# Patient Record
Sex: MALE | Race: WHITE | NOT HISPANIC OR LATINO | Employment: OTHER | URBAN - METROPOLITAN AREA
[De-identification: names, ages, dates, MRNs, and addresses within clinical notes are randomized per-mention and may not be internally consistent; named-entity substitution may affect disease eponyms.]

---

## 2017-01-11 ENCOUNTER — COMMUNICATION - HEALTHEAST (OUTPATIENT)
Dept: FAMILY MEDICINE | Facility: CLINIC | Age: 77
End: 2017-01-11

## 2017-01-11 ENCOUNTER — OFFICE VISIT - HEALTHEAST (OUTPATIENT)
Dept: FAMILY MEDICINE | Facility: CLINIC | Age: 77
End: 2017-01-11

## 2017-01-11 DIAGNOSIS — I10 BENIGN ESSENTIAL HYPERTENSION: ICD-10-CM

## 2017-01-11 DIAGNOSIS — N40.0 PROSTATISM: ICD-10-CM

## 2017-01-11 DIAGNOSIS — Z00.00 HEALTH CARE MAINTENANCE: ICD-10-CM

## 2017-01-11 DIAGNOSIS — N18.30 CKD (CHRONIC KIDNEY DISEASE) STAGE 3, GFR 30-59 ML/MIN (H): ICD-10-CM

## 2017-01-11 DIAGNOSIS — E03.9 HYPOTHYROIDISM, UNSPECIFIED TYPE: ICD-10-CM

## 2017-01-11 DIAGNOSIS — E78.5 HYPERLIPIDEMIA: ICD-10-CM

## 2017-01-11 DIAGNOSIS — Z00.00 MEDICARE ANNUAL WELLNESS VISIT, INITIAL: ICD-10-CM

## 2017-01-11 LAB
CHOLEST SERPL-MCNC: 190 MG/DL
FASTING STATUS PATIENT QL REPORTED: YES
HDLC SERPL-MCNC: 67 MG/DL
LDLC SERPL CALC-MCNC: 103 MG/DL
PSA SERPL-MCNC: 2.8 NG/ML (ref 0–6.5)
TRIGL SERPL-MCNC: 98 MG/DL

## 2017-01-11 ASSESSMENT — MIFFLIN-ST. JEOR: SCORE: 1485.57

## 2017-01-12 LAB
ATRIAL RATE - MUSE: 54 BPM
DIASTOLIC BLOOD PRESSURE - MUSE: NORMAL MMHG
INTERPRETATION ECG - MUSE: NORMAL
P AXIS - MUSE: 33 DEGREES
PR INTERVAL - MUSE: 160 MS
QRS DURATION - MUSE: 106 MS
QT - MUSE: 470 MS
QTC - MUSE: 445 MS
R AXIS - MUSE: -18 DEGREES
SYSTOLIC BLOOD PRESSURE - MUSE: NORMAL MMHG
T AXIS - MUSE: -2 DEGREES
VENTRICULAR RATE- MUSE: 54 BPM

## 2017-01-18 ENCOUNTER — COMMUNICATION - HEALTHEAST (OUTPATIENT)
Dept: FAMILY MEDICINE | Facility: CLINIC | Age: 77
End: 2017-01-18

## 2017-01-19 ENCOUNTER — COMMUNICATION - HEALTHEAST (OUTPATIENT)
Dept: FAMILY MEDICINE | Facility: CLINIC | Age: 77
End: 2017-01-19

## 2017-01-19 DIAGNOSIS — E87.6 HYPOKALEMIA: ICD-10-CM

## 2017-01-19 DIAGNOSIS — N40.0 BPH (BENIGN PROSTATIC HYPERPLASIA): ICD-10-CM

## 2017-01-19 DIAGNOSIS — I10 BENIGN ESSENTIAL HYPERTENSION: ICD-10-CM

## 2017-01-19 DIAGNOSIS — I10 ESSENTIAL HYPERTENSION: ICD-10-CM

## 2017-01-19 DIAGNOSIS — E03.9 HYPOTHYROIDISM: ICD-10-CM

## 2017-03-27 ENCOUNTER — AMBULATORY - HEALTHEAST (OUTPATIENT)
Dept: FAMILY MEDICINE | Facility: CLINIC | Age: 77
End: 2017-03-27

## 2017-03-27 ENCOUNTER — COMMUNICATION - HEALTHEAST (OUTPATIENT)
Dept: FAMILY MEDICINE | Facility: CLINIC | Age: 77
End: 2017-03-27

## 2017-03-27 DIAGNOSIS — I10 BENIGN ESSENTIAL HYPERTENSION: ICD-10-CM

## 2017-04-04 ENCOUNTER — COMMUNICATION - HEALTHEAST (OUTPATIENT)
Dept: FAMILY MEDICINE | Facility: CLINIC | Age: 77
End: 2017-04-04

## 2017-04-04 DIAGNOSIS — R25.2 LEG CRAMPS: ICD-10-CM

## 2017-05-03 ENCOUNTER — RECORDS - HEALTHEAST (OUTPATIENT)
Dept: ADMINISTRATIVE | Facility: OTHER | Age: 77
End: 2017-05-03

## 2017-05-30 ENCOUNTER — COMMUNICATION - HEALTHEAST (OUTPATIENT)
Dept: FAMILY MEDICINE | Facility: CLINIC | Age: 77
End: 2017-05-30

## 2017-05-30 DIAGNOSIS — I10 ESSENTIAL HYPERTENSION: ICD-10-CM

## 2017-07-31 ENCOUNTER — COMMUNICATION - HEALTHEAST (OUTPATIENT)
Dept: FAMILY MEDICINE | Facility: CLINIC | Age: 77
End: 2017-07-31

## 2017-09-07 ENCOUNTER — COMMUNICATION - HEALTHEAST (OUTPATIENT)
Dept: SCHEDULING | Facility: CLINIC | Age: 77
End: 2017-09-07

## 2017-09-07 DIAGNOSIS — R40.0 DAYTIME SLEEPINESS: ICD-10-CM

## 2017-09-07 DIAGNOSIS — R25.2 LEG CRAMPS: ICD-10-CM

## 2017-09-11 ENCOUNTER — COMMUNICATION - HEALTHEAST (OUTPATIENT)
Dept: FAMILY MEDICINE | Facility: CLINIC | Age: 77
End: 2017-09-11

## 2017-09-30 ENCOUNTER — COMMUNICATION - HEALTHEAST (OUTPATIENT)
Dept: FAMILY MEDICINE | Facility: CLINIC | Age: 77
End: 2017-09-30

## 2017-09-30 DIAGNOSIS — I10 BENIGN ESSENTIAL HYPERTENSION: ICD-10-CM

## 2017-11-11 ENCOUNTER — COMMUNICATION - HEALTHEAST (OUTPATIENT)
Dept: FAMILY MEDICINE | Facility: CLINIC | Age: 77
End: 2017-11-11

## 2017-11-11 DIAGNOSIS — I10 BENIGN ESSENTIAL HYPERTENSION: ICD-10-CM

## 2017-12-06 ENCOUNTER — AMBULATORY - HEALTHEAST (OUTPATIENT)
Dept: FAMILY MEDICINE | Facility: CLINIC | Age: 77
End: 2017-12-06

## 2018-01-10 ENCOUNTER — OFFICE VISIT - HEALTHEAST (OUTPATIENT)
Dept: FAMILY MEDICINE | Facility: CLINIC | Age: 78
End: 2018-01-10

## 2018-01-10 DIAGNOSIS — N40.0 BPH (BENIGN PROSTATIC HYPERPLASIA): ICD-10-CM

## 2018-01-10 DIAGNOSIS — N40.0 PROSTATISM: ICD-10-CM

## 2018-01-10 DIAGNOSIS — N52.9 ED (ERECTILE DYSFUNCTION): ICD-10-CM

## 2018-01-10 DIAGNOSIS — G47.19 EXCESSIVE DAYTIME SLEEPINESS: ICD-10-CM

## 2018-01-10 DIAGNOSIS — I63.50 CEREBRAL ARTERY OCCLUSION WITH CEREBRAL INFARCTION (H): ICD-10-CM

## 2018-01-10 DIAGNOSIS — I10 ESSENTIAL HYPERTENSION: ICD-10-CM

## 2018-01-10 DIAGNOSIS — I10 BENIGN ESSENTIAL HYPERTENSION: ICD-10-CM

## 2018-01-10 DIAGNOSIS — E03.9 HYPOTHYROIDISM, UNSPECIFIED TYPE: ICD-10-CM

## 2018-01-10 DIAGNOSIS — Z00.00 MEDICARE ANNUAL WELLNESS VISIT, INITIAL: ICD-10-CM

## 2018-01-10 LAB
ALBUMIN SERPL-MCNC: 3.2 G/DL (ref 3.5–5)
ALBUMIN UR-MCNC: ABNORMAL MG/DL
ALP SERPL-CCNC: 46 U/L (ref 45–120)
ALT SERPL W P-5'-P-CCNC: 22 U/L (ref 0–45)
ANION GAP SERPL CALCULATED.3IONS-SCNC: 8 MMOL/L (ref 5–18)
APPEARANCE UR: CLEAR
AST SERPL W P-5'-P-CCNC: 29 U/L (ref 0–40)
ATRIAL RATE - MUSE: 54 BPM
BACTERIA #/AREA URNS HPF: ABNORMAL HPF
BASOPHILS # BLD AUTO: 0 THOU/UL (ref 0–0.2)
BASOPHILS NFR BLD AUTO: 1 % (ref 0–2)
BILIRUB SERPL-MCNC: 0.8 MG/DL (ref 0–1)
BILIRUB UR QL STRIP: ABNORMAL
BUN SERPL-MCNC: 18 MG/DL (ref 8–28)
CALCIUM SERPL-MCNC: 9.9 MG/DL (ref 8.5–10.5)
CHLORIDE BLD-SCNC: 97 MMOL/L (ref 98–107)
CHOLEST SERPL-MCNC: 187 MG/DL
CO2 SERPL-SCNC: 31 MMOL/L (ref 22–31)
COLOR UR AUTO: YELLOW
CREAT SERPL-MCNC: 1.11 MG/DL (ref 0.7–1.3)
DIASTOLIC BLOOD PRESSURE - MUSE: NORMAL MMHG
EOSINOPHIL # BLD AUTO: 0.2 THOU/UL (ref 0–0.4)
EOSINOPHIL NFR BLD AUTO: 3 % (ref 0–6)
ERYTHROCYTE [DISTWIDTH] IN BLOOD BY AUTOMATED COUNT: 11.5 % (ref 11–14.5)
FASTING STATUS PATIENT QL REPORTED: YES
GFR SERPL CREATININE-BSD FRML MDRD: >60 ML/MIN/1.73M2
GLUCOSE BLD-MCNC: 83 MG/DL (ref 70–125)
GLUCOSE UR STRIP-MCNC: NEGATIVE MG/DL
HCT VFR BLD AUTO: 42.6 % (ref 40–54)
HDLC SERPL-MCNC: 56 MG/DL
HGB BLD-MCNC: 14.3 G/DL (ref 14–18)
HGB UR QL STRIP: NEGATIVE
HYALINE CASTS #/AREA URNS LPF: ABNORMAL LPF
INTERPRETATION ECG - MUSE: NORMAL
KETONES UR STRIP-MCNC: ABNORMAL MG/DL
LDLC SERPL CALC-MCNC: 115 MG/DL
LEUKOCYTE ESTERASE UR QL STRIP: NEGATIVE
LYMPHOCYTES # BLD AUTO: 0.6 THOU/UL (ref 0.8–4.4)
LYMPHOCYTES NFR BLD AUTO: 13 % (ref 20–40)
MCH RBC QN AUTO: 35.6 PG (ref 27–34)
MCHC RBC AUTO-ENTMCNC: 33.6 G/DL (ref 32–36)
MCV RBC AUTO: 106 FL (ref 80–100)
MONOCYTES # BLD AUTO: 0.6 THOU/UL (ref 0–0.9)
MONOCYTES NFR BLD AUTO: 11 % (ref 2–10)
MUCOUS THREADS #/AREA URNS LPF: ABNORMAL LPF
NEUTROPHILS # BLD AUTO: 3.6 THOU/UL (ref 2–7.7)
NEUTROPHILS NFR BLD AUTO: 73 % (ref 50–70)
NITRATE UR QL: NEGATIVE
P AXIS - MUSE: 42 DEGREES
PH UR STRIP: 6.5 [PH] (ref 5–8)
PLATELET # BLD AUTO: 210 THOU/UL (ref 140–440)
PMV BLD AUTO: 6.3 FL (ref 7–10)
POTASSIUM BLD-SCNC: 3.4 MMOL/L (ref 3.5–5)
PR INTERVAL - MUSE: 162 MS
PROT SERPL-MCNC: 6.2 G/DL (ref 6–8)
PSA SERPL-MCNC: 2.3 NG/ML (ref 0–6.5)
QRS DURATION - MUSE: 114 MS
QT - MUSE: 440 MS
QTC - MUSE: 417 MS
R AXIS - MUSE: -26 DEGREES
RBC # BLD AUTO: 4.02 MILL/UL (ref 4.4–6.2)
RBC #/AREA URNS AUTO: ABNORMAL HPF
SODIUM SERPL-SCNC: 136 MMOL/L (ref 136–145)
SP GR UR STRIP: >=1.03 (ref 1–1.03)
SQUAMOUS #/AREA URNS AUTO: ABNORMAL LPF
SYSTOLIC BLOOD PRESSURE - MUSE: NORMAL MMHG
T AXIS - MUSE: -12 DEGREES
TRIGL SERPL-MCNC: 81 MG/DL
TSH SERPL DL<=0.005 MIU/L-ACNC: 1.84 UIU/ML (ref 0.3–5)
UROBILINOGEN UR STRIP-ACNC: ABNORMAL
VENTRICULAR RATE- MUSE: 54 BPM
WBC #/AREA URNS AUTO: ABNORMAL HPF
WBC: 5 THOU/UL (ref 4–11)

## 2018-01-10 ASSESSMENT — MIFFLIN-ST. JEOR: SCORE: 1475.81

## 2018-01-11 ENCOUNTER — COMMUNICATION - HEALTHEAST (OUTPATIENT)
Dept: FAMILY MEDICINE | Facility: CLINIC | Age: 78
End: 2018-01-11

## 2018-01-11 LAB — BACTERIA SPEC CULT: NO GROWTH

## 2018-01-13 LAB
TESTOST SERPL-MCNC: 3290 NG/DL (ref 240–950)
TESTOSTERONE, FREE, S - HISTORICAL: 85.5 NG/DL (ref 3.08–11.3)

## 2018-01-19 ENCOUNTER — AMBULATORY - HEALTHEAST (OUTPATIENT)
Dept: FAMILY MEDICINE | Facility: CLINIC | Age: 78
End: 2018-01-19

## 2018-01-19 ENCOUNTER — COMMUNICATION - HEALTHEAST (OUTPATIENT)
Dept: FAMILY MEDICINE | Facility: CLINIC | Age: 78
End: 2018-01-19

## 2018-01-19 DIAGNOSIS — I10 BENIGN ESSENTIAL HYPERTENSION: ICD-10-CM

## 2018-02-28 ENCOUNTER — COMMUNICATION - HEALTHEAST (OUTPATIENT)
Dept: FAMILY MEDICINE | Facility: CLINIC | Age: 78
End: 2018-02-28

## 2018-02-28 DIAGNOSIS — R40.0 DAYTIME SLEEPINESS: ICD-10-CM

## 2018-03-01 ENCOUNTER — COMMUNICATION - HEALTHEAST (OUTPATIENT)
Dept: FAMILY MEDICINE | Facility: CLINIC | Age: 78
End: 2018-03-01

## 2018-03-06 ENCOUNTER — COMMUNICATION - HEALTHEAST (OUTPATIENT)
Dept: FAMILY MEDICINE | Facility: CLINIC | Age: 78
End: 2018-03-06

## 2018-04-24 ENCOUNTER — COMMUNICATION - HEALTHEAST (OUTPATIENT)
Dept: FAMILY MEDICINE | Facility: CLINIC | Age: 78
End: 2018-04-24

## 2018-04-27 ENCOUNTER — COMMUNICATION - HEALTHEAST (OUTPATIENT)
Dept: FAMILY MEDICINE | Facility: CLINIC | Age: 78
End: 2018-04-27

## 2018-06-25 ENCOUNTER — COMMUNICATION - HEALTHEAST (OUTPATIENT)
Dept: FAMILY MEDICINE | Facility: CLINIC | Age: 78
End: 2018-06-25

## 2018-07-18 ENCOUNTER — OFFICE VISIT - HEALTHEAST (OUTPATIENT)
Dept: FAMILY MEDICINE | Facility: CLINIC | Age: 78
End: 2018-07-18

## 2018-07-18 DIAGNOSIS — I10 BENIGN ESSENTIAL HYPERTENSION: ICD-10-CM

## 2018-07-18 DIAGNOSIS — I10 ESSENTIAL HYPERTENSION: ICD-10-CM

## 2018-07-18 DIAGNOSIS — G47.33 OSA (OBSTRUCTIVE SLEEP APNEA): ICD-10-CM

## 2018-07-18 ASSESSMENT — MIFFLIN-ST. JEOR: SCORE: 1505.75

## 2018-09-21 ENCOUNTER — AMBULATORY - HEALTHEAST (OUTPATIENT)
Dept: FAMILY MEDICINE | Facility: CLINIC | Age: 78
End: 2018-09-21

## 2018-12-04 ENCOUNTER — COMMUNICATION - HEALTHEAST (OUTPATIENT)
Dept: FAMILY MEDICINE | Facility: CLINIC | Age: 78
End: 2018-12-04

## 2018-12-04 DIAGNOSIS — N40.0 BPH (BENIGN PROSTATIC HYPERPLASIA): ICD-10-CM

## 2019-05-04 ENCOUNTER — COMMUNICATION - HEALTHEAST (OUTPATIENT)
Dept: FAMILY MEDICINE | Facility: CLINIC | Age: 79
End: 2019-05-04

## 2019-05-04 DIAGNOSIS — N40.0 BPH (BENIGN PROSTATIC HYPERPLASIA): ICD-10-CM

## 2019-05-08 ENCOUNTER — COMMUNICATION - HEALTHEAST (OUTPATIENT)
Dept: FAMILY MEDICINE | Facility: CLINIC | Age: 79
End: 2019-05-08

## 2019-05-08 DIAGNOSIS — R25.2 LEG CRAMPS: ICD-10-CM

## 2019-05-15 ENCOUNTER — COMMUNICATION - HEALTHEAST (OUTPATIENT)
Dept: FAMILY MEDICINE | Facility: CLINIC | Age: 79
End: 2019-05-15

## 2019-05-15 DIAGNOSIS — G47.33 OSA (OBSTRUCTIVE SLEEP APNEA): ICD-10-CM

## 2019-05-15 DIAGNOSIS — I10 ESSENTIAL HYPERTENSION: ICD-10-CM

## 2019-06-10 ENCOUNTER — COMMUNICATION - HEALTHEAST (OUTPATIENT)
Dept: FAMILY MEDICINE | Facility: CLINIC | Age: 79
End: 2019-06-10

## 2019-06-10 DIAGNOSIS — I10 ESSENTIAL HYPERTENSION: ICD-10-CM

## 2019-06-10 DIAGNOSIS — G47.33 OSA (OBSTRUCTIVE SLEEP APNEA): ICD-10-CM

## 2019-06-12 ENCOUNTER — OFFICE VISIT - HEALTHEAST (OUTPATIENT)
Dept: FAMILY MEDICINE | Facility: CLINIC | Age: 79
End: 2019-06-12

## 2019-06-12 DIAGNOSIS — E83.42 HYPOMAGNESEMIA: ICD-10-CM

## 2019-06-12 DIAGNOSIS — E87.1 HYPONATREMIA: ICD-10-CM

## 2019-06-12 DIAGNOSIS — I10 BENIGN ESSENTIAL HYPERTENSION: ICD-10-CM

## 2019-06-12 DIAGNOSIS — N18.4 CKD (CHRONIC KIDNEY DISEASE) STAGE 4, GFR 15-29 ML/MIN (H): ICD-10-CM

## 2019-06-12 DIAGNOSIS — E86.0 DEHYDRATION: ICD-10-CM

## 2019-06-12 DIAGNOSIS — R41.0 DISORIENTATION: ICD-10-CM

## 2019-06-12 LAB
ALBUMIN SERPL-MCNC: 3 G/DL (ref 3.5–5)
ALP SERPL-CCNC: 73 U/L (ref 45–120)
ALT SERPL W P-5'-P-CCNC: 42 U/L (ref 0–45)
ANION GAP SERPL CALCULATED.3IONS-SCNC: 9 MMOL/L (ref 5–18)
AST SERPL W P-5'-P-CCNC: 39 U/L (ref 0–40)
BILIRUB SERPL-MCNC: 0.6 MG/DL (ref 0–1)
BUN SERPL-MCNC: 11 MG/DL (ref 8–28)
CALCIUM SERPL-MCNC: 10.2 MG/DL (ref 8.5–10.5)
CHLORIDE BLD-SCNC: 108 MMOL/L (ref 98–107)
CO2 SERPL-SCNC: 21 MMOL/L (ref 22–31)
CREAT SERPL-MCNC: 1.15 MG/DL (ref 0.7–1.3)
ERYTHROCYTE [DISTWIDTH] IN BLOOD BY AUTOMATED COUNT: 13.2 % (ref 11–14.5)
GFR SERPL CREATININE-BSD FRML MDRD: >60 ML/MIN/1.73M2
GLUCOSE BLD-MCNC: 90 MG/DL (ref 70–125)
HCT VFR BLD AUTO: 34.8 % (ref 40–54)
HGB BLD-MCNC: 11.2 G/DL (ref 14–18)
MAGNESIUM SERPL-MCNC: 1.3 MG/DL (ref 1.8–2.6)
MCH RBC QN AUTO: 35.2 PG (ref 27–34)
MCHC RBC AUTO-ENTMCNC: 32.2 G/DL (ref 32–36)
MCV RBC AUTO: 109 FL (ref 80–100)
PLATELET # BLD AUTO: 351 THOU/UL (ref 140–440)
PMV BLD AUTO: 9.3 FL (ref 8.5–12.5)
POTASSIUM BLD-SCNC: 4.6 MMOL/L (ref 3.5–5)
PROT SERPL-MCNC: 6.3 G/DL (ref 6–8)
RBC # BLD AUTO: 3.18 MILL/UL (ref 4.4–6.2)
SODIUM SERPL-SCNC: 138 MMOL/L (ref 136–145)
WBC: 4.1 THOU/UL (ref 4–11)

## 2019-06-13 ENCOUNTER — OFFICE VISIT - HEALTHEAST (OUTPATIENT)
Dept: FAMILY MEDICINE | Facility: CLINIC | Age: 79
End: 2019-06-13

## 2019-06-13 ENCOUNTER — AMBULATORY - HEALTHEAST (OUTPATIENT)
Dept: FAMILY MEDICINE | Facility: CLINIC | Age: 79
End: 2019-06-13

## 2019-06-13 DIAGNOSIS — E83.42 HYPOMAGNESEMIA: ICD-10-CM

## 2019-06-13 DIAGNOSIS — F32.1 CURRENT MODERATE EPISODE OF MAJOR DEPRESSIVE DISORDER WITHOUT PRIOR EPISODE (H): ICD-10-CM

## 2019-06-13 DIAGNOSIS — E86.1 HYPOTENSION DUE TO HYPOVOLEMIA: ICD-10-CM

## 2019-06-13 DIAGNOSIS — R40.4 TRANSIENT ALTERATION OF AWARENESS: ICD-10-CM

## 2019-06-13 ASSESSMENT — MIFFLIN-ST. JEOR: SCORE: 1401.65

## 2019-06-27 ENCOUNTER — OFFICE VISIT - HEALTHEAST (OUTPATIENT)
Dept: FAMILY MEDICINE | Facility: CLINIC | Age: 79
End: 2019-06-27

## 2019-06-27 DIAGNOSIS — E83.42 HYPOMAGNESEMIA: ICD-10-CM

## 2019-06-27 DIAGNOSIS — F32.1 CURRENT MODERATE EPISODE OF MAJOR DEPRESSIVE DISORDER WITHOUT PRIOR EPISODE (H): ICD-10-CM

## 2019-07-11 ENCOUNTER — COMMUNICATION - HEALTHEAST (OUTPATIENT)
Dept: FAMILY MEDICINE | Facility: CLINIC | Age: 79
End: 2019-07-11

## 2019-07-11 DIAGNOSIS — N40.0 BPH (BENIGN PROSTATIC HYPERPLASIA): ICD-10-CM

## 2019-08-05 ENCOUNTER — OFFICE VISIT - HEALTHEAST (OUTPATIENT)
Dept: FAMILY MEDICINE | Facility: CLINIC | Age: 79
End: 2019-08-05

## 2019-08-05 DIAGNOSIS — E87.1 ACUTE HYPONATREMIA: ICD-10-CM

## 2019-08-05 DIAGNOSIS — E87.6 ACUTE HYPOKALEMIA: ICD-10-CM

## 2019-08-05 DIAGNOSIS — F32.1 CURRENT MODERATE EPISODE OF MAJOR DEPRESSIVE DISORDER WITHOUT PRIOR EPISODE (H): ICD-10-CM

## 2019-08-05 DIAGNOSIS — Z12.5 ENCOUNTER FOR SCREENING FOR MALIGNANT NEOPLASM OF PROSTATE: ICD-10-CM

## 2019-08-05 DIAGNOSIS — N17.9 AKI (ACUTE KIDNEY INJURY) (H): ICD-10-CM

## 2019-08-05 DIAGNOSIS — N40.0 PROSTATISM: ICD-10-CM

## 2019-08-05 LAB
ALBUMIN SERPL-MCNC: 4 G/DL (ref 3.5–5)
ALP SERPL-CCNC: 43 U/L (ref 45–120)
ALT SERPL W P-5'-P-CCNC: 14 U/L (ref 0–45)
ANION GAP SERPL CALCULATED.3IONS-SCNC: 6 MMOL/L (ref 5–18)
AST SERPL W P-5'-P-CCNC: 19 U/L (ref 0–40)
BILIRUB SERPL-MCNC: 0.6 MG/DL (ref 0–1)
BUN SERPL-MCNC: 20 MG/DL (ref 8–28)
CALCIUM SERPL-MCNC: 10.4 MG/DL (ref 8.5–10.5)
CHLORIDE BLD-SCNC: 105 MMOL/L (ref 98–107)
CO2 SERPL-SCNC: 27 MMOL/L (ref 22–31)
CREAT SERPL-MCNC: 1.12 MG/DL (ref 0.7–1.3)
ERYTHROCYTE [DISTWIDTH] IN BLOOD BY AUTOMATED COUNT: 11.9 % (ref 11–14.5)
GFR SERPL CREATININE-BSD FRML MDRD: >60 ML/MIN/1.73M2
GLUCOSE BLD-MCNC: 89 MG/DL (ref 70–125)
HCT VFR BLD AUTO: 39.5 % (ref 40–54)
HGB BLD-MCNC: 13.3 G/DL (ref 14–18)
MAGNESIUM SERPL-MCNC: 2 MG/DL (ref 1.8–2.6)
MCH RBC QN AUTO: 34.7 PG (ref 27–34)
MCHC RBC AUTO-ENTMCNC: 33.6 G/DL (ref 32–36)
MCV RBC AUTO: 103 FL (ref 80–100)
PLATELET # BLD AUTO: 248 THOU/UL (ref 140–440)
PMV BLD AUTO: 6.4 FL (ref 7–10)
POTASSIUM BLD-SCNC: 4.7 MMOL/L (ref 3.5–5)
PROT SERPL-MCNC: 6.8 G/DL (ref 6–8)
RBC # BLD AUTO: 3.82 MILL/UL (ref 4.4–6.2)
SODIUM SERPL-SCNC: 138 MMOL/L (ref 136–145)
WBC: 4.4 THOU/UL (ref 4–11)

## 2019-08-06 LAB — PSA SERPL-MCNC: 2.3 NG/ML (ref 0–6.5)

## 2019-08-07 ENCOUNTER — COMMUNICATION - HEALTHEAST (OUTPATIENT)
Dept: FAMILY MEDICINE | Facility: CLINIC | Age: 79
End: 2019-08-07

## 2019-09-30 ENCOUNTER — COMMUNICATION - HEALTHEAST (OUTPATIENT)
Dept: FAMILY MEDICINE | Facility: CLINIC | Age: 79
End: 2019-09-30

## 2019-09-30 DIAGNOSIS — N40.0 BPH (BENIGN PROSTATIC HYPERPLASIA): ICD-10-CM

## 2019-09-30 DIAGNOSIS — F32.1 CURRENT MODERATE EPISODE OF MAJOR DEPRESSIVE DISORDER WITHOUT PRIOR EPISODE (H): ICD-10-CM

## 2019-11-11 ENCOUNTER — COMMUNICATION - HEALTHEAST (OUTPATIENT)
Dept: FAMILY MEDICINE | Facility: CLINIC | Age: 79
End: 2019-11-11

## 2019-11-15 ENCOUNTER — COMMUNICATION - HEALTHEAST (OUTPATIENT)
Dept: FAMILY MEDICINE | Facility: CLINIC | Age: 79
End: 2019-11-15

## 2019-11-15 ENCOUNTER — RECORDS - HEALTHEAST (OUTPATIENT)
Dept: FAMILY MEDICINE | Facility: CLINIC | Age: 79
End: 2019-11-15

## 2019-11-18 ENCOUNTER — OFFICE VISIT - HEALTHEAST (OUTPATIENT)
Dept: FAMILY MEDICINE | Facility: CLINIC | Age: 79
End: 2019-11-18

## 2019-11-18 DIAGNOSIS — Z01.818 PREOP GENERAL PHYSICAL EXAM: ICD-10-CM

## 2019-11-18 DIAGNOSIS — Z23 ENCOUNTER FOR IMMUNIZATION: ICD-10-CM

## 2019-11-18 LAB
BASOPHILS # BLD AUTO: 0 THOU/UL (ref 0–0.2)
BASOPHILS NFR BLD AUTO: 0 % (ref 0–2)
EOSINOPHIL # BLD AUTO: 0.1 THOU/UL (ref 0–0.4)
EOSINOPHIL NFR BLD AUTO: 2 % (ref 0–6)
ERYTHROCYTE [DISTWIDTH] IN BLOOD BY AUTOMATED COUNT: 12.3 % (ref 11–14.5)
HCT VFR BLD AUTO: 42.4 % (ref 40–54)
HGB BLD-MCNC: 14.9 G/DL (ref 14–18)
LYMPHOCYTES # BLD AUTO: 1.3 THOU/UL (ref 0.8–4.4)
LYMPHOCYTES NFR BLD AUTO: 25 % (ref 20–40)
MCH RBC QN AUTO: 36.1 PG (ref 27–34)
MCHC RBC AUTO-ENTMCNC: 35.2 G/DL (ref 32–36)
MCV RBC AUTO: 102 FL (ref 80–100)
MONOCYTES # BLD AUTO: 0.6 THOU/UL (ref 0–0.9)
MONOCYTES NFR BLD AUTO: 11 % (ref 2–10)
NEUTROPHILS # BLD AUTO: 3.2 THOU/UL (ref 2–7.7)
NEUTROPHILS NFR BLD AUTO: 62 % (ref 50–70)
PLATELET # BLD AUTO: 224 THOU/UL (ref 140–440)
PMV BLD AUTO: 6.8 FL (ref 7–10)
POTASSIUM BLD-SCNC: 4 MMOL/L (ref 3.5–5)
RBC # BLD AUTO: 4.14 MILL/UL (ref 4.4–6.2)
WBC: 5.1 THOU/UL (ref 4–11)

## 2019-11-18 ASSESSMENT — MIFFLIN-ST. JEOR: SCORE: 1440.66

## 2019-12-09 ENCOUNTER — COMMUNICATION - HEALTHEAST (OUTPATIENT)
Dept: FAMILY MEDICINE | Facility: CLINIC | Age: 79
End: 2019-12-09

## 2019-12-09 DIAGNOSIS — N40.0 BPH (BENIGN PROSTATIC HYPERPLASIA): ICD-10-CM

## 2019-12-09 DIAGNOSIS — F32.1 CURRENT MODERATE EPISODE OF MAJOR DEPRESSIVE DISORDER WITHOUT PRIOR EPISODE (H): ICD-10-CM

## 2020-03-19 ENCOUNTER — RECORDS - HEALTHEAST (OUTPATIENT)
Dept: ADMINISTRATIVE | Facility: OTHER | Age: 80
End: 2020-03-19

## 2020-05-15 ENCOUNTER — AMBULATORY - HEALTHEAST (OUTPATIENT)
Dept: FAMILY MEDICINE | Facility: CLINIC | Age: 80
End: 2020-05-15

## 2020-05-15 ENCOUNTER — COMMUNICATION - HEALTHEAST (OUTPATIENT)
Dept: FAMILY MEDICINE | Facility: CLINIC | Age: 80
End: 2020-05-15

## 2020-05-15 DIAGNOSIS — R25.2 LEG CRAMPS: ICD-10-CM

## 2020-05-15 DIAGNOSIS — I10 ESSENTIAL HYPERTENSION: ICD-10-CM

## 2020-06-18 ENCOUNTER — AMBULATORY - HEALTHEAST (OUTPATIENT)
Dept: FAMILY MEDICINE | Facility: CLINIC | Age: 80
End: 2020-06-18

## 2020-06-18 ENCOUNTER — COMMUNICATION - HEALTHEAST (OUTPATIENT)
Dept: FAMILY MEDICINE | Facility: CLINIC | Age: 80
End: 2020-06-18

## 2020-06-18 DIAGNOSIS — R55 SYNCOPE, UNSPECIFIED SYNCOPE TYPE: ICD-10-CM

## 2020-06-18 DIAGNOSIS — E03.9 HYPOTHYROIDISM, UNSPECIFIED TYPE: ICD-10-CM

## 2020-06-22 ENCOUNTER — AMBULATORY - HEALTHEAST (OUTPATIENT)
Dept: LAB | Facility: CLINIC | Age: 80
End: 2020-06-22

## 2020-06-22 DIAGNOSIS — R55 SYNCOPE, UNSPECIFIED SYNCOPE TYPE: ICD-10-CM

## 2020-06-22 DIAGNOSIS — E03.9 HYPOTHYROIDISM, UNSPECIFIED TYPE: ICD-10-CM

## 2020-06-22 LAB — TSH SERPL DL<=0.005 MIU/L-ACNC: 1.29 UIU/ML (ref 0.3–5)

## 2020-06-29 ENCOUNTER — HOSPITAL ENCOUNTER (OUTPATIENT)
Dept: CARDIOLOGY | Facility: HOSPITAL | Age: 80
Discharge: HOME | End: 2020-06-29
Attending: FAMILY MEDICINE

## 2020-06-29 DIAGNOSIS — R55 SYNCOPE, UNSPECIFIED SYNCOPE TYPE: ICD-10-CM

## 2020-07-07 ENCOUNTER — RECORDS - HEALTHEAST (OUTPATIENT)
Dept: ADMINISTRATIVE | Facility: OTHER | Age: 80
End: 2020-07-07

## 2020-07-10 ENCOUNTER — OFFICE VISIT - HEALTHEAST (OUTPATIENT)
Dept: FAMILY MEDICINE | Facility: CLINIC | Age: 80
End: 2020-07-10

## 2020-07-10 DIAGNOSIS — K22.89 PRESBYESOPHAGUS: ICD-10-CM

## 2020-07-10 DIAGNOSIS — N18.30 CKD (CHRONIC KIDNEY DISEASE) STAGE 3, GFR 30-59 ML/MIN (H): ICD-10-CM

## 2020-07-10 ASSESSMENT — PATIENT HEALTH QUESTIONNAIRE - PHQ9: SUM OF ALL RESPONSES TO PHQ QUESTIONS 1-9: 7

## 2020-08-07 ENCOUNTER — RECORDS - HEALTHEAST (OUTPATIENT)
Dept: ADMINISTRATIVE | Facility: OTHER | Age: 80
End: 2020-08-07

## 2020-08-14 ENCOUNTER — RECORDS - HEALTHEAST (OUTPATIENT)
Dept: HEALTH INFORMATION MANAGEMENT | Facility: CLINIC | Age: 80
End: 2020-08-14

## 2020-09-17 ENCOUNTER — COMMUNICATION - HEALTHEAST (OUTPATIENT)
Dept: FAMILY MEDICINE | Facility: CLINIC | Age: 80
End: 2020-09-17

## 2020-09-17 DIAGNOSIS — N40.0 BPH (BENIGN PROSTATIC HYPERPLASIA): ICD-10-CM

## 2020-10-28 ENCOUNTER — RECORDS - HEALTHEAST (OUTPATIENT)
Dept: ADMINISTRATIVE | Facility: OTHER | Age: 80
End: 2020-10-28

## 2020-10-30 ENCOUNTER — AMBULATORY - HEALTHEAST (OUTPATIENT)
Dept: FAMILY MEDICINE | Facility: CLINIC | Age: 80
End: 2020-10-30

## 2020-10-30 DIAGNOSIS — R93.0 ABNORMAL CT OF PARANASAL SINUSES: ICD-10-CM

## 2020-11-02 ENCOUNTER — COMMUNICATION - HEALTHEAST (OUTPATIENT)
Dept: FAMILY MEDICINE | Facility: CLINIC | Age: 80
End: 2020-11-02

## 2020-11-10 ENCOUNTER — OFFICE VISIT - HEALTHEAST (OUTPATIENT)
Dept: OTOLARYNGOLOGY | Facility: CLINIC | Age: 80
End: 2020-11-10

## 2020-11-10 DIAGNOSIS — J32.4 CHRONIC PANSINUSITIS: ICD-10-CM

## 2020-11-10 DIAGNOSIS — J34.89 NASAL DRYNESS: ICD-10-CM

## 2020-12-24 ENCOUNTER — RECORDS - HEALTHEAST (OUTPATIENT)
Dept: ADMINISTRATIVE | Facility: OTHER | Age: 80
End: 2020-12-24

## 2020-12-30 ENCOUNTER — RECORDS - HEALTHEAST (OUTPATIENT)
Dept: ADMINISTRATIVE | Facility: OTHER | Age: 80
End: 2020-12-30

## 2021-01-26 ENCOUNTER — AMBULATORY - HEALTHEAST (OUTPATIENT)
Dept: FAMILY MEDICINE | Facility: CLINIC | Age: 81
End: 2021-01-26

## 2021-01-26 DIAGNOSIS — N40.0 BPH (BENIGN PROSTATIC HYPERPLASIA): ICD-10-CM

## 2021-02-03 ENCOUNTER — RECORDS - HEALTHEAST (OUTPATIENT)
Dept: ADMINISTRATIVE | Facility: OTHER | Age: 81
End: 2021-02-03

## 2021-02-10 ENCOUNTER — COMMUNICATION - HEALTHEAST (OUTPATIENT)
Dept: FAMILY MEDICINE | Facility: CLINIC | Age: 81
End: 2021-02-10

## 2021-02-18 ENCOUNTER — AMBULATORY - HEALTHEAST (OUTPATIENT)
Dept: SURGERY | Facility: AMBULATORY SURGERY CENTER | Age: 81
End: 2021-02-18

## 2021-02-18 DIAGNOSIS — Z11.59 ENCOUNTER FOR SCREENING FOR OTHER VIRAL DISEASES: ICD-10-CM

## 2021-03-08 ENCOUNTER — OFFICE VISIT - HEALTHEAST (OUTPATIENT)
Dept: FAMILY MEDICINE | Facility: CLINIC | Age: 81
End: 2021-03-08

## 2021-03-08 DIAGNOSIS — N39.43 BENIGN PROSTATIC HYPERPLASIA WITH POST-VOID DRIBBLING: ICD-10-CM

## 2021-03-08 DIAGNOSIS — Z01.818 PREOP GENERAL PHYSICAL EXAM: ICD-10-CM

## 2021-03-08 DIAGNOSIS — Z12.5 ENCOUNTER FOR SCREENING FOR MALIGNANT NEOPLASM OF PROSTATE: ICD-10-CM

## 2021-03-08 DIAGNOSIS — F11.29 OPIOID DEPENDENCE WITH OPIOID-INDUCED DISORDER (H): ICD-10-CM

## 2021-03-08 DIAGNOSIS — N40.1 BENIGN PROSTATIC HYPERPLASIA WITH POST-VOID DRIBBLING: ICD-10-CM

## 2021-03-08 LAB
ALBUMIN SERPL-MCNC: 4.3 G/DL (ref 3.5–5)
ALBUMIN UR-MCNC: ABNORMAL MG/DL
ALP SERPL-CCNC: 59 U/L (ref 45–120)
ALT SERPL W P-5'-P-CCNC: 13 U/L (ref 0–45)
ANION GAP SERPL CALCULATED.3IONS-SCNC: 12 MMOL/L (ref 5–18)
APPEARANCE UR: CLEAR
AST SERPL W P-5'-P-CCNC: 16 U/L (ref 0–40)
ATRIAL RATE - MUSE: 70 BPM
BACTERIA #/AREA URNS HPF: ABNORMAL HPF
BILIRUB SERPL-MCNC: 0.8 MG/DL (ref 0–1)
BILIRUB UR QL STRIP: NEGATIVE
BUN SERPL-MCNC: 24 MG/DL (ref 8–28)
CALCIUM SERPL-MCNC: 9.4 MG/DL (ref 8.5–10.5)
CHLORIDE BLD-SCNC: 101 MMOL/L (ref 98–107)
CO2 SERPL-SCNC: 24 MMOL/L (ref 22–31)
COLOR UR AUTO: YELLOW
CREAT SERPL-MCNC: 1.5 MG/DL (ref 0.7–1.3)
DIASTOLIC BLOOD PRESSURE - MUSE: NORMAL
ERYTHROCYTE [DISTWIDTH] IN BLOOD BY AUTOMATED COUNT: 13.3 % (ref 11–14.5)
GFR SERPL CREATININE-BSD FRML MDRD: 45 ML/MIN/1.73M2
GLUCOSE BLD-MCNC: 104 MG/DL (ref 70–125)
GLUCOSE UR STRIP-MCNC: NEGATIVE MG/DL
HCT VFR BLD AUTO: 44.6 % (ref 40–54)
HGB BLD-MCNC: 14.8 G/DL (ref 14–18)
HGB UR QL STRIP: ABNORMAL
HYALINE CASTS #/AREA URNS LPF: ABNORMAL LPF
INTERPRETATION ECG - MUSE: NORMAL
KETONES UR STRIP-MCNC: NEGATIVE MG/DL
LEUKOCYTE ESTERASE UR QL STRIP: ABNORMAL
MCH RBC QN AUTO: 33.7 PG (ref 27–34)
MCHC RBC AUTO-ENTMCNC: 33.2 G/DL (ref 32–36)
MCV RBC AUTO: 102 FL (ref 80–100)
MUCOUS THREADS #/AREA URNS LPF: ABNORMAL LPF
NITRATE UR QL: NEGATIVE
P AXIS - MUSE: 34 DEGREES
PH UR STRIP: 6 [PH] (ref 5–8)
PLATELET # BLD AUTO: 214 THOU/UL (ref 140–440)
PMV BLD AUTO: 8.5 FL (ref 7–10)
POTASSIUM BLD-SCNC: 4.5 MMOL/L (ref 3.5–5)
PR INTERVAL - MUSE: 156 MS
PROT SERPL-MCNC: 7.2 G/DL (ref 6–8)
PSA SERPL-MCNC: 8.5 NG/ML (ref 0–6.5)
QRS DURATION - MUSE: 104 MS
QT - MUSE: 406 MS
QTC - MUSE: 438 MS
R AXIS - MUSE: -24 DEGREES
RBC # BLD AUTO: 4.39 MILL/UL (ref 4.4–6.2)
RBC #/AREA URNS AUTO: ABNORMAL HPF
SODIUM SERPL-SCNC: 137 MMOL/L (ref 136–145)
SP GR UR STRIP: 1.02 (ref 1–1.03)
SQUAMOUS #/AREA URNS AUTO: ABNORMAL LPF
SYSTOLIC BLOOD PRESSURE - MUSE: NORMAL
T AXIS - MUSE: 18 DEGREES
TRANS CELLS #/AREA URNS HPF: ABNORMAL LPF
UROBILINOGEN UR STRIP-ACNC: ABNORMAL
VENTRICULAR RATE- MUSE: 70 BPM
WBC #/AREA URNS AUTO: ABNORMAL HPF
WBC: 6.2 THOU/UL (ref 4–11)

## 2021-03-09 LAB — BACTERIA SPEC CULT: NO GROWTH

## 2021-03-11 ASSESSMENT — MIFFLIN-ST. JEOR
SCORE: 1453.82
SCORE: 1453.82

## 2021-03-12 ENCOUNTER — AMBULATORY - HEALTHEAST (OUTPATIENT)
Dept: LAB | Facility: CLINIC | Age: 81
End: 2021-03-12

## 2021-03-12 DIAGNOSIS — Z11.59 ENCOUNTER FOR SCREENING FOR OTHER VIRAL DISEASES: ICD-10-CM

## 2021-03-12 LAB
SARS-COV-2 PCR COMMENT: NORMAL
SARS-COV-2 RNA SPEC QL NAA+PROBE: NEGATIVE
SARS-COV-2 VIRUS SPECIMEN SOURCE: NORMAL

## 2021-03-13 ENCOUNTER — COMMUNICATION - HEALTHEAST (OUTPATIENT)
Dept: SCHEDULING | Facility: CLINIC | Age: 81
End: 2021-03-13

## 2021-03-15 ENCOUNTER — ANESTHESIA - HEALTHEAST (OUTPATIENT)
Dept: SURGERY | Facility: AMBULATORY SURGERY CENTER | Age: 81
End: 2021-03-15

## 2021-03-15 ENCOUNTER — AMBULATORY - HEALTHEAST (OUTPATIENT)
Dept: FAMILY MEDICINE | Facility: CLINIC | Age: 81
End: 2021-03-15

## 2021-03-15 DIAGNOSIS — R25.2 LEG CRAMPS: ICD-10-CM

## 2021-03-16 ENCOUNTER — SURGERY - HEALTHEAST (OUTPATIENT)
Dept: SURGERY | Facility: AMBULATORY SURGERY CENTER | Age: 81
End: 2021-03-16

## 2021-03-16 ENCOUNTER — HOSPITAL ENCOUNTER (OUTPATIENT)
Dept: SURGERY | Facility: AMBULATORY SURGERY CENTER | Age: 81
Discharge: HOME | End: 2021-03-16
Attending: UROLOGY | Admitting: UROLOGY
Payer: COMMERCIAL

## 2021-03-16 DIAGNOSIS — R97.20 ELEVATED PROSTATE SPECIFIC ANTIGEN (PSA): ICD-10-CM

## 2021-03-16 ASSESSMENT — MIFFLIN-ST. JEOR
SCORE: 1453.82
SCORE: 1453.82

## 2021-03-18 ENCOUNTER — COMMUNICATION - HEALTHEAST (OUTPATIENT)
Dept: FAMILY MEDICINE | Facility: CLINIC | Age: 81
End: 2021-03-18

## 2021-03-24 ENCOUNTER — AMBULATORY - HEALTHEAST (OUTPATIENT)
Dept: SURGERY | Facility: AMBULATORY SURGERY CENTER | Age: 81
End: 2021-03-24

## 2021-03-24 DIAGNOSIS — Z11.59 ENCOUNTER FOR SCREENING FOR OTHER VIRAL DISEASES: ICD-10-CM

## 2021-04-19 ENCOUNTER — OFFICE VISIT - HEALTHEAST (OUTPATIENT)
Dept: FAMILY MEDICINE | Facility: CLINIC | Age: 81
End: 2021-04-19

## 2021-04-19 DIAGNOSIS — Z01.818 PREOP GENERAL PHYSICAL EXAM: ICD-10-CM

## 2021-04-19 LAB
BASOPHILS # BLD AUTO: 0 THOU/UL (ref 0–0.2)
BASOPHILS NFR BLD AUTO: 0 % (ref 0–2)
EOSINOPHIL # BLD AUTO: 0 THOU/UL (ref 0–0.4)
EOSINOPHIL NFR BLD AUTO: 1 % (ref 0–6)
ERYTHROCYTE [DISTWIDTH] IN BLOOD BY AUTOMATED COUNT: 12.3 % (ref 11–14.5)
HCT VFR BLD AUTO: 41.4 % (ref 40–54)
HGB BLD-MCNC: 14.6 G/DL (ref 14–18)
IMM GRANULOCYTES # BLD: 0 THOU/UL
IMM GRANULOCYTES NFR BLD: 0 %
LYMPHOCYTES # BLD AUTO: 0.8 THOU/UL (ref 0.8–4.4)
LYMPHOCYTES NFR BLD AUTO: 15 % (ref 20–40)
MCH RBC QN AUTO: 35.1 PG (ref 27–34)
MCHC RBC AUTO-ENTMCNC: 35.3 G/DL (ref 32–36)
MCV RBC AUTO: 100 FL (ref 80–100)
MONOCYTES # BLD AUTO: 0.5 THOU/UL (ref 0–0.9)
MONOCYTES NFR BLD AUTO: 9 % (ref 2–10)
NEUTROPHILS # BLD AUTO: 3.9 THOU/UL (ref 2–7.7)
NEUTROPHILS NFR BLD AUTO: 75 % (ref 50–70)
PLATELET # BLD AUTO: 179 THOU/UL (ref 140–440)
PMV BLD AUTO: 8.5 FL (ref 7–10)
POTASSIUM BLD-SCNC: 4.3 MMOL/L (ref 3.5–5)
RBC # BLD AUTO: 4.16 MILL/UL (ref 4.4–6.2)
WBC: 5.2 THOU/UL (ref 4–11)

## 2021-04-23 ENCOUNTER — AMBULATORY - HEALTHEAST (OUTPATIENT)
Dept: LAB | Facility: CLINIC | Age: 81
End: 2021-04-23

## 2021-04-23 ENCOUNTER — ANESTHESIA - HEALTHEAST (OUTPATIENT)
Dept: SURGERY | Facility: AMBULATORY SURGERY CENTER | Age: 81
End: 2021-04-23

## 2021-04-23 DIAGNOSIS — Z11.59 ENCOUNTER FOR SCREENING FOR OTHER VIRAL DISEASES: ICD-10-CM

## 2021-04-23 ASSESSMENT — MIFFLIN-ST. JEOR: SCORE: 1440.21

## 2021-04-25 ENCOUNTER — COMMUNICATION - HEALTHEAST (OUTPATIENT)
Dept: SCHEDULING | Facility: CLINIC | Age: 81
End: 2021-04-25

## 2021-04-26 ENCOUNTER — SURGERY - HEALTHEAST (OUTPATIENT)
Dept: SURGERY | Facility: AMBULATORY SURGERY CENTER | Age: 81
End: 2021-04-26

## 2021-04-26 ASSESSMENT — MIFFLIN-ST. JEOR: SCORE: 1440.21

## 2021-05-26 ENCOUNTER — RECORDS - HEALTHEAST (OUTPATIENT)
Dept: ADMINISTRATIVE | Facility: OTHER | Age: 81
End: 2021-05-26

## 2021-05-27 ASSESSMENT — PATIENT HEALTH QUESTIONNAIRE - PHQ9: SUM OF ALL RESPONSES TO PHQ QUESTIONS 1-9: 7

## 2021-05-28 NOTE — TELEPHONE ENCOUNTER
Controlled Substance Refill Request  Medication:   Requested Prescriptions     Pending Prescriptions Disp Refills     modafinil (PROVIGIL) 200 MG tablet [Pharmacy Med Name: Modafinil Oral Tablet 200 MG] 90 tablet 2     Sig: TAKE ONE TABLET BY MOUTH ONE TIME DAILY     Date Last Fill: 7/18/18  #90 R-3  Pharmacy: [place] [id number]  Submit electronically to pharmacy  Controlled Substance Agreement on File:   Encounter-Level CSA Scan Date:    There are no encounter-level csa scan date.       Last office visit: 7/18/2018 Zac Rushing MD Katie Beck RN Triage Nurse Advisor [facility]

## 2021-05-28 NOTE — TELEPHONE ENCOUNTER
Refill Approved    Rx renewed per Medication Renewal Policy. Medication was last renewed on 12/5/18.    Ov: 7/18/18    Yari Jiang, [facility] Triage/Med Refill 5/5/2019     Requested Prescriptions   Pending Prescriptions Disp Refills     tamsulosin (FLOMAX) 0.4 mg cap [Pharmacy Med Name: Tamsulosin HCl Oral Capsule 0.4 MG] 90 capsule 0     Sig: TAKE ONE CAPSULE BY MOUTH ONE TIME DAILY       Alfuzosin/Tamsulosin/Silodosin Refill Protocol  Passed - 5/4/2019 12:25 PM        Passed - PCP or prescribing provider visit in past 12 months       Last office visit with prescriber/PCP: 7/18/2018 Zac Rushing MD OR same dept: 7/18/2018 Zac Rushing MD OR same specialty: 7/18/2018 Zac Rushing MD  Last physical: 1/10/2018 Last MTM visit: Visit date not found   Next visit within 3 mo: Visit date not found  Next physical within 3 mo: Visit date not found  Prescriber OR PCP: Zac Rushing MD  Last diagnosis associated with med order: 1. BPH (benign prostatic hyperplasia)  - tamsulosin (FLOMAX) 0.4 mg cap [Pharmacy Med Name: Tamsulosin HCl Oral Capsule 0.4 MG]; TAKE ONE CAPSULE BY MOUTH ONE TIME DAILY   Dispense: 90 capsule; Refill: 0    If protocol passes may refill for 12 months if within 3 months of last provider visit (or a total of 15 months).

## 2021-05-28 NOTE — TELEPHONE ENCOUNTER
RN cannot approve Refill Request    RN can NOT refill this medication med is not covered by policy/route to provider     . Last office visit: 7/18/2018 Zac Rushing MD Last Physical: 1/10/2018 Last MTM visit: Visit date not found Last visit same specialty: 7/18/2018 Zac Rushing MD.  Next visit within 3 mo: Visit date not found  Next physical within 3 mo: Visit date not found      Serena Hinojosa, [facility] Triage/Med Refill 5/8/2019    Requested Prescriptions   Pending Prescriptions Disp Refills     quiNINE sulfate (QUALAQUIN) 324 mg cap 90 capsule 0     Sig: Take 1 capsule (324 mg total) by mouth every 8 (eight) hours as needed.       There is no refill protocol information for this order

## 2021-05-29 NOTE — TELEPHONE ENCOUNTER
Controlled Substance Refill Request  Medication:   Requested Prescriptions     Pending Prescriptions Disp Refills     modafinil (PROVIGIL) 200 MG tablet [Pharmacy Med Name: Modafinil Oral Tablet 200 MG] 90 tablet 2     Sig: TAKE ONE TABLET BY MOUTH ONE TIME DAILY     Date Last Fill: 7/18/18  Pharmacy: [facility]   Submit electronically to pharmacy  Controlled Substance Agreement on File:   Encounter-Level CSA Scan Date:    There are no encounter-level csa scan date.       Last office visit: Last office visit pertaining to requested medication was 7/18/18.

## 2021-05-29 NOTE — PROGRESS NOTES
Assessment:     1. Current moderate episode of major depressive disorder without prior episode (H)  citalopram (CELEXA) 20 MG tablet    DISCONTINUED: citalopram (CELEXA) 20 MG tablet   2. Hypomagnesemia  magnesium oxide (MAGOX) 400 mg (241.3 mg magnesium) tablet    DISCONTINUED: citalopram (CELEXA) 20 MG tablet   3. Transient alteration of awareness     4. Hypotension due to hypovolemia         Plan:     1. Hypomagnesemia  We need to readjust the patient's magnesium he will begin the following tablets and take them once daily  - magnesium oxide (MAGOX) 400 mg (241.3 mg magnesium) tablet; Take 1 tablet (400 mg total) by mouth daily.  Dispense: 200 tablet; Refill: 1    2. Transient alteration of awareness  His mental clouding appears to be improving as his electrolytes are readjusted    3. Current moderate episode of major depressive disorder without prior episode (H)  He is going to cut back on his use of Vicodin through the chronic pain management doctor and I have suggested that based on his depression and pain that we will start the following medication  - citalopram (CELEXA) 20 MG tablet; Take 1 tablet (20 mg total) by mouth daily.  Dispense: 30 tablet; Refill: 2    4. Hypotension due to hypovolemia  We have discontinued his blood pressure medications and he is normotensive; goal is to cut down on renal and prerenal load      Subjective:   I am seeing the patient here for follow-up after hospitalization 10 days ago for weakness altered mental state hyponatremia hypokalemia hypomagnesemia and malnutrition.  Patient was hospitalized for 2 overnights was given intravenous fluids with correction of potassium.  He was found to be mildly anemic.  We did draw blood yesterday and his magnesium remains low hemoglobin is low at 11.2 his BUN/creatinine came back to normal GFR came back to normal.  Patient's strength is low.  He is driving again.  We have outlined an exercise program for him at home but he is to stay at home  and to begin eating again.  He is going to have his roommate move in with him and help him with his meals he needs to buy some boost and take it 4 times daily whatever flavor he wants.  Hydration is important he may go out in the yard walk around the neighborhood.  We did advise him to do a living will.  We have discontinued most of his blood pressure medications as he is hypotensive.  This will cut down on the prerenal load also.  He will cut down on his vitamin intake and overall pill load.  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  Overall this has taken a major toll on this patient's health and well-being.  I am worried about falls risk.  He dismissed home health care.  He dismissed OT and PT at home.  I was able to convince him to go on antidepressant medication in hopes of cutting down on the patient's use of Vicodin for low back discomfort.  If we do get him back nutritionally and back to baseline we may consider evaluating his low back once again via surgical approach.  He may continue to take his Flomax as prescribed because of the urinary tract dysfunction.  Overall his muscle tone is quite poor and I think it is affected his kidney function also.  This was a 50-minute visit going over all of these parameters and I will see him for follow-up closely on a 2-week basis he promises to have his partner come in with him next time.    Review of Systems: A complete 14 point review of systems was obtained and is negative or as stated in the history of present illness.    No past medical history on file.  No family history on file.  No past surgical history on file.  Social History     Tobacco Use     Smoking status: Never Smoker     Smokeless tobacco: Never Used   Substance Use Topics     Alcohol use: Yes      "Drug use: Not on file         Objective:   /66   Pulse 72   Ht 5' 8\" (1.727 m)   Wt 157 lb (71.2 kg)   SpO2 98%   BMI 23.87 kg/m      General Appearance:  Alert, cooperative, no distress  Head:  Normocephalic, no obvious abnormality  Ears: TM anatomy normal  Eyes:  PERRL, EOM's intact, conjunctiva and corneas clear  Nose:  Nares symmetrical, septum midline, mucosa pink, no sinus tenderness  Throat:  Lips, tongue, and mucosa are moist, pink, and intact  Neck:  Supple, symmetrical, trachea midline, no adenopathy; thyroid: no enlargement, symmetric,no tenderness/mass/nodules; no carotid bruit, no JVD  Back:  Symmetrical, no curvature, ROM normal, no CVA tenderness  Chest/Breast:  No mass or tenderness  Lungs:  Clear to auscultation bilaterally, respirations unlabored   Heart:  Normal PMI, regular rate & rhythm, S1 and S2 normal, no murmurs, rubs, or gallops  Abdomen:  Soft, non-tender, bowel sounds active all four quadrants, no mass, or organomegaly  Musculoskeletal:  Tone and strength strong and symmetrical, all extremities  Lymphatic:  No adenopathy  Skin/Hair/Nails:  Skin warm, dry, patient is moderately dehydrated  Neurologic:  Alert and oriented x3, no cranial nerve deficits, normal strength and tone, gait steady  Extremities:  No edema.  Rosa's sign negative.    Genitourinary: deferred  Pulses:  Equal bilaterally     I have had an Advance Directives discussion with the patient.      This note has been dictated using voice recognition software. Any grammatical or context distortions are unintentional and inherent to the the software.   "

## 2021-05-30 VITALS — HEIGHT: 69 IN | WEIGHT: 172 LBS | BODY MASS INDEX: 25.48 KG/M2

## 2021-05-30 NOTE — PROGRESS NOTES
Assessment:     1. Hypomagnesemia     2. Current moderate episode of major depressive disorder without prior episode (H)  citalopram (CELEXA) 20 MG tablet       Plan:     1. Current moderate episode of major depressive disorder without prior episode (H)  Patient is noticing a marked improvement in his mood and affect and anger management and also stimulation of his acts of daily living since beginning the following medication will continue on this medication indefinitely reexamine patient 1 month  - citalopram (CELEXA) 20 MG tablet; Take 1 tablet (20 mg total) by mouth daily.  Dispense: 90 tablet; Refill: 3    2. Hypomagnesemia  Patient is taking his magnesium supplements and is also doing boost 4 times daily weight has stabilized.  He needs to add more fiber to his diet we suggest raisin Bran/banana breakfast in the morning; difficult for him to cook as he is living alone but he is planning his meals      Subjective:   Seeing the patient for follow-up after 2 weeks following a hospitalization for weakness and failure to thrive.  Patient is doing very well we have started him on citalopram his mood affect and anger management are all improved.  He is eating and once again.  He is cooking for himself.  He continues to live alone and not with a consort.  He is managing his affairs much better.  He is walking around the block once or twice per day gets a little winded after that strength is slowly coming back from baseline.  He is deconditioned.  We will increase his aerobic fitness to to walks around the block twice daily he may go back to the golf range and hit a few balls but no active golf.  He is driving and shopping for himself.  He is having difficulty eating breakfast we suggested the raisin Bran banana Yon type of breakfast which he can manage.  He can shop at [employer] and obtain his needs there.  He is doing post type supplements 4 times daily.  I am very happy with his progress at this point  he remains alcohol free.  We did discuss possibility of him attending AA meetings if he does however will get the urge again to drink.  I will see him for follow-up 1 month patient is on minimal medications and we plan on keeping it that way.    Review of Systems: A complete 14 point review of systems was obtained and is negative or as stated in the history of present illness.    No past medical history on file.  No family history on file.  No past surgical history on file.  Social History     Tobacco Use     Smoking status: Never Smoker     Smokeless tobacco: Never Used   Substance Use Topics     Alcohol use: Yes     Drug use: Not on file         Objective:   /66   Pulse 76   Wt 154 lb (69.9 kg)   SpO2 96%   BMI 23.42 kg/m      General Appearance:  Alert, cooperative, no distress  Head:  Normocephalic, no obvious abnormality  Ears: TM anatomy normal  Eyes:  PERRL, EOM's intact, conjunctiva and corneas clear  Nose:  Nares symmetrical, septum midline, mucosa pink, no sinus tenderness  Throat:  Lips, tongue, and mucosa are moist, pink, and intact  Neck:  Supple, symmetrical, trachea midline, no adenopathy; thyroid: no enlargement, symmetric,no tenderness/mass/nodules; no carotid bruit, no JVD  Back:  Symmetrical, no curvature, ROM normal, no CVA tenderness  Chest/Breast:  No mass or tenderness  Lungs:  Clear to auscultation bilaterally, respirations unlabored   Heart:  Normal PMI, regular rate & rhythm, S1 and S2 normal, no murmurs, rubs, or gallops  Abdomen:  Soft, non-tender, bowel sounds active all four quadrants, no mass, or organomegaly  Musculoskeletal:  Tone and strength strong and symmetrical, all extremities  Lymphatic:  No adenopathy  Skin/Hair/Nails:  Skin warm, dry, and intact, no rashes skin turgor markedly improved from 2 weeks ago  Neurologic:  Alert and oriented x3, no cranial nerve deficits, normal strength and tone, gait steady  Extremities:  No edema.  Rosa's sign negative.     Genitourinary: deferred  Pulses:  Equal bilaterally           This note has been dictated using voice recognition software. Any grammatical or context distortions are unintentional and inherent to the the software.

## 2021-05-30 NOTE — TELEPHONE ENCOUNTER
Refill Approved    Rx renewed per Medication Renewal Policy. Medication was last renewed on 5/5/19.    Yaima Reis, [facility] Triage/Med Refill 7/11/2019     Requested Prescriptions   Pending Prescriptions Disp Refills     tamsulosin (FLOMAX) 0.4 mg cap [Pharmacy Med Name: Tamsulosin HCl Oral Capsule 0.4 MG] 90 capsule 0     Sig: TAKE ONE CAPSULE BY MOUTH ONE TIME DAILY       Alfuzosin/Tamsulosin/Silodosin Refill Protocol  Passed - 7/11/2019 12:38 AM        Passed - PCP or prescribing provider visit in past 12 months       Last office visit with prescriber/PCP: 6/27/2019 Zac Rushing MD OR same dept: 6/27/2019 Zac Rushing MD OR same specialty: 6/27/2019 Zac Rushing MD  Last physical: 1/10/2018 Last MTM visit: Visit date not found   Next visit within 3 mo: Visit date not found  Next physical within 3 mo: Visit date not found  Prescriber OR PCP: Zac Rushing MD  Last diagnosis associated with med order: 1. BPH (benign prostatic hyperplasia)  - tamsulosin (FLOMAX) 0.4 mg cap [Pharmacy Med Name: Tamsulosin HCl Oral Capsule 0.4 MG]; TAKE ONE CAPSULE BY MOUTH ONE TIME DAILY   Dispense: 90 capsule; Refill: 0    If protocol passes may refill for 12 months if within 3 months of last provider visit (or a total of 15 months).

## 2021-05-31 VITALS — BODY MASS INDEX: 25.42 KG/M2 | HEIGHT: 69 IN | WEIGHT: 171.6 LBS

## 2021-06-01 ENCOUNTER — RECORDS - HEALTHEAST (OUTPATIENT)
Dept: ADMINISTRATIVE | Facility: CLINIC | Age: 81
End: 2021-06-01

## 2021-06-01 VITALS — WEIGHT: 178.2 LBS | BODY MASS INDEX: 26.39 KG/M2 | HEIGHT: 69 IN

## 2021-06-03 VITALS — WEIGHT: 157 LBS | HEIGHT: 68 IN | BODY MASS INDEX: 23.79 KG/M2

## 2021-06-03 VITALS — WEIGHT: 154 LBS | BODY MASS INDEX: 23.42 KG/M2

## 2021-06-03 VITALS
WEIGHT: 165.6 LBS | SYSTOLIC BLOOD PRESSURE: 144 MMHG | BODY MASS INDEX: 25.1 KG/M2 | HEIGHT: 68 IN | DIASTOLIC BLOOD PRESSURE: 82 MMHG | HEART RATE: 100 BPM

## 2021-06-03 VITALS — WEIGHT: 165.44 LBS | BODY MASS INDEX: 25.15 KG/M2

## 2021-06-03 NOTE — TELEPHONE ENCOUNTER
Called patient who states he was requesting to speak with Dr. Rushing directly.  Please call patient if appropriate

## 2021-06-03 NOTE — TELEPHONE ENCOUNTER
[facility]  Reason for Call:  Patient has a pre-op on 11/18/19. Please fax completed pre-op report to [phone].  Date of last appointment with primary care: Coming up on 11/18/19  Okay to leave a detailed message: Yes

## 2021-06-03 NOTE — TELEPHONE ENCOUNTER
Who is calling:  Patient     Reason for Call: Patient has general questions regarding up-coming Pre-Op apt on 11/14/19. Patient did not want to elaborate on his questions or   Concerns. Please call patient back asap   Thank You     Date of last appointment with primary care: 08/05/19    Okay to leave a detailed message: Yes

## 2021-06-03 NOTE — PROGRESS NOTES
Preoperative Exam    Scheduled Procedure: Lumbar Neuroectomy  Surgery Date:  11/20/2019  Surgery Location at pain surgeons location  Surgeon:  Dr. Vizcarra    Assessment/Plan:     1. Preop general physical exam  Work-up to include the following  - HM1(CBC and Differential)  - Potassium  - HM1 (CBC with Diff)    2. Encounter for immunization  We will defer on his flu shot based on the emergence of surgery in the next 36 hours    Surgical Procedure Risk: Low (reported cardiac risk generally < 1%)  Have you had prior anesthesia?: Yes  Have you or any family members had a previous anesthesia reaction:  No  Do you or any family members have a history of a clotting or bleeding disorder?: No  Cardiac Risk Assessment: no increased risk for major cardiac complications    APPROVAL GIVEN to proceed with proposed procedure, without further diagnostic evaluation        Functional Status: Independent  Patient plans to recover at home with family.     Subjective:      Mayur LE TAYE Patel is a 78 y.o. male who presents for a preoperative consultation.  Patient has had intractable lower lumbar nerve pain for many years and is under the care of pain management; he has had epidural injections and according to my records he is on pain control which is managed by pain specialist.  Most recent examination suggested patient because of ongoing nerve pain and balance problems more localized on the left side than on the right the patient undergo a localized T12-L5 blunt dissection neurectomy first doing the right side then doing the left side under general anesthesia.  Patient had a stormy year and that he did develop acute renal failure secondary to dehydration was hospitalized for 5 days was hypomagnesemic hyponatremic and dehydrated.  He eventually recovered and was seen and evaluated and returned to baseline status where he has been for the last 3 or 4 months.  He recently has had an exacerbation of his chronic pain in both lower lumbar  rootlet areas but it has been exacerbated lately here at summer and early fall.  Under direction of pain control this blunt nerve dissection procedure and possible pain ablation is being entertained.  Patient has been therapeutically optimized for the anticipated procedure and anesthesia.  This physical exam should serve for the second attempt at surgery on the left side in addition.  Chart was reviewed all medic medical questions asked were answered once again patient is approved for the anticipated surgery.    All other systems reviewed and are negative, other than those listed in the HPI.    Pertinent History  Do you have difficulty breathing or chest pain after walking up a flight of stairs: No  History of obstructive sleep apnea: No  Steroid use in the last 6 months: No  Frequent Aspirin/NSAID use: No  Prior Blood Transfusion: No  Prior Blood Transfusion Reaction: No  If for some reason prior to, during or after the procedure, if it is medically indicated, would you be willing to have a blood transfusion?:  There is no transfusion refusal.    Current Outpatient Medications   Medication Sig Dispense Refill     citalopram (CELEXA) 20 MG tablet Take 1 tablet (20 mg total) by mouth daily. 90 tablet 0     co-enzyme Q-10 30 mg capsule Take 30 mg by mouth 3 (three) times a day.       HYDROcodone-acetaminophen (NORCO)  mg per tablet Norco 10 mg-325 mg tablet   Take 1 tablet every 8 hours by oral route as needed for 30 days.       Lactobacillus rhamnosus GG (CULTURELLE) 10-15 Billion cell capsule Take 1 capsule by mouth daily.       magnesium oxide (MAGOX) 400 mg (241.3 mg magnesium) tablet Take 1 tablet (400 mg total) by mouth daily. 200 tablet 1     PREGNENOLONE MISC Use As Directed.       sildenafil, antihypertensive, (REVATIO) 20 mg tablet Take 1 tablet (20 mg total) by mouth 5 x daily PRN. 100 tablet 1     tamsulosin (FLOMAX) 0.4 mg cap Take 1 capsule (0.4 mg total) by mouth daily. 90 capsule 0      TESTOSTERONE, BULK, MISC Use As Directed.       thyroid, pork, (ARMOUR THYROID) 60 mg Tab Take 1 tablet (60 mg total) by mouth daily. 90 tablet 3     verapamil (VERELAN PM) 240 MG 24 hr capsule Take 1 capsule (240 mg total) by mouth daily. 90 capsule 3     No current facility-administered medications for this visit.         Allergies   Allergen Reactions     Penicillins        Patient Active Problem List   Diagnosis     Lower Back Pain     Anemia     Benign essential hypertension     Benign prostatic hyperplasia     Hypothyroidism     Ischemic stroke     Acute cystitis     Acute hypokalemia     Acute hyponatremia     RASHAD (acute kidney injury) (H)     Altered mental state     Chronic pain disorder     Degeneration of lumbar intervertebral disc     Luetscher's syndrome     Hypomagnesemia     Lumbar spondylosis     Nasal mass     Opioid dependence (H)       No past medical history on file.    No past surgical history on file.    Social History     Socioeconomic History     Marital status:      Spouse name: Not on file     Number of children: Not on file     Years of education: Not on file     Highest education level: Not on file   Occupational History     Not on file   Social Needs     Financial resource strain: Not on file     Food insecurity:     Worry: Not on file     Inability: Not on file     Transportation needs:     Medical: Not on file     Non-medical: Not on file   Tobacco Use     Smoking status: Never Smoker     Smokeless tobacco: Never Used   Substance and Sexual Activity     Alcohol use: Yes     Drug use: Not on file     Sexual activity: Not on file   Lifestyle     Physical activity:     Days per week: Not on file     Minutes per session: Not on file     Stress: Not on file   Relationships     Social connections:     Talks on phone: Not on file     Gets together: Not on file     Attends Baptism service: Not on file     Active member of club or organization: Not on file     Attends meetings of  "clubs or organizations: Not on file     Relationship status: Not on file     Intimate partner violence:     Fear of current or ex partner: Not on file     Emotionally abused: Not on file     Physically abused: Not on file     Forced sexual activity: Not on file   Other Topics Concern     Not on file   Social History Narrative     Not on file             Objective:     Vitals:    11/18/19 1028 11/18/19 1030   BP: 155/86 144/82   Pulse: 100    Weight: 165 lb 9.6 oz (75.1 kg)    Height: 5' 8\" (1.727 m)          Physical Exam:  General Appearance:  Alert, cooperative, no distress  Head:  Normocephalic, no obvious abnormality  Ears: TM anatomy normal  Eyes:  PERRL, EOM's intact, conjunctiva and corneas clear  Nose:  Nares symmetrical, septum midline, mucosa pink, no sinus tenderness  Throat:  Lips, tongue, and mucosa are moist, pink, and intact  Neck:  Supple, symmetrical, trachea midline, no adenopathy; thyroid: no enlargement, symmetric,no tenderness/mass/nodules; no carotid bruit, no JVD  Back:  Symmetrical, no curvature, ROM normal, no CVA tenderness  Chest/Breast:  No mass or tenderness  Lungs:  Clear to auscultation bilaterally, respirations unlabored   Heart:  Normal PMI, regular rate & rhythm, S1 and S2 normal, no murmurs, rubs, or gallops  Abdomen:  Soft, non-tender, bowel sounds active all four quadrants, no mass, or organomegaly  Musculoskeletal:  Tone and strength strong and symmetrical, all extremities  Lymphatic:  No adenopathy  Skin/Hair/Nails:  Skin warm, dry, and intact, no rashes  Neurologic:  Alert and oriented x3, no cranial nerve deficits, normal strength and tone, gait is wide-based and patient is complaining of pain in both right lower lumbar region and some pain in the left lower lumbar region patient is unable to heel-to-toe; patient is splinting because of pain  Extremities:  No edema.  Rosa's sign negative.    Genitourinary: deferred  Pulses:  Equal bilaterally    There are no Patient " Instructions on file for this visit.        Labs:  Labs pending at this time.  Results will be reviewed when available.    Immunization History   Administered Date(s) Administered     Influenza high dose,seasonal,PF, 65+ yrs 12/28/2015, 01/11/2017, 01/10/2018     Pneumo Conj 13-V (2010&after) 12/28/2015     Pneumo Polysac 23-V 01/11/2017     Tdap 01/10/2018           Electronically signed by Zac Rushing MD 11/18/19 10:30 AM

## 2021-06-04 NOTE — TELEPHONE ENCOUNTER
Refill Approved    Rx renewed per Medication Renewal Policy. Medication was last renewed on 9/30/19.    Serena Hinojosa, [facility] Triage/Med Refill 12/11/2019     Requested Prescriptions   Pending Prescriptions Disp Refills     citalopram (CELEXA) 20 MG tablet 90 tablet 0     Sig: Take 1 tablet (20 mg total) by mouth daily.       SSRI Refill Protocol  Passed - 12/9/2019 12:19 PM        Passed - PCP or prescribing provider visit in last year     Last office visit with prescriber/PCP: 8/5/2019 Zac Rushing MD OR same dept: 8/5/2019 Zac Rushing MD OR same specialty: 8/5/2019 Zac Rushing MD  Last physical: 11/18/2019 Last MTM visit: Visit date not found   Next visit within 3 mo: Visit date not found  Next physical within 3 mo: Visit date not found  Prescriber OR PCP: Zac Rushing MD  Last diagnosis associated with med order: 1. 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  Dispense: 90 tablet; Refill: 0    2. BPH (benign prostatic hyperplasia)  - tamsulosin (FLOMAX) 0.4 mg cap; Take 1 capsule (0.4 mg total) by mouth daily.  Dispense: 90 capsule; Refill: 0    If protocol passes may refill for 12 months if within 3 months of last provider visit (or a total of 15 months).             tamsulosin (FLOMAX) 0.4 mg cap 90 capsule 0     Sig: Take 1 capsule (0.4 mg total) by mouth daily.       Alfuzosin/Tamsulosin/Silodosin Refill Protocol  Passed - 12/9/2019 12:19 PM        Passed - PCP or prescribing provider visit in past 12 months       Last office visit with prescriber/PCP: 8/5/2019 Zac Rushing MD OR same dept: 8/5/2019 Zac Rushing MD OR same specialty: 8/5/2019 Zac Rushing MD  Last physical: 11/18/2019 Last MTM visit: Visit date not found   Next visit within 3 mo: Visit date not found  Next physical within 3 mo: Visit date not found  Prescriber OR PCP: Zac Rushing MD  Last  diagnosis associated with med order: 1. 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  Dispense: 90 tablet; Refill: 0    2. BPH (benign prostatic hyperplasia)  - tamsulosin (FLOMAX) 0.4 mg cap; Take 1 capsule (0.4 mg total) by mouth daily.  Dispense: 90 capsule; Refill: 0    If protocol passes may refill for 12 months if within 3 months of last provider visit (or a total of 15 months).

## 2021-06-05 VITALS — HEIGHT: 69 IN | BODY MASS INDEX: 23.99 KG/M2 | WEIGHT: 162 LBS

## 2021-06-05 VITALS
HEIGHT: 69 IN | BODY MASS INDEX: 24.44 KG/M2 | HEIGHT: 69 IN | WEIGHT: 165 LBS | BODY MASS INDEX: 24.44 KG/M2 | WEIGHT: 165 LBS

## 2021-06-05 VITALS
WEIGHT: 162 LBS | BODY MASS INDEX: 23.92 KG/M2 | DIASTOLIC BLOOD PRESSURE: 82 MMHG | HEART RATE: 71 BPM | SYSTOLIC BLOOD PRESSURE: 116 MMHG | OXYGEN SATURATION: 96 %

## 2021-06-05 VITALS
SYSTOLIC BLOOD PRESSURE: 90 MMHG | OXYGEN SATURATION: 94 % | WEIGHT: 175 LBS | BODY MASS INDEX: 26.61 KG/M2 | HEART RATE: 72 BPM | DIASTOLIC BLOOD PRESSURE: 70 MMHG

## 2021-06-08 NOTE — PROGRESS NOTES
Assessment and Plan:   She will return in one year's time for repeat examination    1. Medicare annual wellness visit, initial  Workup to include  - Comprehensive Metabolic Panel  - Urinalysis-UC if Indicated  - HM1(CBC and Differential)  - HM1 (CBC with Diff)    2. Hypothyroidism, unspecified type  She will continue on present dosage of thyroid  - Thyroid Cascade    3. Benign essential hypertension  Continue antihypertensive medications without change  - Electrocardiogram Perform - Clinic    4. Prostatism  Prostate was normal.  The following will be ordered because of medication  - PSA (Prostatic-Specific Antigen), Annual Screen    5. CKD (chronic kidney disease) stage 3, GFR 30-59 ml/min  We will watchfully observe his BUNs creatinine and GFR  - Comprehensive Metabolic Panel  - Urinalysis-UC if Indicated  - Vitamin D, Total (25-Hydroxy)    6. Hyperlipidemia  Continue on statin  - Lipid [place]    7. Health care maintenance  Immunizations will be updated  - Pneumococcal polysaccharide vaccine 23-valent 3 yo or older, subq/IM  - Influenza High Dose, Seasonal 65+ yrs      The patient's current medical problems were reviewed.    The following high BMI interventions were performed this visit: encouragement to exercise  The following health maintenance schedule was reviewed with the patient and provided in printed form in the after visit summary:   Health Maintenance   Topic Date Due     TD 18+ HE  11/19/1958     ZOSTER VACCINE  11/19/2001     PNEUMOCOCCAL POLYSACCHARIDE VACCINE AGE 65 AND OVER  11/19/2006     INFLUENZA VACCINE RULE BASED (1) 08/01/2016     COLONOSCOPY  01/01/2018     FALL RISK ASSESSMENT  01/11/2018     ADVANCE DIRECTIVES DISCUSSED WITH PATIENT  12/28/2020     PNEUMOCOCCAL CONJUGATE VACCINE FOR ADULTS (PCV13 OR PREVNAR)  Completed        Subjective:   Chief Complaint: 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  Subsequent workup and evaluation evolved into causative agents resulting in CK D stage III thought to be due to use of Motrin for back pain.  Motrin was discontinued BUN/creatinine, GFR reexamined in June.  Tests were back to normal.  Patient has not taken any anti-inflammatories of any nature takes pain medication prescribed and monitored by pain specialist was also giving him injections for his back.  We anticipate that his test will be normal.  Medications were reviewed today.  Patient is borderline hypothyroid takes [place] Thyroid 60 mg daily.  Hypertensive medications include verapamil, and Avalide.  Patient denies any symptoms.  No headaches, visual problems, says his hearing is good.  No dysphagia, shortness of breath, dyspnea, chest pain, angina, abdominal pain, diarrhea, constipation, urgency, frequency, dysuria, takes an occasional Cialis for ED.  Denies any disturbances with gait or station.  Says he exercises 3-5 times per week.  Recent colonoscopy negative.  Medical decision making stay the course evolves.  Medications and do the appropriate workup as mentioned in the plan    Review of Systems:  Outlined as above  Please see above.  The rest of the review of systems are negative for all systems.    Patient Care Team:  Zac Rushing MD as PCP - General  Florecita S Khazaeli, PharmD as Pharmacist (Pharmacist)     Patient Active Problem List   Diagnosis     Lower Back Pain     Anemia     Benign essential hypertension     Benign prostatic hypertrophy     Hypothyroidism     Ischemic stroke     No past medical history on file.   No past surgical history on file.   History reviewed. No pertinent family history.   Social History     Social History     Marital status:      Spouse name: N/A     Number of children: N/A     Years of education: N/A     Occupational History     Not on file.     Social History Main Topics     Smoking status: Never Smoker  "    Smokeless tobacco: Never Used     Alcohol use Yes     Drug use: Not on file     Sexual activity: Not on file     Other Topics Concern     Not on file     Social History Narrative      Current Outpatient Prescriptions   Medication Sig Dispense Refill     co-enzyme Q-10 30 mg capsule Take 30 mg by mouth 3 (three) times a day.       esomeprazole (NEXIUM) 40 MG capsule Take 40 mg by mouth daily as needed.       finasteride (PROSCAR) 5 mg tablet Take 0.5 tablets (2.5 mg total) by mouth daily. 45 tablet 1     HYDROcodone-acetaminophen (NORCO )  mg per tablet Take 1 tablet by mouth every 6 (six) hours as needed for pain. 60 tablet 0     irbesartan-hydrochlorothiazide (AVALIDE) 300-12.5 mg per tablet TAKE 1 TABLET BY MOUTH DAILY. 90 tablet 1     Lactobacillus rhamnosus GG (CULTURELLE) 10-15 Billion cell capsule Take 1 capsule by mouth daily.       modafinil (PROVIGIL) 200 MG tablet Take 1 tablet (200 mg total) by mouth daily. 90 tablet 0     potassium chloride (KLOR-CON) 10 MEQ CR tablet Take 1 tablet (10 mEq total) by mouth daily. 90 tablet 3     PREGNENOLONE MISC Use As Directed.       propranolol (INDERAL) 40 MG tablet Take 1 tablet (40 mg total) by mouth daily. 90 tablet 3     quiNINE sulfate (QUALAQUIN) 324 mg cap Take 1 capsule (324 mg total) by mouth every 8 (eight) hours as needed. 90 capsule 0     tamsulosin (FLOMAX) 0.4 mg Cp24 Take 1 capsule (0.4 mg total) by mouth daily. 90 capsule 3     thyroid, pork, (ARMOUR THYROID) 60 mg Tab Take 1 tablet (60 mg total) by mouth daily. 90 tablet 3     verapamil (VERELAN PM) 240 MG 24 hr capsule Take 1 capsule (240 mg total) by mouth daily. As directed. 90 capsule 0     No current facility-administered medications for this visit.       Objective:   Vital Signs:   Visit Vitals     /68     Pulse 61     Ht 5' 9\" (1.753 m)     Wt 172 lb (78 kg)     SpO2 93%     BMI 25.4 kg/m2        VisionScreening:   Visual Acuity Screening    Right eye Left eye Both eyes "   Without correction: 20/40 20/40 20/40   With correction:           PHYSICAL EXAM  General Appearance:  Alert, cooperative, no distress  Head:  Normocephalic, no obvious abnormality  Ears: TM anatomy normal  Eyes:  PERRL, EOM's intact, conjunctiva and corneas clear  Nose:  Nares symmetrical, septum midline, mucosa pink, no sinus tenderness  Throat:  Lips, tongue, and mucosa are moist, pink, and intact  Neck:  Supple, symmetrical, trachea midline, no adenopathy; thyroid: no enlargement, symmetric,no tenderness/mass/nodules; no carotid bruit, no JVD  Back:  Symmetrical, no curvature, ROM normal, no CVA tenderness  Chest/Breast:  No mass or tenderness  Lungs:  Clear to auscultation bilaterally, respirations unlabored   Heart:  Normal PMI, regular rate & rhythm, S1 and S2 normal, no murmurs, rubs, or gallops  Abdomen:  Soft, non-tender, bowel sounds active all four quadrants, no mass, or organomegaly  Musculoskeletal:  Tone and strength strong and symmetrical, all extremities  Lymphatic:  No adenopathy  Skin/Hair/Nails:  Skin warm, dry, and intact, no rashes  Neurologic:  Alert and oriented x3, no cranial nerve deficits, normal strength and tone, gait steady  Extremities:  No edema.  Rosa's sign negative.    Genitourinary: deferred  Pulses:  Equal bilaterally    Assessment Results 1/11/2017   Activities of Daily Living No help needed   Instrumental Activities of Daily Living No help needed   Mini Cog Total Score 5     A Mini-Cog score of 0-2 suggests the possibility of dementia, score of 3-5 suggests no dementia    Identified Health Risks:     The patient reports that he drinks more than one alcoholic drink per day but denies binge or excessive drinking. He was counseled and given information about possible harmful effects of excessive alcohol intake.  Information regarding advance directives (living shaw), including where he can download the appropriate form, was provided to the patient via the AVS.

## 2021-06-08 NOTE — TELEPHONE ENCOUNTER
Call him and tell him it is denied;no other alternative in the [place] he'll have to pay out of pocket unfortunatelly

## 2021-06-08 NOTE — TELEPHONE ENCOUNTER
Prior Authorization Request  Who s requesting:  Pharmacy  Pharmacy Name and Location: [id number] [place] [address] [address]   Medication Name: quiNINE sulfate (QUALAQUIN) 324 mg cap     Insurance Plan: nGAP  Insurance Member ID Number:  [id number]  CoverMyMeds Key: [id number]  Informed patient that prior authorizations can take up to 10 business days for response:   NA  Okay to leave a detailed message: Yes

## 2021-06-08 NOTE — TELEPHONE ENCOUNTER
Central PA team  [phone]  Pool: HE PA MED ([id number])          PA has been initiated.       PA form completed and faxed insurance via Cover My Meds     Key: [id number]     Medication:  quiNINE sulfate (QUALAQUIN) 324 mg cap     Insurance:  [employer]        Response will be received via fax and may take up to 5-10 business days depending on plan

## 2021-06-09 NOTE — PROGRESS NOTES
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      The patient has been notified of following:     \"This telephone visit will be conducted via a call between you and your physician/provider. We have found that certain health care needs can be provided without the need for a physical exam.  This service lets us provide the care you need with a short phone conversation.  If a prescription is necessary we can send it directly to your pharmacy.  If lab work is needed we can place an order for that and you can then stop by our lab to have the test done at a later time.    Telephone visits are billed at different rates depending on your insurance coverage. During this emergency period, for some insurers they may be billed the same as an in-person visit.  Please reach out to your insurance provider with any questions.    If during the course of the call the physician/provider feels a telephone visit is not appropriate, you will not be charged for this service.\"    Patient has given verbal consent to a Telephone visit? Yes    What phone number would you like to be contacted at? [phone]    Patient would like to receive their AVS by AVS Preference: [place].    Additional provider notes: This is a telephone visit conducted between the patient and myself.  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  Some of his help found him on the floor and he did not remember falling down.  Patient apparently has had unwitnessed drop attacks prior to this.  At my insistence the patient was taken to [place] emergency room where a work-up was done he was found to be mildly hyponatremic hypoalbuminemic and with CKD with an elevated creatinine and BUN.  Patient was advised to stay in the hospital overnight but he did not want to because of fear of COVID.  Take at home eventually recovered has had no " syncopal episodes since that time and is playing golf 2-3 times per week.  He states however that he is having difficulty sometimes postprandially and preprandially getting his food down and accompanied by some nausea and some bloating .  This is a new finding.  Our concern is for presbyesophagus.  The patient does not really define reflux clinically.  His weight has dropped a bit in the last month.  His emergency room weight was 165 pounds today states he is 153 pounds although there may be some variance in the scales.  I will place a referral for gastroenterology.  I advised him with his CKD to cut his medications back to minimal to include Celexa but not to be on any antihypertensives or thyroid medication.  Time will tell her success will follow along with our consultants telephone time was 19 minutes including review of systems and review of his Holter monitor which incidentally was normal with some rare PVCs and PACs interpreted as normal for age.    Assessment/Plan:  1.  Drop attack-plan is to recheck his kidney functions in 2 months #2 weight loss-referral to gastroenterology #3 chronic pain syndrome he is following with the pain clinic      Phone call duration:  19 minutes    Danielle Nair CMA

## 2021-06-09 NOTE — TELEPHONE ENCOUNTER
GAVI ESTRADA spoke with patient and helped arrange a lab only appointment on Monday for the thyroid labs. He was provided the telephone number for heart care to arrange the holter monitor and advised to arrange a follow-up visit with you once these tests are completed.

## 2021-06-09 NOTE — TELEPHONE ENCOUNTER
Left message to call back for: status update  Information to relay to patient:  Please notify patient Dr. Rushing would like him to complete a holter monitor and thyroid cascade prior to prescribing medication and to follow-up on his hospital stay at [facility] on 6/7/2020. After he completed these tests he should arrange a virtual visit with Dr. Rushing to review.

## 2021-06-11 NOTE — TELEPHONE ENCOUNTER
Refill Approved    Rx renewed per Medication Renewal Policy. Medication was last renewed on 12/11/19, last OV 7/10/20.    Gabrielle Valdes, [facility] Triage/Med Refill 9/19/2020     Requested Prescriptions   Pending Prescriptions Disp Refills     tamsulosin (FLOMAX) 0.4 mg cap [Pharmacy Med Name: Tamsulosin HCl Oral Capsule 0.4 MG] 90 capsule 0     Sig: Take 1 capsule (0.4 mg total) by mouth daily.       Alfuzosin/Tamsulosin/Silodosin Refill Protocol  Passed - 9/17/2020 12:15 PM        Passed - PCP or prescribing provider visit in past 12 months       Last office visit with prescriber/PCP: 8/5/2019 Zac Rushing MD OR same dept: Visit date not found OR same specialty: 8/5/2019 Zac Rushing MD  Last physical: 11/18/2019 Last MTM visit: Visit date not found   Next visit within 3 mo: Visit date not found  Next physical within 3 mo: Visit date not found  Prescriber OR PCP: Zac Rushing MD  Last diagnosis associated with med order: 1. BPH (benign prostatic hyperplasia)  - tamsulosin (FLOMAX) 0.4 mg cap [Pharmacy Med Name: Tamsulosin HCl Oral Capsule 0.4 MG]; Take 1 capsule (0.4 mg total) by mouth daily.  Dispense: 90 capsule; Refill: 0    If protocol passes may refill for 12 months if within 3 months of last provider visit (or a total of 15 months).

## 2021-06-13 NOTE — PROGRESS NOTES
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 He spent a lot of time in his younger career with an ENT and feels quite comfortable with many ENT issues. He states that he has not had any sinus symptoms at all. He had a head CT done as part of a nausea workup through his GI physician and there were some findings on that CT in the sinuses that he thought should be looked at. 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 He informs me that he had a PRITs performed by ENT at the , during which time it was noted that he had a superior vault mass that was resected as well. 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 His only nasal symptom is some mild congestion when he lays down at night. He also has a diminished sense of smell since his hamartoma resection. Occasionally uses nasonex; not using any nasal saline.     Past medical history, surgical history, social history, family history, medications, and allergies have been reviewed with the patient and are documented above.    Review of Systems: a 10-system review was performed. Pertinent positives are noted in the HPI and on a separate scanned document in the chart.    PHYSICAL EXAMINATION:  GEN: no acute distress, normocephalic  EYES: extraocular movements are intact, pupils are equal and round. Sclera clear.   EARS: auricles are normally formed. The external auditory canals are clear with minimal to no cerumen. Tympanic membranes are intact bilaterally with no signs of infection, effusion, retractions, or perforations.  NOSE: anterior nares are patent. There are no masses or lesions. The septum is non-obstructing, turbinates not enlarged. No percussive tenderness over the maxillary or frontal sinuses.   NASAL ENDOSCOPY: OMCs patent bilaterally without purulence or polyps. There appears to be recurrence of the hamartoma in the superior nasal vault  without any concerning features such as obstruction, ulceration, etc.   OC/OP: clear, dentition is in good repair. The tongue and palate are fully mobile and symmetric. No masses or lesions.  NECK: soft and supple. No lymphadenopathy or masses. Airway is midline.  NEURO: CN VII and XII symmetric. alert and oriented. No spontaneous nystagmus. Gait is normal.  PULM: breathing comfortably on room air, normal chest expansion with respiration  CARDS: no cyanosis or clubbing. Normal carotid pulses.    CT HEAD ( outside images and report reviewed): circumferential thickening of the mucosa lining the sphenoid sinuses with some bubbles within the mucus. There is also some opacification of the remaining ethmoid cells in the R>L. Large left maxillary retention cyst. Soft tissue density noted medial to the middle turbinates bilaterally, non-destructive.    MEDICAL DECISION-MAKING: This patient is a 80yo M with CT findings consistent with some chronic mucosal thickening in the sinuses without any symptoms or visible issues on exam. 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 It is non-obstructing and non-concerning. Going after it would be more likely to be detrimental than helpful. Discussed some nasal saline for mild intranasal dryness.

## 2021-06-15 NOTE — ANESTHESIA POSTPROCEDURE EVALUATION
Patient: Mayur LE TAYE Patel  Procedure(s):  TRANSRECTAL ULTRASOUND GUIDED PROSTATE BIOPSY  Anesthesia type: general    Patient location: Phase II Recovery  Last vitals:   Vitals Value Taken Time   /63 03/16/21 0847   Temp 36.8  C (98.3  F) 03/16/21 0811   Pulse 81 03/16/21 0852   Resp 16 03/16/21 0811   SpO2 95 % 03/16/21 0852   Vitals shown include unvalidated device data.  Post vital signs: stable  Level of consciousness: awake and responds to simple questions  Post-anesthesia pain: pain controlled  Post-anesthesia nausea and vomiting: no  Pulmonary: unassisted, return to baseline  Cardiovascular: stable and blood pressure at baseline  Hydration: adequate  Anesthetic events: no    QCDR Measures:  ASA# 11 - [place]ruz-op Cardiac Arrest: ASA11B - Patient did NOT experience unanticipated cardiac arrest  ASA# 12 - Marycruz-op Mortality Rate: ASA12B - Patient did NOT die  ASA# 13 - PACU Re-Intubation Rate: NA - No ETT / LMA used for case  ASA# 10 - Composite Anes Safety: ASA10A - No serious adverse event    Additional Notes:

## 2021-06-15 NOTE — H&P (VIEW-ONLY)
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    Surgical Information:  Surgery/Procedure: Uro Lift   Surgery Location: [place]   Surgeon: Dr. Hathaway  Surgery Date: 3/16/21  Time of Surgery: 7  Where patient plans to recover: Home  Fax number for surgical facility: Note does not need to be faxed, will be available electronically in Epic.    Type of Anesthesia Anticipated: General    Assessment & Plan      The proposed surgical procedure is considered LOW risk.               Recommendation:  APPROVAL GIVEN to proceed with proposed procedure pending review of diagnostic evaluation.      {Independent interpretation of tests (Optional):919292::\"Independent interpretation of a test performed by another physician/other qualified health care professional (not separately reported) -      No LOS data to display          Subjective     HPI related to upcoming procedure: Patient has been seen for prostate problems by urology including urinary frequency urgency and nocturia 3-4 times which is interfering with his overall health.  His past medical history is well-known to me he does have chronic low back issues and currently is under the care of orthopedic surgery as well as chronic pain medicine.  He is ambulatory and is able to function with normal acts of daily living despite the fact that he is on chronic opioids.  He was seen in consultation and the decision was made to place a UroLift in the patient's urethra and he has been prescreened and is approved.  Based on today's physical examination he is not a good candidate for general anesthesia and hopefully discharge without any events related to the procedure.  I will do appropriate screening including an INR hemogram renal " profile.  I will also include a PSA as patient has not had that in the last year or 2.  All medical questions asked were answered I personally reviewed family social history surgeries allergies problems list      Health Care Directive:  Patient does not have a Health Care Directive or Living Will:     Preoperative Review of :   A  consultation and review was conducted by me personally    See problem list for active medical problems.  Problems all longstanding and stable, except as noted/documented.  See ROS for pertinent symptoms related to these conditions.      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  No past surgical history on file.  Current Outpatient Medications   Medication Sig Dispense Refill     acetaminophen (MAPAP, ACETAMINOPHEN,) 500 mg coapsule Take 2 capsules by mouth.       HYDROcodone-acetaminophen (NORCO)  mg per tablet Norco 10 mg-325 mg tablet   Take 1 tablet every 8 hours by oral route as needed for 30 days.       Lactobacillus rhamnosus GG (CULTURELLE) 10-15 Billion cell capsule Take 1 capsule by mouth daily.       magnesium oxide (MAGOX) 400 mg (241.3 mg magnesium) tablet Take 1 tablet (400 mg total) by mouth daily. 200 tablet 1     PREGNENOLONE MISC Use As Directed.       sildenafil,  antihypertensive, (REVATIO) 20 mg tablet Take 1 tablet (20 mg total) by mouth 5 x daily PRN. 100 tablet 1     tamsulosin (FLOMAX) 0.4 mg cap Take one capsule in the morning and one in the evening twelve hours apart 180 capsule 1     TESTOSTERONE, BULK, MISC Use As Directed.       citalopram (CELEXA) 20 MG tablet Take 1 tablet (20 mg total) by mouth daily. 90 tablet 3     quiNINE sulfate (QUALAQUIN) 324 mg cap Take 1 capsule (324 mg total) by mouth every 8 (eight) hours. 100 capsule 1     thyroid, pork, (ARMOUR THYROID) 60 mg Tab Take 1 tablet (60 mg total) by mouth daily. 90 tablet 3     No current facility-administered medications for this visit.        Allergies   Allergen Reactions     Penicillins        Social History     Tobacco Use     Smoking status: Never Smoker     Smokeless tobacco: Never Used   Substance Use Topics     Alcohol use: Yes        Social History     Substance and Sexual Activity   Drug Use Not on file        Objective     Wt 175 lb (79.4 kg)   BMI 26.61 kg/m    Physical Exam  General Appearance:  Alert, cooperative, no distress  Head:  Normocephalic, no obvious abnormality  Ears: TM anatomy normal  Eyes:  PERRL, EOM's intact, conjunctiva and corneas clear  Nose:  Nares symmetrical, septum midline, mucosa pink, no sinus tenderness  Throat:  Lips, tongue, and mucosa are moist, pink, and intact  Neck:  Supple, symmetrical, trachea midline, no adenopathy; thyroid: no enlargement, symmetric,no tenderness/mass/nodules; no carotid bruit, no JVD  Back:  Symmetrical, no curvature, ROM normal, no CVA tenderness  Chest/Breast:  No mass or tenderness  Lungs:  Clear to auscultation bilaterally, respirations unlabored   Heart:  Normal PMI, regular rate & rhythm, S1 and S2 normal, no murmurs, rubs, or gallops  Abdomen:  Soft, non-tender, bowel sounds active all four quadrants, no mass, or organomegaly  Musculoskeletal:  Tone and strength strong and symmetrical, all extremities  Lymphatic:  No  adenopathy  Skin/Hair/Nails:  Skin warm, dry, and intact, no rashes  Neurologic:  Alert and oriented x3, no cranial nerve deficits, normal strength and tone, gait steady  Extremities:  No edema.  Rosa's sign negative.    Genitourinary: deferred  Pulses:  Equal bilaterally    Recent Labs   Lab Test 11/18/19  1053 08/05/19  1051 06/12/19  1146   HGB 14.9 13.3* 11.2*    248 351   NA  --  138 138   K 4.0 4.7 4.6   CREATININE  --  1.12 1.15        PRE-OP Diagnostics:   Labs pending at this time. Results will be reviewed when available.  Normal EKG normal variant seen on LVH criteria    REVISED CARDIAC RISK INDEX (RCRI)   The patient has the following serious cardiovascular risks for perioperative complications:   - No serious cardiac risks = 0 points    RCRI INTERPRETATION: 0 points: Class I (very low risk - 0.4% complication rate)           Signed Electronically by: Zac Rushing MD    Copy of this evaluation report is provided to requesting physician.    [facility]    Revised Cardiac Risk Index

## 2021-06-15 NOTE — PROGRESS NOTES
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    Surgical Information:  Surgery/Procedure: Uro Lift   Surgery Location: [place]   Surgeon: Dr. Hathaway  Surgery Date: 3/16/21  Time of Surgery: 7  Where patient plans to recover: Home  Fax number for surgical facility: Note does not need to be faxed, will be available electronically in Epic.    Type of Anesthesia Anticipated: General    Assessment & Plan      The proposed surgical procedure is considered LOW risk.               Recommendation:  APPROVAL GIVEN to proceed with proposed procedure pending review of diagnostic evaluation.      {Independent interpretation of tests (Optional):196680::\"Independent interpretation of a test performed by another physician/other qualified health care professional (not separately reported) -      No LOS data to display          Subjective     HPI related to upcoming procedure: Patient has been seen for prostate problems by urology including urinary frequency urgency and nocturia 3-4 times which is interfering with his overall health.  His past medical history is well-known to me he does have chronic low back issues and currently is under the care of orthopedic surgery as well as chronic pain medicine.  He is ambulatory and is able to function with normal acts of daily living despite the fact that he is on chronic opioids.  He was seen in consultation and the decision was made to place a UroLift in the patient's urethra and he has been prescreened and is approved.  Based on today's physical examination he is not a good candidate for general anesthesia and hopefully discharge without any events related to the procedure.  I will do appropriate screening including an INR hemogram renal " profile.  I will also include a PSA as patient has not had that in the last year or 2.  All medical questions asked were answered I personally reviewed family social history surgeries allergies problems list      Health Care Directive:  Patient does not have a Health Care Directive or Living Will:     Preoperative Review of :   A  consultation and review was conducted by me personally    See problem list for active medical problems.  Problems all longstanding and stable, except as noted/documented.  See ROS for pertinent symptoms related to these conditions.      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  No past surgical history on file.  Current Outpatient Medications   Medication Sig Dispense Refill     acetaminophen (MAPAP, ACETAMINOPHEN,) 500 mg coapsule Take 2 capsules by mouth.       HYDROcodone-acetaminophen (NORCO)  mg per tablet Norco 10 mg-325 mg tablet   Take 1 tablet every 8 hours by oral route as needed for 30 days.       Lactobacillus rhamnosus GG (CULTURELLE) 10-15 Billion cell capsule Take 1 capsule by mouth daily.       magnesium oxide (MAGOX) 400 mg (241.3 mg magnesium) tablet Take 1 tablet (400 mg total) by mouth daily. 200 tablet 1     PREGNENOLONE MISC Use As Directed.       sildenafil,  antihypertensive, (REVATIO) 20 mg tablet Take 1 tablet (20 mg total) by mouth 5 x daily PRN. 100 tablet 1     tamsulosin (FLOMAX) 0.4 mg cap Take one capsule in the morning and one in the evening twelve hours apart 180 capsule 1     TESTOSTERONE, BULK, MISC Use As Directed.       citalopram (CELEXA) 20 MG tablet Take 1 tablet (20 mg total) by mouth daily. 90 tablet 3     quiNINE sulfate (QUALAQUIN) 324 mg cap Take 1 capsule (324 mg total) by mouth every 8 (eight) hours. 100 capsule 1     thyroid, pork, (ARMOUR THYROID) 60 mg Tab Take 1 tablet (60 mg total) by mouth daily. 90 tablet 3     No current facility-administered medications for this visit.        Allergies   Allergen Reactions     Penicillins        Social History     Tobacco Use     Smoking status: Never Smoker     Smokeless tobacco: Never Used   Substance Use Topics     Alcohol use: Yes        Social History     Substance and Sexual Activity   Drug Use Not on file        Objective     Wt 175 lb (79.4 kg)   BMI 26.61 kg/m    Physical Exam  General Appearance:  Alert, cooperative, no distress  Head:  Normocephalic, no obvious abnormality  Ears: TM anatomy normal  Eyes:  PERRL, EOM's intact, conjunctiva and corneas clear  Nose:  Nares symmetrical, septum midline, mucosa pink, no sinus tenderness  Throat:  Lips, tongue, and mucosa are moist, pink, and intact  Neck:  Supple, symmetrical, trachea midline, no adenopathy; thyroid: no enlargement, symmetric,no tenderness/mass/nodules; no carotid bruit, no JVD  Back:  Symmetrical, no curvature, ROM normal, no CVA tenderness  Chest/Breast:  No mass or tenderness  Lungs:  Clear to auscultation bilaterally, respirations unlabored   Heart:  Normal PMI, regular rate & rhythm, S1 and S2 normal, no murmurs, rubs, or gallops  Abdomen:  Soft, non-tender, bowel sounds active all four quadrants, no mass, or organomegaly  Musculoskeletal:  Tone and strength strong and symmetrical, all extremities  Lymphatic:  No  adenopathy  Skin/Hair/Nails:  Skin warm, dry, and intact, no rashes  Neurologic:  Alert and oriented x3, no cranial nerve deficits, normal strength and tone, gait steady  Extremities:  No edema.  Rosa's sign negative.    Genitourinary: deferred  Pulses:  Equal bilaterally    Recent Labs   Lab Test 11/18/19  1053 08/05/19  1051 06/12/19  1146   HGB 14.9 13.3* 11.2*    248 351   NA  --  138 138   K 4.0 4.7 4.6   CREATININE  --  1.12 1.15        PRE-OP Diagnostics:   Labs pending at this time. Results will be reviewed when available.  Normal EKG normal variant seen on LVH criteria    REVISED CARDIAC RISK INDEX (RCRI)   The patient has the following serious cardiovascular risks for perioperative complications:   - No serious cardiac risks = 0 points    RCRI INTERPRETATION: 0 points: Class I (very low risk - 0.4% complication rate)           Signed Electronically by: Zac Rushing MD    Copy of this evaluation report is provided to requesting physician.    [facility] Guidelines    Revised Cardiac Risk Index

## 2021-06-15 NOTE — ANESTHESIA PREPROCEDURE EVALUATION
Anesthesia Evaluation      Patient summary reviewed     Airway   Mallampati: II  Neck ROM: full   Pulmonary - negative ROS and normal exam    breath sounds clear to auscultation                         Cardiovascular - negative ROS and normal exam  Rhythm: regular  Rate: normal,         Neuro/Psych    (+) CVA , chronic pain    Endo/Other - negative ROS      GI/Hepatic/Renal    (+)   chronic renal disease CRI,     Comments: BPH          Dental - normal exam                        Anesthesia Plan  Planned anesthetic: MAC    ASA 2   Induction: intravenous   Anesthetic plan and risks discussed with: patient  Anesthesia plan special considerations: antiemetics,   Post-op plan: routine recovery

## 2021-06-15 NOTE — TELEPHONE ENCOUNTER
New Appointment Needed  What is the reason for the visit:    Pre-Op Appt Request  When is the surgery? :  3/16, ALSO NEEDS COVID TEST  Where is the surgery?:   [facility]  Who is the surgeon? :  Dr. Hathaway  What type of surgery is being done?: Uro (sp?) lift   Provider Preference: PCP only  How soon do you need to be seen?: Please try to fit him in last week of February or first week in March  Waitlist offered?: Yes, added to wait list  Okay to leave a detailed message:  Yes

## 2021-06-15 NOTE — OP NOTE
Location: [facility]    Patient Name: Mayur Patel DDS  Patient : 1940  Patient MRN: [id number]  Patient CSN: [id number]  Patient PCP: Zac Rushing MD    Date of Service: 21     Pre-operative diagnosis: Elevated PSA    Post-operative diagnosis: Elevated PSA    Procedure:  Transrectal ultrasound prostate biopsy    Surgeon: Dr. Frandy Hathaway    EBL: 0 mL    Anesthesia: MAC    Indication: 80 y.o. male with an elevated PSA of 8.5 ng/mL.  I recommended a prostate biopsy and patient requested that it be done under anesthesia.  Risks, benefits, and alternatives to treatment were discussed with the patient and the patient elected to proceed.    Specimens: Right lateral base, right lateral mid, right lateral apex, right medial base, right medial mid, right medial apex, left lateral base, left lateral mid, left lateral apex, left medial base, left medial mid, left medial apex    Procedure:  After written informed consent was obtained the patient was brought into the operating room.  The patient was properly identified prior to the procedure, received preprocedure antibiotics, and had sequential compression devices on the legs.  The patient was then induced under anesthesia.    He was positioned in a comfortable left lateral decubitus position with hips and knees acutely flexed.    Rectal Exam: 40 grams without nodules    The lubricated rectal probe was inserted into the rectum without difficulty. 5 cc of 1% Lidocaine without epinephrine was instilled with a needle using ultrasound guidance near the junction of each seminal vesicle and the prostate bilaterally.    Sequential transverse (axial) scans were made in small increments beginning at the seminal vesicles and ending at the prostatic apex. Sequential longitudinal (sagittal) scans were made in small increments beginning at one side of the prostate and ending at the other. Excellent anatomical imaging was obtained. The peripheral,  transitional, and central zones were well-defined.     The seminal vesicles were normal.    Prostate Volume (elliptical): Approximately 56.2 cc.    There were no hypoechoic areas. 12 biopsies were performed at the above locations    At this point, the procedure was completed.  Patient tolerated the procedure well.    Plan: Home. Dr. Hathaway to call with results.    Frandy Hathaway  8:22 AM

## 2021-06-15 NOTE — PROGRESS NOTES
Assessment and Plan:   77-year-old who presents for annual wellness exam was been under my care for many years for medical problems including benign essential hypertension which he takes I be certain hydrochlorothiazide 1 daily does take Inderal 80 mg daily; prostatism managed with propranolol 40 mg 1 twice daily patient also takes multiple vitamin supplements and antiaging supplements that we do not prescribe here.  He has been on thyroid replacement for many years.  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  He denies any visual problems hearing problems dysphagia shortness of breath dyspnea chest pain abdominal pain diarrhea constipation he is due for a colon exam will probably do cold guard on him he denies any urgency he has nocturia 1 to 2X no hematuria no disturbance of gait or station.  Patient does report a very fine tremor which seems to come and go depending on time of day usually worse at the end of the day.  After he has been on his feet.  Patient has chronic low back problems and is being managed by pain control and spine care.  I have personally reviewed family and social history surgeries allergies problems list plan is to continue him on his medications pending test results an EKG was reviewed    1. Medicare annual wellness visit, initial  Workup to include  - Comprehensive Metabolic Panel  - Urinalysis-UC if Indicated  - HM1(CBC and Differential)  - HM1 (CBC with Diff)    2. Benign essential hypertension  Continue following medications  - Comprehensive Metabolic Panel  - Electrocardiogram Perform - Clinic  - Lipid [place]  - propranolol (INDERAL) 40 MG tablet; Take 1 tablet (40 mg total) by mouth daily.  Dispense: 90 tablet; Refill: 3    3. Hypothyroidism, unspecified type  Continue [place] Thyroid we will check his thyroid [place]  - Thyroid [place]    4. Ischemic stroke  No recent signs of any TIA  - Comprehensive Metabolic Panel  - Lipid Cascade    5. Excessive daytime  sleepiness  Patient may continue to take Provigil  - Comprehensive Metabolic Panel  - HM1(CBC and Differential)  - HM1 (CBC with Diff)    6. Prostatism  Prostatism will be managed with alpha blockade I will check his following PSA  - PSA (Prostatic-Specific Antigen), Annual Screen    7. Essential hypertension  Continue use of verapamil in addition to  - verapamil (VERELAN PM) 240 MG 24 hr capsule; Take 1 capsule (240 mg total) by mouth daily.  Dispense: 90 capsule; Refill: 3    8. BPH (benign prostatic hyperplasia)  Continue the following  - tamsulosin (FLOMAX) 0.4 mg Cp24; Take 1 capsule (0.4 mg total) by mouth daily.  Dispense: 90 capsule; Refill: 3       The patient's current medical problems were reviewed.    I have had an Advance Directives discussion with the patient.  The following health maintenance schedule was reviewed with the patient and provided in printed form in the after visit summary: Health Maintenance   Topic Date Due     TD 18+ HE  11/19/1958     ZOSTER VACCINE  11/19/2001     INFLUENZA VACCINE RULE BASED (1) 08/01/2017     FALL RISK ASSESSMENT  01/11/2018     ADVANCE DIRECTIVES DISCUSSED WITH PATIENT  12/28/2020     PNEUMOCOCCAL POLYSACCHARIDE VACCINE AGE 65 AND OVER  Completed     PNEUMOCOCCAL CONJUGATE VACCINE FOR ADULTS (PCV13 OR PREVNAR)  Completed        Subjective:   Chief Complaint: Mayur PatelNISHAS is an 77 y.o. male here for an Annual Wellness visit.   HPI: Please see note under assessment and plan    Review of Systems: 14 point negative please see above.  The rest of the review of systems are negative for all systems.    Patient Care Team:  Zac Rushing MD as PCP - General  Florecita S Khazaeli, PharmD as Pharmacist (Pharmacist)     Patient Active Problem List   Diagnosis     Lower Back Pain     Anemia     Benign essential hypertension     Benign prostatic hypertrophy     Hypothyroidism     Ischemic stroke     No past medical history on file.   No past surgical history on file.    No family history on file.   Social History     Social History     Marital status:      Spouse name: N/A     Number of children: N/A     Years of education: N/A     Occupational History     Not on file.     Social History Main Topics     Smoking status: Never Smoker     Smokeless tobacco: Never Used     Alcohol use Yes     Drug use: Not on file     Sexual activity: Not on file     Other Topics Concern     Not on file     Social History Narrative      Current Outpatient Prescriptions   Medication Sig Dispense Refill     esomeprazole (NEXIUM) 40 MG capsule Take 40 mg by mouth daily as needed.       HYDROcodone-acetaminophen (NORCO )  mg per tablet Take 1 tablet by mouth every 6 (six) hours as needed for pain. 60 tablet 0     irbesartan-hydrochlorothiazide (AVALIDE) 300-12.5 mg per tablet TAKE ONE TABLET EVERY DAY 90 tablet 3     Lactobacillus rhamnosus GG (CULTURELLE) 10-15 Billion cell capsule Take 1 capsule by mouth daily.       modafinil (PROVIGIL) 200 MG tablet Take 1 tablet (200 mg total) by mouth daily. 30 tablet 0     modafinil (PROVIGIL) 200 MG tablet Take 1 tablet (200 mg total) by mouth daily. 30 tablet 1     propranolol (INDERAL) 40 MG tablet Take 1 tablet (40 mg total) by mouth daily. 90 tablet 3     quiNINE sulfate (QUALAQUIN) 324 mg cap Take 1 capsule (324 mg total) by mouth every 8 (eight) hours as needed. 90 capsule 3     sildenafil, antihypertensive, (REVATIO) 20 mg tablet Take 1 tablet (20 mg total) by mouth 5 x daily PRN. 100 tablet 1     tamsulosin (FLOMAX) 0.4 mg Cp24 Take 1 capsule (0.4 mg total) by mouth daily. 90 capsule 3     TESTOSTERONE, BULK, MISC Use As Directed.       verapamil (VERELAN PM) 240 MG 24 hr capsule Take 1 capsule (240 mg total) by mouth daily. 90 capsule 3     co-enzyme Q-10 30 mg capsule Take 30 mg by mouth 3 (three) times a day.       finasteride (PROSCAR) 5 mg tablet Take 0.5 tablets (2.5 mg total) by mouth daily. 45 tablet 1     potassium chloride  "(KLOR-CON) 10 MEQ CR tablet Take 1 tablet (10 mEq total) by mouth daily. 90 tablet 3     PREGNENOLONE MISC Use As Directed.       thyroid, pork, (ARMOUR THYROID) 60 mg Tab Take 1 tablet (60 mg total) by mouth daily. 90 tablet 3     No current facility-administered medications for this visit.       Objective:   Vital Signs:   Visit Vitals     /70     Pulse 68     Ht 5' 8.5\" (1.74 m)     Wt 171 lb 9.6 oz (77.8 kg)     BMI 25.71 kg/m2        VisionScreening:  No exam data present     PHYSICAL EXAM  General Appearance:  Alert, cooperative, no distress  Head:  Normocephalic, no obvious abnormality  Ears: TM anatomy normal  Eyes:  PERRL, EOM's intact, conjunctiva and corneas clear  Nose:  Nares symmetrical, septum midline, mucosa pink, no sinus tenderness  Throat:  Lips, tongue, and mucosa are moist, pink, and intact  Neck:  Supple, symmetrical, trachea midline, no adenopathy; thyroid: no enlargement, symmetric,no tenderness/mass/nodules; no carotid bruit, no JVD  Back:  Symmetrical, no curvature, ROM normal, no CVA tenderness  Chest/Breast:  No mass or tenderness  Lungs:  Clear to auscultation bilaterally, respirations unlabored   Heart:  Normal PMI, regular rate & rhythm, S1 and S2 normal, no murmurs, rubs, or gallops  Abdomen:  Soft, non-tender, bowel sounds active all four quadrants, no mass, or organomegaly  Musculoskeletal:  Tone and strength strong and symmetrical, all extremities  Lymphatic:  No adenopathy  Skin/Hair/Nails:  Skin warm, dry, and intact, no rashes  Neurologic:  Alert and oriented x3, no cranial nerve deficits, normal strength and tone, gait steady  Extremities:  No edema.  Rosa's sign negative.    Genitourinary: Prostate 1+ enlarged no nodules felt genitalia normal testes normal pulses:  Equal bilaterally    Assessment Results 1/10/2018   Activities of Daily Living No help needed   Instrumental Activities of Daily Living No help needed   Mini Cog Total Score 4   Some recent data might be " hidden     A Mini-Cog score of 0-2 suggests the possibility of dementia, score of 3-5 suggests no dementia    Identified Health Risks:     He is at risk for lack of exercise and has been provided with information to increase physical activity for the benefit of his well-being.  The patient reports that he drinks more than one alcoholic drink per day but denies binge or excessive drinking. He was counseled and given information about possible harmful effects of excessive alcohol intake.  The patient was counseled and encouraged to consider modifying their diet and eating habits. He was provided with information on recommended healthy diet options.  Information regarding advance directives (living shaw), including where he can download the appropriate form, was provided to the patient via the AVS.

## 2021-06-15 NOTE — INTERVAL H&P NOTE
I have performed an assessment and examined the patient, as necessary, to update the patient's current status that may have changed since the prior History and Physical.  The History & Physical has been reviewed and updates that have been made are as follows He has an elevated PSA that was checked at his pre-op physical.  The Urolift procedure was changed to prostate biopsy ultrasound.    Frandy Hathaway

## 2021-06-16 NOTE — PROGRESS NOTES
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    Surgical Information:  Surgery/Procedure: Urolifil  Surgery Location: [facility]  Surgeon: Frandy Hathaway  Surgery Date: 4/26/21  Time of Surgery: 7:00 am  Where patient plans to recover: At home alone  Fax number for surgical facility: Note does not need to be faxed, will be available electronically in Epic.    Type of Anesthesia Anticipated: Choice    Assessment & Plan      The proposed surgical procedure is considered LOW risk.    Preop general physical exam  Patient has been therapeutically optimized for the anticipated procedure the following laboratory will be performed his recent EKG was normal  - HM1(CBC and Differential)  - Potassium                 RECOMMENDATION:  APPROVAL GIVEN to proceed with proposed procedure, without further diagnostic evaluation.                      Subjective     HPI related to upcoming procedure: The patient is being scheduled for a UroLift procedure due to complications from benign prostatic hypertrophy; about 6 weeks ago the patient presented for preoperative exam for this procedure but here in primary care we did perform a PSA which was 4-5 times the patient's baseline.  Instead of the UroLift procedure the patient underwent a prostate biopsy which fortunately proved to be negative.  Apparently his elevated PSA was related to a previous cystoscopy for benign prostatic hypertrophy and possible inspection of the entry of the sacral area.  Patient now presents after prostate cancer has been ruled out for insertion of a UroLift prosthesis.  The procedure was explained to the patient by the urological surgeon and patient is therapeutically optimized to undergo the  procedure under the choice of anesthesia.  He may need a catheter postoperatively for 1    Preop Questions 4/19/2021   Have you ever had a heart attack or stroke? No   Have you ever had surgery on your heart or blood vessels, such as a stent placement, a coronary artery bypass, or surgery on an artery in your head, neck, heart, or legs? No   Do you have chest pain with activity? No   Do you have a history of  heart failure? No   Do you currently have a cold, bronchitis or symptoms of other infection? No   Do you have a cough, shortness of breath, or wheezing? No   Do you or anyone in your family have previous history of blood clots? No   Do you or does anyone in your family have a serious bleeding problem such as prolonged bleeding following surgeries or cuts? No   Have you ever had problems with anemia or been told to take iron pills? No   Have you had any abnormal blood loss such as black, tarry or bloody stools? No   Have you ever had a blood transfusion? No   Are you willing to have a blood transfusion if it is medically needed before, during, or after your surgery? Yes   Have you or any of your relatives ever had problems with anesthesia? No   Do you have sleep apnea, excessive snoring or daytime drowsiness? No   Do you have any artifical heart valves or other implanted medical devices like a pacemaker, defibrillator, or continuous glucose monitor? No   Do you have artificial joints? No   Are you allergic to latex? No     Health Care Directive:  Patient does not have a Health Care Directive or Living Will:     Preoperative Review of :    was inspected; patient is under chronic pain control from a pain control center for low back discomfort and is on opioids chronically; he will not need to be tapered from these medications prior to the procedure        Review of Systems  14 point was reviewed and is negative    Patient Active Problem List    Diagnosis Date Noted     Acute cystitis 06/04/2019     Acute  hypokalemia 06/04/2019     Acute hyponatremia 06/04/2019     RASHAD (acute kidney injury) (H) 06/04/2019     Altered mental state 06/04/2019     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     Hypomagnesemia 06/04/2019     Chronic pain disorder 10/11/2017     Opioid dependence (H) 10/11/2017     Degeneration of lumbar intervertebral disc 09/05/2017     Lumbar spondylosis 05/01/2017     Anemia 03/02/2015     Benign essential hypertension 03/02/2015     Benign prostatic hyperplasia 03/02/2015     Hypothyroidism 03/02/2015     Ischemic stroke 03/02/2015     Lower Back Pain      Nasal mass 01/22/2014     Past Medical History:   Diagnosis Date     Hypertension      Stroke (H)      Past Surgical History:   Procedure Laterality Date     BIOPSY PROSTATE NEEDLE / PUNCH / INCISIONAL N/A 3/16/2021    Procedure: TRANSRECTAL ULTRASOUND GUIDED PROSTATE BIOPSY;  Surgeon: Frandy Hathaway MD;  Location: [facility];  Service: Urology     Current Outpatient Medications   Medication Sig Dispense Refill     acetaminophen (MAPAP, ACETAMINOPHEN,) 500 mg coapsule Take 2 capsules by mouth.       HYDROcodone-acetaminophen (NORCO)  mg per tablet Norco 10 mg-325 mg tablet   Take 1 tablet every 8 hours by oral route as needed for 30 days.       magnesium oxide (MAGOX) 400 mg (241.3 mg magnesium) tablet Take 1 tablet (400 mg total) by mouth daily. 200 tablet 1     ondansetron (ZOFRAN-ODT) 2 MG Take 4 mg by mouth every 8 (eight) hours as needed for nausea.       quiNINE sulfate (QUALAQUIN) 324 mg cap Take 1 capsule (324 mg total) by mouth every 8 (eight) hours. 100 capsule 1     sildenafil, antihypertensive, (REVATIO) 20 mg tablet Take 1 tablet (20 mg total) by mouth 5 x daily PRN. 100 tablet 1     tamsulosin (FLOMAX) 0.4 mg cap Take one capsule in the morning and one in the evening twelve hours apart 180 capsule 1     TESTOSTERONE, BULK, MISC Use As Directed.       No current facility-administered medications for this visit.        Allergies    Allergen Reactions     Penicillins Rash       Social History     Tobacco Use     Smoking status: Former Smoker     Smokeless tobacco: Never Used   Substance Use Topics     Alcohol use: Yes     Comment: Social        Social History     Substance and Sexual Activity   Drug Use Never        Objective     /82   Pulse 71   Wt 162 lb (73.5 kg)   SpO2 96%   BMI 23.92 kg/m    Physical Exam  General Appearance:  Alert, cooperative, no distress  Head:  Normocephalic, no obvious abnormality  Ears: TM anatomy normal  Eyes:  PERRL, EOM's intact, conjunctiva and corneas clear  Nose:  Nares symmetrical, septum midline, mucosa pink, no sinus tenderness  Throat:  Lips, tongue, and mucosa are moist, pink, and intact  Neck:  Supple, symmetrical, trachea midline, no adenopathy; thyroid: no enlargement, symmetric,no tenderness/mass/nodules; no carotid bruit, no JVD  Back:  Symmetrical, no curvature, ROM normal, no CVA tenderness  Chest/Breast:  No mass or tenderness  Lungs:  Clear to auscultation bilaterally, respirations unlabored   Heart:  Normal PMI, regular rate & rhythm, S1 and S2 normal, no murmurs, rubs, or gallops  Abdomen:  Soft, non-tender, bowel sounds active all four quadrants, no mass, or organomegaly  Musculoskeletal:  Tone and strength strong and symmetrical, all extremities  Lymphatic:  No adenopathy  Skin/Hair/Nails:  Skin warm, dry, and intact, no rashes  Neurologic:  Alert and oriented x3, no cranial nerve deficits, normal strength and tone, gait steady  Extremities:  No edema.  Rosa's sign negative.    Genitourinary: Recently had a prostate biopsy  Pulses:  Equal bilaterally    Recent Labs   Lab Test 03/08/21  1104 11/18/19  1053 08/05/19  1051   HGB 14.8 14.9 13.3*    224 248     --  138   K 4.5 4.0 4.7   CREATININE 1.50*  --  1.12        PRE-OP Diagnostics:   Labs pending at this time. Results will be reviewed when available.  No EKG required for low risk surgery (cataract, skin procedure,  breast biopsy, etc).    REVISED CARDIAC RISK INDEX (RCRI)   The patient has the following serious cardiovascular risks for perioperative complications:   - No serious cardiac risks = 0 points    RCRI INTERPRETATION: 0 points: Class I (very low risk - 0.4% complication rate)         Signed Electronically by: Zac Rushing MD    Copy of this evaluation report is provided to requesting physician.

## 2021-06-17 NOTE — ANESTHESIA POSTPROCEDURE EVALUATION
Patient: Mayur LE TAYE Patel  Procedure(s):  CYSTOURETHROSCOPY, WITH INSERTION OF PERMANENT ADJUSTABLE TRANSPROSTATIC IMPLANT, SINGLE IMPLANT (Bilateral)  Anesthesia type: general    Patient location: Phase II Recovery  Last vitals:   Vitals Value Taken Time   /92 04/26/21 0901   Temp 36.3  C (97.3  F) 04/26/21 0824   Pulse 61 04/26/21 0910   Resp 16 04/26/21 0824   SpO2 95 % 04/26/21 0910   Vitals shown include unvalidated device data.  Post vital signs: stable  Level of consciousness: awake, alert and oriented  Post-anesthesia pain: pain controlled  Post-anesthesia nausea and vomiting: no  Pulmonary: unassisted, return to baseline  Cardiovascular: stable, blood pressure at baseline and hypertensive  Hydration: adequate  Anesthetic events: no    QCDR Measures:  ASA# 11 - [place]ruz-op Cardiac Arrest: ASA11B - Patient did NOT experience unanticipated cardiac arrest  ASA# 12 - Marycruz-op Mortality Rate: ASA12B - Patient did NOT die  ASA# 13 - PACU Re-Intubation Rate: ASA13B - Patient did NOT require a new airway mgmt  ASA# 10 - Composite Anes Safety: ASA10A - No serious adverse event    Additional Notes:

## 2021-06-17 NOTE — TELEPHONE ENCOUNTER
Telephone Encounter by Osito Dennison at 5/18/2020 12:56 PM     Author: Osito Dennison Service: -- Author Type: --    Filed: 5/18/2020  1:02 PM Encounter Date: 5/15/2020 Status: Signed    : Osito Dennison       PRIOR AUTHORIZATION DENIED    Denial Rational: This medication is not covered under Part D coverage. We do not offer supplemental benefit cover this drug.        Appeal Information: If the provider would like to appeal this denial, please provide a letter of medical necessity and once it has been completed and placed in the patient's chart, notify the [place] ( [place] [phone]) and the appeal can be initiated on behalf of the patient and provider.  Please also include any therapies that the patient has tried and their outcomes.

## 2021-06-19 NOTE — PROGRESS NOTES
Assessment:     1. TRENT (obstructive sleep apnea)  modafinil (PROVIGIL) 200 MG tablet   2. Benign essential hypertension  propranolol (INDERAL) 40 MG tablet    irbesartan-hydrochlorothiazide (AVALIDE) 300-12.5 mg per tablet   3. Essential hypertension  modafinil (PROVIGIL) 200 MG tablet    verapamil (VERELAN PM) 240 MG 24 hr capsule       Plan:     1. Benign essential hypertension  Patient will continue on the following medications no change in plans.  Patient was instructed to watch his dietary intake of salt  - propranolol (INDERAL) 40 MG tablet; Take 1 tablet (40 mg total) by mouth daily.  Dispense: 90 tablet; Refill: 3  - irbesartan-hydrochlorothiazide (AVALIDE) 300-12.5 mg per tablet; Take 1 tablet by mouth daily.  Dispense: 90 tablet; Refill: 3    2. Essential hypertension  Normotensive at this examination  - modafinil (PROVIGIL) 200 MG tablet; Take 1 tablet (200 mg total) by mouth daily.  Dispense: 90 tablet; Refill: 3  - verapamil (VERELAN PM) 240 MG 24 hr capsule; Take 1 capsule (240 mg total) by mouth daily.  Dispense: 90 capsule; Refill: 3    3. TRENT (obstructive sleep apnea)  Patient's TRENT causes daytime drowsiness and is taking more than a fill according to instructions.  Risk-benefit ratio discussed again with patient continue same therapy  - modafinil (PROVIGIL) 200 MG tablet; Take 1 tablet (200 mg total) by mouth daily.  Dispense: 90 tablet; Refill: 3    4.  Gait disturbance (ataxia)  Subjective:   I am seeing this patient today because of a gait disturbance.  Patient is dragging his right foot intermittently but not falling no dizziness no symptoms of inner ear problems no headaches.  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  Since that time blood pressure is been under good control.  Patient is concerned he may have amyotrophic lateral sclerosis.  We reassured the patient there are no findings he has no fasciculations he has no central nerve deficits  "his gait he is not falling he is able to play golf strength is not a complaint.  I have reviewed the patient's medications including his blood pressure medications and will not change those.  I did spend at least 50% of visit reassuring the patient with results of his neurological examination today and all medical questions that were asked were answered.  I will see the patient for follow-up for his complete physical in the fall.  No other problem to review was his obstructive sleep apnea which causes snoring and daytime sedation.  Patient is on will have a fill and taken according to instructions pros and cons of therapy discussed risk-benefit ratio discussed.  I will see the patient for follow-up 6 months at this time.  Patient will call us if he has any further problems with his ataxia.    Review of Systems: A complete 14 point review of systems was obtained and is negative or as stated in the history of present illness.    No past medical history on file.  No family history on file.  No past surgical history on file.  Social History   Substance Use Topics     Smoking status: Never Smoker     Smokeless tobacco: Never Used     Alcohol use Yes         Objective:   /86 (Patient Site: Right Arm, Patient Position: Sitting, Cuff Size: Adult Regular)  Pulse (!) 52  Resp 18  Ht 5' 8.5\" (1.74 m)  Wt 178 lb 3.2 oz (80.8 kg)  BMI 26.7 kg/m2    General Appearance:  Alert, cooperative, no distress  Head:  Normocephalic, no obvious abnormality  Ears: TM anatomy normal  Eyes:  PERRL, EOM's intact, conjunctiva and corneas clear  Nose:  Nares symmetrical, septum midline, mucosa pink, no sinus tenderness  Throat:  Lips, tongue, and mucosa are moist, pink, and intact  Neck:  Supple, symmetrical, trachea midline, no adenopathy; thyroid: no enlargement, symmetric,no tenderness/mass/nodules; no carotid bruit, no JVD  Back:  Symmetrical, no curvature, ROM normal, no CVA tenderness  Chest/Breast:  No mass or " tenderness  Lungs:  Clear to auscultation bilaterally, respirations unlabored   Heart:  Normal PMI, regular rate & rhythm, S1 and S2 normal, no murmurs, rubs, or gallops  Abdomen:  Soft, non-tender, bowel sounds active all four quadrants, no mass, or organomegaly  Musculoskeletal:  Tone and strength strong and symmetrical, all extremities  Lymphatic:  No adenopathy  Skin/Hair/Nails:  Skin warm, dry, and intact, no rashes  Neurologic:  Alert and oriented x3, no cranial nerve deficits, normal strength and tone, gait steady  Extremities:  No edema.  Rosa's sign negative.    Genitourinary: deferred  Pulses:  Equal bilaterally     I have had an Advance Directives discussion with the patient.      This note has been dictated using voice recognition software. Any grammatical or context distortions are unintentional and inherent to the the software.

## 2021-06-19 NOTE — LETTER
Letter by Zac Rushing MD at      Author: Zac Rushing MD Service: -- Author Type: --    Filed:  Encounter Date: 8/7/2019 Status: (Other)         Mayur Patel, DDS  [address] [facility] [address]             August 7, 2019         Dear Mr. Patel,    Below are the results from your recent visit:    Resulted Orders   Comprehensive Metabolic Panel   Result Value Ref Range    Sodium 138 136 - 145 mmol/L    Potassium 4.7 3.5 - 5.0 mmol/L    Chloride 105 98 - 107 mmol/L    CO2 27 22 - 31 mmol/L    Anion Gap, Calculation 6 5 - 18 mmol/L    Glucose 89 70 - 125 mg/dL    BUN 20 8 - 28 mg/dL    Creatinine 1.12 0.70 - 1.30 mg/dL    GFR MDRD Af Amer >60 >60 mL/min/1.73m2    GFR MDRD Non Af Amer >60 >60 mL/min/1.73m2    Bilirubin, Total 0.6 0.0 - 1.0 mg/dL    Calcium 10.4 8.5 - 10.5 mg/dL    Protein, Total 6.8 6.0 - 8.0 g/dL    Albumin 4.0 3.5 - 5.0 g/dL    Alkaline Phosphatase 43 (L) 45 - 120 U/L    AST 19 0 - 40 U/L    ALT 14 0 - 45 U/L    Narrative    Fasting Glucose reference range is 70-99 mg/dL per  American Diabetes Association (ADA) guidelines.   Magnesium   Result Value Ref Range    Magnesium 2.0 1.8 - 2.6 mg/dL   HM2(CBC w/o Differential)   Result Value Ref Range    WBC 4.4 4.0 - 11.0 thou/uL    RBC 3.82 (L) 4.40 - 6.20 mill/uL    Hemoglobin 13.3 (L) 14.0 - 18.0 g/dL    Hematocrit 39.5 (L) 40.0 - 54.0 %     (H) 80 - 100 fL    MCH 34.7 (H) 27.0 - 34.0 pg    MCHC 33.6 32.0 - 36.0 g/dL    RDW 11.9 11.0 - 14.5 %    Platelets 248 140 - 440 thou/uL    MPV 6.4 (L) 7.0 - 10.0 fL   PSA, Annual Screen (Prostatic-Specific Antigen)   Result Value Ref Range    PSA 2.3 0.0 - 6.5 ng/mL    Narrative    Method is Abbott Prostate-Specific Antigen (PSA)  Standard-WHO 1st International (90:10)       All of your tests are excellent.  Continue with your healthy lifestyle.  I will see you in 6 months.    Please call with questions or contact us using threadsyt.    Sincerely,        Electronically signed by Zac FLORES  MD Сергей

## 2021-07-03 NOTE — ANESTHESIA PREPROCEDURE EVALUATION
Anesthesia Preprocedure Evaluation by Sis Quiroz MD at 4/26/2021  6:50 AM     Author: Sis Quiroz MD Service: -- Author Type: Physician    Filed: 4/26/2021  6:58 AM Date of Service: 4/26/2021  6:50 AM Status: Signed    : Sis Quiroz MD (Physician)       Anesthesia Evaluation      Patient summary reviewed   No history of anesthetic complications     Airway   Mallampati: II  Neck ROM: full   Pulmonary - normal exam   (+) a smoker (Former)                         Cardiovascular - normal exam  Exercise tolerance: > or = 4 METS  (+) hypertension, ,     ECG reviewed        Neuro/Psych    (+) CVA (No residual symptoms) , chronic pain (h/o opioid dependence)    Endo/Other    (+) hypothyroidism,      GI/Hepatic/Renal    (-) GERD    Comments: BPH          Dental                             Anesthesia Plan  Planned anesthetic: general LMA and total IV anesthesia  Pt would like to be well sedated while in the OR.    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  RN will use lidocaine.  ASA 3   Induction: intravenous   Anesthetic plan and risks discussed with: patient  Anesthesia plan special considerations: antiemetics,   Post-op plan: routine recovery

## 2021-07-03 NOTE — ADDENDUM NOTE
Addendum Note by Brandon Rushing MD at 1/10/2018  8:14 AM     Author: Brandon Rushing MD Service: -- Author Type: Physician    Filed: 1/10/2018  8:14 AM Encounter Date: 1/10/2018 Status: Signed    : Brandon Rushing MD (Physician)    Addended by: BRANDON RUSHING on: 1/10/2018 08:14 AM        Modules accepted: Orders

## 2021-07-03 NOTE — ADDENDUM NOTE
Addendum Note by Tejinder Reyes LPN at 1/10/2018  8:22 AM     Author: Tejinder Reyes LPN Service: -- Author Type: Licensed Nurse    Filed: 1/10/2018  8:22 AM Encounter Date: 1/10/2018 Status: Signed    : Tejinder Reyes LPN (Licensed Nurse)    Addended by: TEJINDER REYES on: 1/10/2018 08:22 AM        Modules accepted: Orders

## 2021-07-03 NOTE — ADDENDUM NOTE
Addendum Note by Brandon Rushing MD at 8/5/2019 10:20 AM     Author: Brandon Rushing MD Service: -- Author Type: Physician    Filed: 8/5/2019 11:42 AM Encounter Date: 8/5/2019 Status: Signed    : Brandon Rushing MD (Physician)    Addended by: BRANDON RUSHING on: 8/5/2019 11:42 AM        Modules accepted: Orders

## 2021-07-07 ENCOUNTER — RECORDS - HEALTHEAST (OUTPATIENT)
Dept: ADMINISTRATIVE | Facility: OTHER | Age: 81
End: 2021-07-07

## 2021-07-10 ENCOUNTER — HEALTH MAINTENANCE LETTER (OUTPATIENT)
Age: 81
End: 2021-07-10

## 2021-09-01 ENCOUNTER — TELEPHONE (OUTPATIENT)
Dept: FAMILY MEDICINE | Facility: CLINIC | Age: 81
End: 2021-09-01

## 2021-09-01 NOTE — TELEPHONE ENCOUNTER
Pt states he received a bill via email but there is no documentation of what the charges were for.    Pt would like the bill explained.    Please advise.

## 2021-09-04 ENCOUNTER — HEALTH MAINTENANCE LETTER (OUTPATIENT)
Age: 81
End: 2021-09-04

## 2021-09-08 ENCOUNTER — TRANSFERRED RECORDS (OUTPATIENT)
Dept: HEALTH INFORMATION MANAGEMENT | Facility: CLINIC | Age: 81
End: 2021-09-08

## 2021-09-15 ENCOUNTER — OFFICE VISIT (OUTPATIENT)
Dept: FAMILY MEDICINE | Facility: CLINIC | Age: 81
End: 2021-09-15
Payer: COMMERCIAL

## 2021-09-15 VITALS
WEIGHT: 155.4 LBS | SYSTOLIC BLOOD PRESSURE: 142 MMHG | BODY MASS INDEX: 24.39 KG/M2 | DIASTOLIC BLOOD PRESSURE: 90 MMHG | HEART RATE: 60 BPM | OXYGEN SATURATION: 97 % | HEIGHT: 67 IN

## 2021-09-15 DIAGNOSIS — N39.43 BENIGN PROSTATIC HYPERPLASIA WITH POST-VOID DRIBBLING: ICD-10-CM

## 2021-09-15 DIAGNOSIS — Z00.00 ENCOUNTER FOR ANNUAL WELLNESS EXAM IN MEDICARE PATIENT: ICD-10-CM

## 2021-09-15 DIAGNOSIS — N40.1 BENIGN PROSTATIC HYPERPLASIA WITH POST-VOID DRIBBLING: ICD-10-CM

## 2021-09-15 LAB
ALBUMIN SERPL-MCNC: 4.4 G/DL (ref 3.5–5)
ALBUMIN UR-MCNC: 30 MG/DL
ALP SERPL-CCNC: 44 U/L (ref 45–120)
ALT SERPL W P-5'-P-CCNC: 15 U/L (ref 0–45)
ANION GAP SERPL CALCULATED.3IONS-SCNC: 14 MMOL/L (ref 5–18)
APPEARANCE UR: CLEAR
AST SERPL W P-5'-P-CCNC: 25 U/L (ref 0–40)
BACTERIA #/AREA URNS HPF: ABNORMAL /HPF
BILIRUB SERPL-MCNC: 0.8 MG/DL (ref 0–1)
BILIRUB UR QL STRIP: ABNORMAL
BUN SERPL-MCNC: 19 MG/DL (ref 8–28)
CALCIUM SERPL-MCNC: 9.9 MG/DL (ref 8.5–10.5)
CHLORIDE BLD-SCNC: 101 MMOL/L (ref 98–107)
CO2 SERPL-SCNC: 21 MMOL/L (ref 22–31)
COLOR UR AUTO: YELLOW
CREAT SERPL-MCNC: 1.02 MG/DL (ref 0.7–1.3)
ERYTHROCYTE [DISTWIDTH] IN BLOOD BY AUTOMATED COUNT: 11.6 % (ref 10–15)
GFR SERPL CREATININE-BSD FRML MDRD: 70 ML/MIN/1.73M2
GLUCOSE BLD-MCNC: 99 MG/DL (ref 70–125)
GLUCOSE UR STRIP-MCNC: NEGATIVE MG/DL
HCT VFR BLD AUTO: 37.8 % (ref 40–53)
HGB BLD-MCNC: 13.4 G/DL (ref 13.3–17.7)
HGB UR QL STRIP: NEGATIVE
KETONES UR STRIP-MCNC: 15 MG/DL
LEUKOCYTE ESTERASE UR QL STRIP: NEGATIVE
MCH RBC QN AUTO: 36.9 PG (ref 26.5–33)
MCHC RBC AUTO-ENTMCNC: 35.4 G/DL (ref 31.5–36.5)
MCV RBC AUTO: 104 FL (ref 78–100)
NITRATE UR QL: NEGATIVE
PH UR STRIP: 6 [PH] (ref 5–8)
PLATELET # BLD AUTO: 168 10E3/UL (ref 150–450)
POTASSIUM BLD-SCNC: 4 MMOL/L (ref 3.5–5)
PROT SERPL-MCNC: 7.1 G/DL (ref 6–8)
RBC # BLD AUTO: 3.63 10E6/UL (ref 4.4–5.9)
RBC #/AREA URNS AUTO: ABNORMAL /HPF
SODIUM SERPL-SCNC: 136 MMOL/L (ref 136–145)
SP GR UR STRIP: >=1.03 (ref 1–1.03)
SQUAMOUS #/AREA URNS AUTO: ABNORMAL /LPF
UROBILINOGEN UR STRIP-ACNC: 0.2 E.U./DL
WBC # BLD AUTO: 3.7 10E3/UL (ref 4–11)
WBC #/AREA URNS AUTO: ABNORMAL /HPF

## 2021-09-15 PROCEDURE — 99397 PER PM REEVAL EST PAT 65+ YR: CPT | Performed by: FAMILY MEDICINE

## 2021-09-15 PROCEDURE — 36415 COLL VENOUS BLD VENIPUNCTURE: CPT | Performed by: FAMILY MEDICINE

## 2021-09-15 PROCEDURE — 85027 COMPLETE CBC AUTOMATED: CPT | Performed by: FAMILY MEDICINE

## 2021-09-15 PROCEDURE — 81001 URINALYSIS AUTO W/SCOPE: CPT | Performed by: FAMILY MEDICINE

## 2021-09-15 PROCEDURE — 80053 COMPREHEN METABOLIC PANEL: CPT | Performed by: FAMILY MEDICINE

## 2021-09-15 PROCEDURE — 99213 OFFICE O/P EST LOW 20 MIN: CPT | Mod: 25 | Performed by: FAMILY MEDICINE

## 2021-09-15 RX ORDER — TAMSULOSIN HYDROCHLORIDE 0.4 MG/1
0.4 CAPSULE ORAL 2 TIMES DAILY
Qty: 180 CAPSULE | Refills: 1 | Status: SHIPPED | OUTPATIENT
Start: 2021-09-15 | End: 2021-12-14

## 2021-09-15 ASSESSMENT — MIFFLIN-ST. JEOR: SCORE: 1379.26

## 2021-09-15 ASSESSMENT — ACTIVITIES OF DAILY LIVING (ADL): CURRENT_FUNCTION: NO ASSISTANCE NEEDED

## 2021-09-15 NOTE — PROGRESS NOTES
SUBJECTIVE:   Mayur Patel is a 80 year old male who presents for Preventive Visit.  Patient presents for annual physical examination today Medicare screening.  He is under the care of pain control for chronic low back discomfort having had multiple microsurgeries for intractable low back pain.  Medications were reviewed today he does take vitamin pills does not take anything for his high blood pressure he does have prostatism and is under the care of urology recent had a UroLift procedure which did not help much with his nocturia.  Plays golf once weekly.  He is moved in the last year now lives in an apartment falls risk were discussed does not smoke drinks socially weight is under good control plan is to do basic metabolic profile hemogram we will leave prostate exam and neurological exam up to urological provider.  Was a pleasure to see him again he is really doing well    Patient has been advised of split billing requirements and indicates understanding reports a good year of health he has had Yes   Are you in the first 12 months of your Medicare coverage?  No    HPI patient presents for his annual Medicare screening; medications were reviewed today he continues to take  Do you feel safe in your environment? YES    Have you ever done Advance Care Planning? (For example, a Health Directive, POLST, or a discussion with a medical provider or your loved ones about your wishes): Yes, advance care planning is on file.           Cognitive Screening   1) Repeat 3 items (Leader, Season, Table)    2) Clock draw: NORMAL  3) 3 item recall: ecalls 3 objects  Results: NORMAL clock, 1-2 items recalled: COGNITIVE IMPAIRMENT LESS LIKELY    Mini-CogTM Copyright TOR[employer]. Licensed by the author for use in [facility]; reprinted with permission (duong@.[facility]). All rights reserved.      Do you have sleep apnea, excessive snoring or daytime drowsiness?: no    Reviewed and updated as needed this visit by clinical  "staff   Allergies  Meds              Reviewed and updated as needed this visit by Provider                Social History     Tobacco Use     Smoking status: Former Smoker     Types: Cigarettes     Smokeless tobacco: Never Used   Substance Use Topics     Alcohol use: Yes     Comment: Alcoholic Drinks/day: Social     If you drink alcohol do you typically have >3 drinks per day or >7 drinks per week? No    Alcohol Use 9/15/2021   Prescreen: >3 drinks/day or >7 drinks/week? No               Current providers sharing in care for this patient include:   Patient Care Team:  Zac Rushing MD as PCP - General (Internal Medicine)  Florecita Raines, PharmD as Pharmacist (Pharmacist)  Zac Rushing MD as Assigned PCP  Blanca Jackman MD as Assigned Surgical Provider    The following health maintenance items are reviewed in Epic and correct as of today:  Health Maintenance Due   Topic Date Due     ANNUAL REVIEW OF  ORDERS  Never done     DEPRESSION ACTION PLAN  Never done     HEPATITIS C SCREENING  Never done     HEPATITIS B IMMUNIZATION (1 of 3 - Risk 3-dose series) Never done     ZOSTER IMMUNIZATION (1 of 2) Never done     MEDICARE ANNUAL WELLNESS VISIT  01/10/2019     INFLUENZA VACCINE (1) 09/01/2021     Lab work is in process          Review of Systems  Constitutional, HEENT, cardiovascular, pulmonary, GI, , musculoskeletal, neuro, skin, endocrine and psych systems are negative, except as otherwise noted.    OBJECTIVE:   There were no vitals taken for this visit. Estimated body mass index is 23.92 kg/m  as calculated from the following:    Height as of 4/26/21: 1.753 m (5' 9\").    Weight as of 4/26/21: 73.5 kg (162 lb).  Physical Exam  GENERAL: healthy, alert and no distress  EYES: Eyes grossly normal to inspection, PERRL and conjunctivae and sclerae normal  HENT: ear canals and TM's normal, nose and mouth without ulcers or lesions  NECK: no adenopathy, no asymmetry, masses, or scars and thyroid " "normal to palpation  RESP: lungs clear to auscultation - no rales, rhonchi or wheezes  CV: regular rate and rhythm, normal S1 S2, no S3 or S4, no murmur, click or rub, no peripheral edema and peripheral pulses strong  ABDOMEN: soft, nontender, no hepatosplenomegaly, no masses and bowel sounds normal  MS: no gross musculoskeletal defects noted, no edema  SKIN: no suspicious lesions or rashes  NEURO: Normal strength and tone, mentation intact and speech normal  PSYCH: mentation appears normal, affect normal/bright        ASSESSMENT / PLAN:       ICD-10-CM    1. Benign prostatic hyperplasia with post-void dribbling  N40.1 tamsulosin (FLOMAX) 0.4 MG capsule    N39.43 CBC with platelets     Comprehensive metabolic panel     UA reflex to Microscopic and Culture   2. Encounter for annual wellness exam in Medicare patient  Z00.00 CBC with platelets     Comprehensive metabolic panel     UA reflex to Microscopic and Culture       Patient has been advised of split billing requirements and indicates understanding: Yes  COUNSELING:      Estimated body mass index is 23.92 kg/m  as calculated from the following:    Height as of 4/26/21: 1.753 m (5' 9\").    Weight as of 4/26/21: 73.5 kg (162 lb).        He reports that he has quit smoking. His smoking use included cigarettes. He has never used smokeless tobacco.      Appropriate preventive services were discussed with this patient, including applicable screening as appropriate for cardiovascular disease, diabetes, osteopenia/osteoporosis, and glaucoma.  As appropriate for age/gender, discussed screening for colorectal cancer, prostate cancer, breast cancer, and cervical cancer. Checklist reviewing preventive services available has been given to the patient.    Reviewed patients plan of care and provided an AVS. The  for Mayur meets the Care Plan requirement. This Care Plan has been established and reviewed with the Patient.    Counseling Resources:  ATP IV Guidelines  Pooled " Cohorts Equation Calculator  Breast Cancer Risk Calculator  Breast Cancer: Medication to Reduce Risk  FRAX Risk Assessment  ICSI Preventive Guidelines  Dietary Guidelines for Americans, 2010  USDA's MyPlate  ASA Prophylaxis  Lung CA Screening    Zac Rushing MD  [facility]    Identified Health Risks:

## 2022-03-09 ENCOUNTER — OFFICE VISIT (OUTPATIENT)
Dept: FAMILY MEDICINE | Facility: CLINIC | Age: 82
End: 2022-03-09
Payer: MEDICARE

## 2022-03-09 VITALS
DIASTOLIC BLOOD PRESSURE: 62 MMHG | BODY MASS INDEX: 24.68 KG/M2 | SYSTOLIC BLOOD PRESSURE: 132 MMHG | HEART RATE: 88 BPM | WEIGHT: 157.8 LBS | OXYGEN SATURATION: 95 %

## 2022-03-09 DIAGNOSIS — H02.106 ECTROPION DUE TO LAXITY OF EYELID, LEFT: Primary | ICD-10-CM

## 2022-03-09 DIAGNOSIS — F11.29 OPIOID DEPENDENCE WITH OPIOID-INDUCED DISORDER (H): ICD-10-CM

## 2022-03-09 DIAGNOSIS — F32.1 CURRENT MODERATE EPISODE OF MAJOR DEPRESSIVE DISORDER WITHOUT PRIOR EPISODE (H): ICD-10-CM

## 2022-03-09 DIAGNOSIS — Z01.818 PREOP GENERAL PHYSICAL EXAM: ICD-10-CM

## 2022-03-09 DIAGNOSIS — N18.4 CKD (CHRONIC KIDNEY DISEASE) STAGE 4, GFR 15-29 ML/MIN (H): ICD-10-CM

## 2022-03-09 LAB
BASOPHILS # BLD AUTO: 0 10E3/UL (ref 0–0.2)
BASOPHILS NFR BLD AUTO: 0 %
EOSINOPHIL # BLD AUTO: 0.1 10E3/UL (ref 0–0.7)
EOSINOPHIL NFR BLD AUTO: 2 %
ERYTHROCYTE [DISTWIDTH] IN BLOOD BY AUTOMATED COUNT: 12 % (ref 10–15)
HCT VFR BLD AUTO: 41.7 % (ref 40–53)
HGB BLD-MCNC: 14 G/DL (ref 13.3–17.7)
IMM GRANULOCYTES # BLD: 0 10E3/UL
IMM GRANULOCYTES NFR BLD: 0 %
LYMPHOCYTES # BLD AUTO: 0.6 10E3/UL (ref 0.8–5.3)
LYMPHOCYTES NFR BLD AUTO: 16 %
MCH RBC QN AUTO: 36.5 PG (ref 26.5–33)
MCHC RBC AUTO-ENTMCNC: 33.6 G/DL (ref 31.5–36.5)
MCV RBC AUTO: 109 FL (ref 78–100)
MONOCYTES # BLD AUTO: 0.4 10E3/UL (ref 0–1.3)
MONOCYTES NFR BLD AUTO: 11 %
NEUTROPHILS # BLD AUTO: 2.6 10E3/UL (ref 1.6–8.3)
NEUTROPHILS NFR BLD AUTO: 71 %
PLATELET # BLD AUTO: 130 10E3/UL (ref 150–450)
POTASSIUM BLD-SCNC: 4.5 MMOL/L (ref 3.5–5)
RBC # BLD AUTO: 3.84 10E6/UL (ref 4.4–5.9)
WBC # BLD AUTO: 3.6 10E3/UL (ref 4–11)

## 2022-03-09 PROCEDURE — 36415 COLL VENOUS BLD VENIPUNCTURE: CPT | Performed by: FAMILY MEDICINE

## 2022-03-09 PROCEDURE — 99214 OFFICE O/P EST MOD 30 MIN: CPT | Performed by: FAMILY MEDICINE

## 2022-03-09 PROCEDURE — 84132 ASSAY OF SERUM POTASSIUM: CPT | Performed by: FAMILY MEDICINE

## 2022-03-09 PROCEDURE — 85025 COMPLETE CBC W/AUTO DIFF WBC: CPT | Performed by: FAMILY MEDICINE

## 2022-03-09 RX ORDER — MODAFINIL 200 MG/1
200 TABLET ORAL
COMMUNITY
End: 2022-10-13

## 2022-03-09 RX ORDER — TAMSULOSIN HYDROCHLORIDE 0.4 MG/1
CAPSULE ORAL
COMMUNITY
Start: 2021-01-26 | End: 2022-05-04

## 2022-03-09 RX ORDER — TRAZODONE HYDROCHLORIDE 100 MG/1
TABLET ORAL
COMMUNITY
Start: 2021-03-23 | End: 2022-10-13

## 2022-03-09 RX ORDER — TRAMADOL HYDROCHLORIDE 100 MG/1
TABLET, EXTENDED RELEASE ORAL
COMMUNITY
End: 2022-10-13

## 2022-03-09 RX ORDER — TIZANIDINE 2 MG/1
2 TABLET ORAL
COMMUNITY
End: 2022-10-13

## 2022-03-09 RX ORDER — ONDANSETRON 4 MG/1
TABLET, ORALLY DISINTEGRATING ORAL
COMMUNITY
End: 2022-10-13

## 2022-03-09 NOTE — PROGRESS NOTES
[facility]  [address]  [facility] [address]  Phone: [phone]  Fax: [phone]  Primary Provider: Brandon Rushing  Pre-op Performing Provider: BRANDON RUSHING      PREOPERATIVE EVALUATION:  Today's date: 3/9/2022    Mayur Patel is a 81 year old male who presents for a preoperative evaluation.    Surgical Information:  Surgery/Procedure lateral left lower lid laxity  Surgery Location: [facility] [place]  Surgeon: Kira Jo  Surgery Date: 3/11/22  Time of Surgery: 3:00pm  Where patient plans to recover: At home with family  Fax number for surgical facility: Note does not need to be faxed, will be available electronically in Epic.    Type of Anesthesia Anticipated: Choice    Assessment & Plan     The proposed surgical procedure is considered LOW risk.    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  Previously he has had other eye surgeries without any difficulty.  Past medical history is well-known system review unremarkable patient is therapeutically optimized for the anticipated procedure all medical questions asked were answered    Preop Questions 3/9/2022   1. Have you ever had a heart attack or  stroke? YES -    2. Have you ever had surgery on your heart or blood vessels, such as a stent placement, a coronary artery bypass, or surgery on an artery in your head, neck, heart, or legs? No   3. Do you have chest pain with activity? No   4. Do you have a history of  heart failure? No   5. Do you currently have a cold, bronchitis or symptoms of other infection? No   6. Do you have a cough, shortness of breath, or wheezing? No   7. Do you or anyone in your family have previous history of blood clots? No   8. Do you or does anyone in your family have a serious bleeding problem such as prolonged bleeding following surgeries or cuts? No   9. Have you ever had problems with anemia or been told to take iron pills? No   10. Have you had any abnormal blood loss such as black, tarry or bloody stools? No   11. Have you ever had a blood transfusion? No   12. Are you willing to have a blood transfusion if it is medically needed before, during, or after your surgery? NO -    13. Have you or any of your relatives ever had problems with anesthesia? No   14. Do you have sleep apnea, excessive snoring or daytime drowsiness? No   15. Do you have any artifical heart valves or other implanted medical devices like a pacemaker, defibrillator, or continuous glucose monitor? No   16. Do you have artificial joints? No   17. Are you allergic to latex? No       Health Care Directive:  Patient does not have a Health Care Directive or Living Will: Discussed advance care planning with patient; however, patient declined at this time.    Preoperative Review of :   reviewed - no record of controlled substances prescribed.          Review of Systems  CONSTITUTIONAL: NEGATIVE for fever, chills, change in weight  ENT/MOUTH: NEGATIVE for ear, mouth and throat problems  RESP: NEGATIVE for significant cough or SOB  CV: NEGATIVE for chest pain, palpitations or peripheral edema    Patient Active Problem List    Diagnosis Date Noted     Nasal mass  01/22/2014     Priority: Medium      Past Medical History:   Diagnosis Date     Back pain      BPH (benign prostatic hyperplasia)      Disease of thyroid gland      HTN (hypertension)      Hypertension      Sinusitis      Stroke (H)      Unspecified cerebral artery occlusion with cerebral infarction      Past Surgical History:   Procedure Laterality Date     BIOPSY PROSTATE NEEDLE / PUNCH / INCISIONAL N/A 3/16/2021    Procedure: TRANSRECTAL ULTRASOUND GUIDED PROSTATE BIOPSY;  Surgeon: Frandy Hathaway MD;  Location: [facility];  Service: Urology     COLONOSCOPY       CYSTOURETHROSCOPY Bilateral 4/26/2021    Procedure: CYSTOURETHROSCOPY, WITH INSERTION OF PERMANENT ADJUSTABLE TRANSPROSTATIC IMPLANT, SINGLE IMPLANT;  Surgeon: Frandy Hathaway MD;  Location: [facility];  Service: Urology     IR CAROTID ANGIOGRAM  6/14/2009     IR CAROTID ANGIOGRAM  6/14/2009     IR MISCELLANEOUS PROCEDURE  6/14/2009     IR MISCELLANEOUS PROCEDURE  6/14/2009     OPTICAL TRACKING SYSTEM ENDOSCOPIC ENDONASAL SURGERY  2/28/2014    Procedure: OPTICAL TRACKING SYSTEM ENDOSCOPIC ENDONASAL SURGERY;  Stealth Guided Bilateral Inferior Turbinate Reduction, Maxillary Antrostomy And Polypectomy, Maxillary Cyst removal ;  Surgeon: Mandy Lauren MD;  Location: [facility] OR     RHYTIDECTOMY (FACELIFT)       Current Outpatient Medications   Medication Sig Dispense Refill     aspirin 325 MG tablet Take 1 tablet by mouth daily (Patient not taking: Reported on 9/15/2021)       B Complex Vitamins (VITAMIN B COMPLEX PO) Take 1 tablet by mouth daily  (Patient not taking: Reported on 9/15/2021)       budesonide (PULMICORT) 0.5 MG/2ML nebulizer solution Irrigate with sinius rinse bottle - 2 mls budesonide BID (Patient not taking: Reported on 9/15/2021) 120 mL 3     cephALEXin (KEFLEX) 500 MG capsule Take 1 capsule (500 mg) by mouth 3 times daily (Patient not taking: Reported on 9/15/2021) 30 capsule 0     Coenzyme Q10 (CO Q 10 PO) Take 100 mg  by mouth daily (Patient not taking: Reported on 9/15/2021)       finasteride (PROPECIA) 1 MG tablet Take 1 mg by mouth daily (Patient not taking: Reported on 9/15/2021)       HYDROcodone-acetaminophen (NORCO) 5-325 MG per tablet Take 1-2 tablets by mouth every 4 hours as needed for other (Moderate to Severe Pain) 30 tablet 0     LACTOBACILLUS PO Take 1 tablet by mouth daily  (Patient not taking: Reported on 9/15/2021)       LITHIUM CARBONATE PO Take 10 mg by mouth (Patient not taking: Reported on 9/15/2021)       lovastatin (MEVACOR) 20 MG tablet Take 20 mg by mouth At Bedtime (Patient not taking: Reported on 9/15/2021)       Magnesium 400 MG CAPS Take by mouth daily  (Patient not taking: Reported on 9/15/2021)       Multiple Vitamins-Minerals (VITAMIN D3 COMPLETE PO) Once every 2 weeks (Patient not taking: Reported on 9/15/2021)       Nutritional Supplements (DHEA PO) Take by mouth daily        Olmesartan Medoxomil-HCTZ (BENICAR HCT PO) Take 1 tablet by mouth daily (Patient not taking: Reported on 9/15/2021)       Omega-3 Fatty Acids (OMEGA 3 PO) Take 2,000 mg by mouth (Patient not taking: Reported on 9/15/2021)       oxymetazoline (AFRIN NASAL SPRAY) 0.05 % nasal spray Spray 3 sprays into both nostrils 3 times daily as needed (nasal bleeding) (Patient not taking: Reported on 9/15/2021) 1 Bottle 0     POTASSIUM CHLORIDE PO Take 750 mg by mouth daily (Patient not taking: Reported on 9/15/2021)       PREGNENOLONE daily  (Patient not taking: Reported on 9/15/2021)       Propranolol HCl (INDERAL PO) Take 80 mg by mouth daily        Resveratrol 250 MG CAPS Take by mouth daily  (Patient not taking: Reported on 9/15/2021)       sodium chloride (OCEAN) 0.65 % nasal spray Spray 2 sprays into both nostrils 4 times daily 2 Bottle 0     tadalafil (CIALIS) 5 MG tablet Take 5 mg by mouth as needed (Patient not taking: Reported on 9/15/2021)       TESTOSTERONE Apply 75 mg topically daily  (Patient not taking: Reported on  9/15/2021)       thyroid (ARMOUR THYROID) 120 MG tablet Take 120 mg by mouth daily (Patient not taking: Reported on 9/15/2021)       verapamil (CALAN-SR) 240 MG tablet Take 1 tablet by mouth daily (Patient not taking: Reported on 9/15/2021)         Allergies   Allergen Reactions     Penicillins Hives, Itching and Rash        Social History     Tobacco Use     Smoking status: Former Smoker     Types: Cigarettes     Smokeless tobacco: Never Used   Substance Use Topics     Alcohol use: Yes     Comment: Alcoholic Drinks/day: Social       History   Drug Use Unknown         Objective     There were no vitals taken for this visit.    Physical Exam  GENERAL APPEARANCE: healthy, alert and no distress  HENT: ear canals and TM's normal and nose and mouth without ulcers or lesions  RESP: lungs clear to auscultation - no rales, rhonchi or wheezes  CV: regular rate and rhythm, normal S1 S2, no S3 or S4 and no murmur, click or rub   ABDOMEN: soft, nontender, no HSM or masses and bowel sounds normal  NEURO: Normal strength and tone, sensory exam grossly normal, mentation intact and speech normal    Recent Labs   Lab Test 09/15/21  1250 04/19/21  1147 03/08/21  1104   HGB 13.4 14.6 14.8    179 214     --  137   POTASSIUM 4.0 4.3 4.5   CR 1.02  --  1.50*        Diagnostics:  Labs pending at this time.  Results will be reviewed when available.   No EKG required for low risk surgery (cataract, skin procedure, breast biopsy, etc).    Revised Cardiac Risk Index (RCRI):  The patient has the following serious cardiovascular risks for perioperative complications:   - No serious cardiac risks = 0 points     RCRI Interpretation: 0 points: Class I (very low risk - 0.4% complication rate)           Signed Electronically by: Zac Rushing MD  Copy of this evaluation report is provided to requesting physician.

## 2022-04-30 DIAGNOSIS — N40.0 BENIGN PROSTATIC HYPERPLASIA, UNSPECIFIED WHETHER LOWER URINARY TRACT SYMPTOMS PRESENT: Primary | ICD-10-CM

## 2022-05-03 NOTE — TELEPHONE ENCOUNTER
"Outpatient Medication Detail     Disp Refills Start End AUGUSTUS   tamsulosin (FLOMAX) 0.4 mg cap 180 capsule 1 1/26/2021  No   Sig: Take one capsule in the morning and one in the evening twelve hours apart   Sent to pharmacy as: tamsulosin 0.4 mg capsule (FLOMAX)   E-Prescribing Status: Receipt confirmed by pharmacy (1/26/2021  1:41 PM CST)       tamsulosin (FLOMAX) 0.4 mg cap [[id number]]    Electronically signed by: Zac Rushing MD on 01/26/21 1341 Status: Active   Ordering user: Zac Rushing MD 01/26/21 1341 Authorized by: Zac Rushing MD   Frequency:  01/26/21 - Until Discontinued   Diagnoses  BPH (benign prostatic hyperplasia) [N40.0]     Routing refill request to provider for review/approval because:  Drug interaction warning    Last office visit provider:  3/9/22     Requested Prescriptions   Pending Prescriptions Disp Refills     tamsulosin (FLOMAX) 0.4 MG capsule [Pharmacy Med Name: Tamsulosin HCl Oral Capsule 0.4 MG] 180 capsule 0     Sig: Take 1 capsule (0.4 mg) by mouth 2 times daily       Alpha Blockers Failed - 5/3/2022  3:17 PM        Failed - Patient does not have Tadalafil, Vardenafil, or Sildenafil on their medication list        Passed - Blood pressure under 140/90 in past 12 months     BP Readings from Last 3 Encounters:   03/09/22 132/62   09/15/21 (!) 142/90   04/19/21 116/82                 Passed - Recent (12 mo) or future (30 days) visit within the authorizing provider's specialty     Patient has had an office visit with the authorizing provider or a provider within the authorizing providers department within the previous 12 mos or has a future within next 30 days. See \"Patient Info\" tab in inbasket, or \"Choose Columns\" in Meds & Orders section of the refill encounter.              Passed - Medication is active on med list        Passed - Patient is 18 years of age or older             Luke Martin RN 05/03/22 3:18 PM    "

## 2022-05-04 RX ORDER — TAMSULOSIN HYDROCHLORIDE 0.4 MG/1
CAPSULE ORAL
Qty: 180 CAPSULE | Refills: 0 | Status: SHIPPED | OUTPATIENT
Start: 2022-05-04

## 2022-05-24 ENCOUNTER — TRANSFERRED RECORDS (OUTPATIENT)
Dept: HEALTH INFORMATION MANAGEMENT | Facility: CLINIC | Age: 82
End: 2022-05-24
Payer: MEDICARE

## 2022-07-11 DIAGNOSIS — F32.1 CURRENT MODERATE EPISODE OF MAJOR DEPRESSIVE DISORDER WITHOUT PRIOR EPISODE (H): Primary | ICD-10-CM

## 2022-07-12 RX ORDER — CITALOPRAM HYDROBROMIDE 20 MG/1
TABLET ORAL
Qty: 90 TABLET | Refills: 0 | Status: SHIPPED | OUTPATIENT
Start: 2022-07-12 | End: 2022-10-13

## 2022-07-12 NOTE — TELEPHONE ENCOUNTER
"Routing refill request to provider for review/approval because:  Drug not active on patient's medication list    Last Written Prescription Date:    Last Fill Quantity: ,  # refills:    Last office visit provider:  3/9/22     Requested Prescriptions   Pending Prescriptions Disp Refills     citalopram (CELEXA) 20 MG tablet [Pharmacy Med Name: Citalopram Hydrobromide Oral Tablet 20 MG] 90 tablet 0     Sig: Take 1 tablet (20 mg total) by mouth daily.       SSRIs Protocol Failed - 7/11/2022  9:10 AM        Failed - Medication is active on med list        Passed - Recent (12 mo) or future (30 days) visit within the authorizing provider's specialty     Patient has had an office visit with the authorizing provider or a provider within the authorizing providers department within the previous 12 mos or has a future within next 30 days. See \"Patient Info\" tab in inbasket, or \"Choose Columns\" in Meds & Orders section of the refill encounter.              Passed - Patient is age 18 or older             Serena Hinojosa RN 07/11/22 7:26 PM  "

## 2022-07-28 RX ORDER — VERAPAMIL HYDROCHLORIDE 240 MG/1
CAPSULE, EXTENDED RELEASE ORAL
Qty: 90 CAPSULE | Refills: 0 | OUTPATIENT
Start: 2022-07-28

## 2022-07-29 NOTE — TELEPHONE ENCOUNTER
verapamil (CALAN-SR) 240 MG tablet (Discontinued)    3/9/2022 --   Sig - Route: Take 1 tablet by mouth daily - Oral   Patient not taking: Reported on 9/15/2021        Class: Historical   Reason for Discontinue: Stopped by Patient   Order: [id number]

## 2022-08-23 ENCOUNTER — OFFICE VISIT (OUTPATIENT)
Dept: FAMILY MEDICINE | Facility: CLINIC | Age: 82
End: 2022-08-23
Payer: MEDICARE

## 2022-08-23 VITALS
TEMPERATURE: 98 F | DIASTOLIC BLOOD PRESSURE: 82 MMHG | HEART RATE: 86 BPM | OXYGEN SATURATION: 95 % | WEIGHT: 162.2 LBS | SYSTOLIC BLOOD PRESSURE: 138 MMHG | RESPIRATION RATE: 16 BRPM | BODY MASS INDEX: 25.46 KG/M2 | HEIGHT: 67 IN

## 2022-08-23 DIAGNOSIS — I10 BENIGN ESSENTIAL HYPERTENSION: ICD-10-CM

## 2022-08-23 DIAGNOSIS — Z01.818 PREOP GENERAL PHYSICAL EXAM: ICD-10-CM

## 2022-08-23 DIAGNOSIS — G89.4 PAIN SYNDROME, CHRONIC: Primary | ICD-10-CM

## 2022-08-23 PROBLEM — N18.4 CKD (CHRONIC KIDNEY DISEASE) STAGE 4, GFR 15-29 ML/MIN (H): Status: RESOLVED | Noted: 2022-03-09 | Resolved: 2022-08-23

## 2022-08-23 LAB
ANION GAP SERPL CALCULATED.3IONS-SCNC: 10 MMOL/L (ref 7–15)
BUN SERPL-MCNC: 12.3 MG/DL (ref 8–23)
CALCIUM SERPL-MCNC: 9.1 MG/DL (ref 8.8–10.2)
CHLORIDE SERPL-SCNC: 101 MMOL/L (ref 98–107)
CREAT SERPL-MCNC: 1.09 MG/DL (ref 0.67–1.17)
DEPRECATED HCO3 PLAS-SCNC: 26 MMOL/L (ref 22–29)
ERYTHROCYTE [DISTWIDTH] IN BLOOD BY AUTOMATED COUNT: 13.8 % (ref 10–15)
GFR SERPL CREATININE-BSD FRML MDRD: 69 ML/MIN/1.73M2
GLUCOSE SERPL-MCNC: 83 MG/DL (ref 70–99)
HCT VFR BLD AUTO: 36.4 % (ref 40–53)
HGB BLD-MCNC: 12.2 G/DL (ref 13.3–17.7)
MCH RBC QN AUTO: 36.7 PG (ref 26.5–33)
MCHC RBC AUTO-ENTMCNC: 33.5 G/DL (ref 31.5–36.5)
MCV RBC AUTO: 110 FL (ref 78–100)
PLATELET # BLD AUTO: 188 10E3/UL (ref 150–450)
POTASSIUM SERPL-SCNC: 4.8 MMOL/L (ref 3.4–5.3)
RBC # BLD AUTO: 3.32 10E6/UL (ref 4.4–5.9)
SODIUM SERPL-SCNC: 137 MMOL/L (ref 136–145)
WBC # BLD AUTO: 5.4 10E3/UL (ref 4–11)

## 2022-08-23 PROCEDURE — 93005 ELECTROCARDIOGRAM TRACING: CPT | Performed by: STUDENT IN AN ORGANIZED HEALTH CARE EDUCATION/TRAINING PROGRAM

## 2022-08-23 PROCEDURE — 85027 COMPLETE CBC AUTOMATED: CPT | Performed by: STUDENT IN AN ORGANIZED HEALTH CARE EDUCATION/TRAINING PROGRAM

## 2022-08-23 PROCEDURE — 36415 COLL VENOUS BLD VENIPUNCTURE: CPT | Performed by: STUDENT IN AN ORGANIZED HEALTH CARE EDUCATION/TRAINING PROGRAM

## 2022-08-23 PROCEDURE — 93010 ELECTROCARDIOGRAM REPORT: CPT | Performed by: INTERNAL MEDICINE

## 2022-08-23 PROCEDURE — 99214 OFFICE O/P EST MOD 30 MIN: CPT | Performed by: STUDENT IN AN ORGANIZED HEALTH CARE EDUCATION/TRAINING PROGRAM

## 2022-08-23 PROCEDURE — 80048 BASIC METABOLIC PNL TOTAL CA: CPT | Performed by: STUDENT IN AN ORGANIZED HEALTH CARE EDUCATION/TRAINING PROGRAM

## 2022-08-23 ASSESSMENT — PATIENT HEALTH QUESTIONNAIRE - PHQ9
SUM OF ALL RESPONSES TO PHQ QUESTIONS 1-9: 0
SUM OF ALL RESPONSES TO PHQ QUESTIONS 1-9: 0
10. IF YOU CHECKED OFF ANY PROBLEMS, HOW DIFFICULT HAVE THESE PROBLEMS MADE IT FOR YOU TO DO YOUR WORK, TAKE CARE OF THINGS AT HOME, OR GET ALONG WITH OTHER PEOPLE: NOT DIFFICULT AT ALL

## 2022-08-23 ASSESSMENT — PAIN SCALES - GENERAL: PAINLEVEL: MILD PAIN (2)

## 2022-08-23 NOTE — PROGRESS NOTES
[facility]  [address]  [facility] [address]  Phone: [phone]  Fax: [phone]  Primary Provider: Zac Rushing  Pre-op Performing Provider: MAYA WAGONER    PREOPERATIVE EVALUATION:  Today's date: 8/23/2022    Mayur Patel is a 81 year old male who presents for a preoperative evaluation.    Surgical Information:  Surgery/Procedure: SI right fusion  Surgery Location: [place]   Surgeon: Dr NICOLE Clemens   Surgery Date: 8-30-22  Time of Surgery:  Where patient plans to recover: At home with family  Fax number for surgical facility: [facility] [phone]    Assessment & Plan     81-year-old male with past medical history of opioid dependence and chronic back pain, hypertension, depression, who presents for preop evaluation for SI joint fusion.  Patient is overall low risk than his age.  I do not recommend a BMP as he has CKD stage IV listed on his problem list and though his last BMP in September showed a GFR in stage II range so I think this is likely wrong.  We will check a CBC given potential for blood loss and an EKG given history of potential stroke and hypertension as well as his age.  He had already has a COVID test scheduled.  May proceed if no critical values on these test.    1. Pain syndrome, chronic    2. Benign essential hypertension  - BASIC METABOLIC PANEL; Future  - EKG 12-lead, tracing only    3. Preop general physical exam  - CBC with platelets; Future    The proposed surgical procedure is considered INTERMEDIATE risk.    Medication Instructions:  hold all medication except propanolol    RECOMMENDATION:  APPROVAL GIVEN to proceed with proposed procedure pending review of diagnostic evaluation.    Subjective     HPI related to upcoming procedure:     Last spine note 7/13/22 copied below:  7/6/22 MR Lumbar Spine - [facility]   FINDINGS:   5 lumbar-type vertebrae counting down from the presumed 12th ribs. The tip of the conus medullaris is at L2.  The cauda equina nerve roots and visualized lower thoracic spinal cord are within normal limits.     Normal alignment of the lumbar vertebrae. Modic type I opposing endplate degenerative signal changes about the disc spaces at T11-T12, T12-L1, L2-L3, L3-L4 and L4-L5. Modic type II opposing endplate degenerative signal changes at T12-L1 and L3-S1. Normal vertebral body heights. No fracture, destructive bone lesion or infection. Moderate symmetric lower lumbar predominant dorsal paraspinous muscular atrophy. The visualized bony pelvis is unremarkable.     Findings on a level-by-level basis are as follows:     T12-L1: Moderate disc height loss. Loss of disc signal. Disc bulge. No disc herniation. Facet arthrosis and ligamentum flavum thickening. Mild to moderate right and mild left neural foraminal stenosis. Mild spinal canal stenosis.     L1-L2: Mild to moderate disc height loss. Loss of disc signal. Disc bulge with superimposed 3 mm AP dimension central disc protrusion. Facet arthrosis and ligamentum flavum thickening. Mild to moderate left and mild right neural foraminal stenosis. Mild spinal canal stenosis.     L2-L3: Severe disc height loss. Loss of disc signal. Disc bulge. No disc herniation. Facet arthrosis and ligamentum flavum thickening. Mild to moderate bilateral neural foraminal stenosis. Moderate spinal canal stenosis.     L3-L4: Severe disc height loss. Loss of disc signal. Disc bulge. No disc herniation. Facet arthrosis and ligamentum flavum thickening. Moderate left and mild to moderate right neural foraminal stenosis. Moderate spinal canal stenosis.     L4-L5: Severe disc height loss. Loss of disc signal. Disc bulge. No disc herniation. Facet arthrosis and ligamentum flavum thickening. Mild to moderate bilateral neural foraminal stenosis. Mild spinal canal stenosis.     L5-S1: Severe disc height loss. Loss of disc signal. Disc bulge. No disc herniation. Facet arthrosis. Moderate bilateral neural  foraminal stenosis. Mild spinal canal stenosis.     IMPRESSION:   1. Multilevel lumbar spondylosis.   2. Moderate neural foraminal stenosis on the left at L3-L4 and bilaterally at L5-S1. Mild to moderate neural foraminal stenosis on the right at T12-L1, L3-L4, on the left at L1-L2 and bilaterally at L2-L3, L4-L5.   3. Moderate spinal canal stenosis at L2-L3 and L3-L4. Mild spinal canal stenosis T12-L2 and L4-S1.    Assessment:  1. Right SI DJD, sacroiliitis.   2. Marked lumbar DDD but workup has indicated not a major contributor to his pain pattern  3. No pain with hip ROM to suggest intra-articular hip pathology    Plan:  We discussed the option of an SI joint fusion today. We discussed the procedure at length. We discussed the goal of placing 3 small implants across the SI joint to allow stabilization and bony ingrowth to fuse the SI joint. We discussed the risks to include bleeding, infection, damage to surrounding structures including nerves, arteries, and veins, and complications of anesthesia including cardiac and pulmonary. We discussed the possibility of hardware migration or failure. We discussed the possibility of fracture of the iliac wing. We discussed the possibility that this would not completely eradicate his right hip pain and he may have residual pain even after this procedure. We discussed the prolonged recovery for this procedure. It would be a minimum of 3 weeks of toe-touch weightbearing on the right side with a walker. Most patients have pain out to 3 months. He wishes to proceed with surgery. He understands the anticipated intraoperative and postoperative treatment plan and course.        Preop Questions 8/23/2022   1. Have you ever had a heart attack or stroke? YES - previous stroke. None since. No chronic deficits. No heart disease.    2. Have you ever had surgery on your heart or blood vessels, such as a stent placement, a coronary artery bypass, or surgery on an artery in your head, neck,  heart, or legs? No   3. Do you have chest pain with activity? No   4. Do you have a history of  heart failure? No   5. Do you currently have a cold, bronchitis or symptoms of other infection? No   6. Do you have a cough, shortness of breath, or wheezing? No   7. Do you or anyone in your family have previous history of blood clots? No   8. Do you or does anyone in your family have a serious bleeding problem such as prolonged bleeding following surgeries or cuts? No   9. Have you ever had problems with anemia or been told to take iron pills? No   10. Have you had any abnormal blood loss such as black, tarry or bloody stools? No   11. Have you ever had a blood transfusion? No   12. Are you willing to have a blood transfusion if it is medically needed before, during, or after your surgery? Yes   13. Have you or any of your relatives ever had problems with anesthesia? No   14. Do you have sleep apnea, excessive snoring or daytime drowsiness? No   15. Do you have any artifical heart valves or other implanted medical devices like a pacemaker, defibrillator, or continuous glucose monitor? No   16. Do you have artificial joints? No   17. Are you allergic to latex? No     Health Care Directive:  Patient does not have a Health Care Directive or Living Will: Full COde    Answers for HPI/ROS submitted by the patient on 8/23/2022  If you checked off any problems, how difficult have these problems made it for you to do your work, take care of things at home, or get along with other people?: Not difficult at all  PHQ9 TOTAL SCORE: 0    Preoperative Review of :   reviewed - controlled substances reflected in medication list.    Status of Chronic Conditions:  See problem list for active medical problems.  Problems all longstanding and stable, except as noted/documented.  See ROS for pertinent symptoms related to these conditions.      Review of Systems  Complete ROS is negative except as noted in HPI    Patient Active Problem  List    Diagnosis Date Noted     Opioid dependence with opioid-induced disorder (H) 03/09/2022     Priority: Medium     Current moderate episode of major depressive disorder without prior episode (H) 03/09/2022     Priority: Medium     CKD (chronic kidney disease) stage 4, GFR 15-29 ml/min (H) 03/09/2022     Priority: Medium     Nasal mass 01/22/2014     Priority: Medium         Past Surgical History:   Procedure Laterality Date     BIOPSY PROSTATE NEEDLE / PUNCH / INCISIONAL N/A 3/16/2021    Procedure: TRANSRECTAL ULTRASOUND GUIDED PROSTATE BIOPSY;  Surgeon: Frandy Hathaway MD;  Location: [facility];  Service: Urology     COLONOSCOPY       CYSTOURETHROSCOPY Bilateral 4/26/2021    Procedure: CYSTOURETHROSCOPY, WITH INSERTION OF PERMANENT ADJUSTABLE TRANSPROSTATIC IMPLANT, SINGLE IMPLANT;  Surgeon: Frandy Hathaway MD;  Location: [facility];  Service: Urology     IR CAROTID ANGIOGRAM  6/14/2009     IR CAROTID ANGIOGRAM  6/14/2009     IR MISCELLANEOUS PROCEDURE  6/14/2009     IR MISCELLANEOUS PROCEDURE  6/14/2009     OPTICAL TRACKING SYSTEM ENDOSCOPIC ENDONASAL SURGERY  2/28/2014    Procedure: OPTICAL TRACKING SYSTEM ENDOSCOPIC ENDONASAL SURGERY;  Stealth Guided Bilateral Inferior Turbinate Reduction, Maxillary Antrostomy And Polypectomy, Maxillary Cyst removal ;  Surgeon: Mandy Lauren MD;  Location: [facility]     RHYTIDECTOMY (FACELIFT)         Current Outpatient Medications   Medication     citalopram (CELEXA) 20 MG tablet     HYDROcodone-acetaminophen (NORCO) 5-325 MG per tablet     lovastatin (MEVACOR) 20 MG tablet     Magnesium 400 MG CAPS     modafinil (PROVIGIL) 200 MG tablet     Nutritional Supplements (DHEA PO)     ondansetron (ZOFRAN-ODT) 4 MG ODT tab     oxymetazoline (AFRIN NASAL SPRAY) 0.05 % nasal spray     PREGNENOLONE     Propranolol HCl (INDERAL PO)     sodium chloride (OCEAN) 0.65 % nasal spray     tadalafil (CIALIS) 5 MG tablet     tamsulosin (FLOMAX) 0.4 MG capsule      TESTOSTERONE     thyroid (ARMOUR THYROID) 120 MG tablet     tiZANidine (ZANAFLEX) 2 MG tablet     traMADol (ULTRAM-ER) 100 MG 24 hr tablet     traZODone (DESYREL) 100 MG tablet     No current facility-administered medications for this visit.          Allergies   Allergen Reactions     Penicillins Hives, Itching and Rash        Social History     Socioeconomic History     Marital status:      Spouse name: Not on file     Number of children: Not on file     Years of education: Not on file     Highest education level: Not on file   Occupational History     Not on file   Tobacco Use     Smoking status: Former Smoker     Types: Cigarettes     Smokeless tobacco: Never Used   Substance and Sexual Activity     Alcohol use: Yes     Comment: Alcoholic Drinks/day: Social     Drug use: Never     Sexual activity: Not on file   Other Topics Concern     Parent/sibling w/ CABG, MI or angioplasty before 65F 55M? Not Asked   Social History Narrative    Oral maxillofacial surgeon in [place]      Social [facility]     Financial Resource Strain: Not on file   Food Insecurity: Not on file   Transportation Needs: Not on file   Physical Activity: Not on file   Stress: Not on file   Social Connections: Not on file   Intimate Partner Violence: Not on file   Housing Stability: Not on file       Family History   Problem Relation Age of Onset     Substance Abuse Son      Hypertension Mother          Objective   There were no vitals taken for this visit.    General appearance: Alert, cooperative, no distress, appears stated age  Head: Normocephalic, atraumatic, without obvious abnormality  Eyes: Pupils equal round, reactive.  Conjunctiva clear.  Nose: Nares normal, no drainage.  Throat: Lips, mucosa, tongue normal mucosa pink and moist  Neck: Supple, symmetric, trachea midline, no adenopathy.  No thyroid enlargement, tenderness or nodules.    Lungs: Clear to auscultation bilaterally, no wheezing or crackles present.   Respirations unlabored  Heart: Regular rate and rhythm, normal S1 and S2, no murmur, rub or gallop.  Abdomen: Soft, nontender, nondistended.  Bowel sounds active in all 4 quadrants.  No masses or organomegaly.  Extremities: Extremities normal, atraumatic.  No cyanosis or edema.  Skin: Skin color, texture, turgor normal no rashes or lesions on limited skin exam  Neurologic: CN II through XII intact, normal strength.    Diagnostics:  Labs pending at this time.  Results will be reviewed when available.   EKG required for Sroke Hx and Hypertension and Age and not completed in the last 90 days.     Revised Cardiac Risk Index (RCRI):  The patient has the following serious cardiovascular risks for perioperative complications:   - No serious cardiac risks = 0 points     RCRI Interpretation: 0 points: Class I (very low risk - 0.4% complication rate)            Signed Electronically by: Chandu You MD  Copy of this evaluation report is provided to requesting physician.}

## 2022-08-24 LAB
ATRIAL RATE - MUSE: 81 BPM
DIASTOLIC BLOOD PRESSURE - MUSE: NORMAL MMHG
INTERPRETATION ECG - MUSE: NORMAL
P AXIS - MUSE: 56 DEGREES
PR INTERVAL - MUSE: 154 MS
QRS DURATION - MUSE: 110 MS
QT - MUSE: 398 MS
QTC - MUSE: 462 MS
R AXIS - MUSE: -21 DEGREES
SYSTOLIC BLOOD PRESSURE - MUSE: NORMAL MMHG
T AXIS - MUSE: 25 DEGREES
VENTRICULAR RATE- MUSE: 81 BPM

## 2022-08-24 NOTE — RESULT ENCOUNTER NOTE
Mayur,  Your results from your recent clinic visit show:  Your BMP was normal with normal electrolytes and kidney function  Your Hemoglobin on your CBC is down a little. The results point to possibly nutritional deficiencies or alcohol as potential causes for this among other. I would recommend follow up with Dr. Skinner about this but fine to proceed with surgery in the meantime    If you have more questions please call the clinic at [phone] or send me a [employer] message    Dr. Chandu Beltrán

## 2022-08-26 ENCOUNTER — LAB (OUTPATIENT)
Dept: LAB | Facility: CLINIC | Age: 82
End: 2022-08-26
Payer: MEDICARE

## 2022-08-26 DIAGNOSIS — Z20.822 ENCOUNTER FOR LABORATORY TESTING FOR COVID-19 VIRUS: ICD-10-CM

## 2022-08-26 PROCEDURE — U0003 INFECTIOUS AGENT DETECTION BY NUCLEIC ACID (DNA OR RNA); SEVERE ACUTE RESPIRATORY SYNDROME CORONAVIRUS 2 (SARS-COV-2) (CORONAVIRUS DISEASE [COVID-19]), AMPLIFIED PROBE TECHNIQUE, MAKING USE OF HIGH THROUGHPUT TECHNOLOGIES AS DESCRIBED BY CMS-2020-01-R: HCPCS

## 2022-08-26 PROCEDURE — U0005 INFEC AGEN DETEC AMPLI PROBE: HCPCS

## 2022-08-27 LAB — SARS-COV-2 RNA RESP QL NAA+PROBE: NEGATIVE

## 2022-10-12 ENCOUNTER — LAB REQUISITION (OUTPATIENT)
Dept: LAB | Facility: CLINIC | Age: 82
End: 2022-10-12
Payer: MEDICARE

## 2022-10-12 DIAGNOSIS — R79.89 OTHER SPECIFIED ABNORMAL FINDINGS OF BLOOD CHEMISTRY: ICD-10-CM

## 2022-10-13 ENCOUNTER — PATIENT OUTREACH (OUTPATIENT)
Dept: CARE COORDINATION | Facility: CLINIC | Age: 82
End: 2022-10-13

## 2022-10-13 RX ORDER — LANOLIN ALCOHOL/MO/W.PET/CERES
1 CREAM (GRAM) TOPICAL AT BEDTIME
COMMUNITY
End: 2022-10-19

## 2022-10-13 RX ORDER — NYSTATIN 100000 [USP'U]/G
POWDER TOPICAL 2 TIMES DAILY
COMMUNITY

## 2022-10-13 RX ORDER — CALCIUM CARBONATE 500 MG/1
2 TABLET, CHEWABLE ORAL 3 TIMES DAILY PRN
COMMUNITY

## 2022-10-13 RX ORDER — AMLODIPINE BESYLATE 5 MG/1
5 TABLET ORAL DAILY
COMMUNITY
End: 2022-10-26

## 2022-10-13 RX ORDER — MIRTAZAPINE 7.5 MG/1
7.5 TABLET, FILM COATED ORAL AT BEDTIME
Status: ON HOLD | COMMUNITY
End: 2023-08-01

## 2022-10-13 RX ORDER — METHYLPHENIDATE HYDROCHLORIDE 10 MG/1
10 TABLET ORAL 2 TIMES DAILY
COMMUNITY
End: 2022-10-21

## 2022-10-13 RX ORDER — METHOCARBAMOL 500 MG/1
1000 TABLET, FILM COATED ORAL 4 TIMES DAILY PRN
COMMUNITY
End: 2022-11-02

## 2022-10-13 RX ORDER — ACETAMINOPHEN 500 MG
1000 TABLET ORAL 3 TIMES DAILY
COMMUNITY

## 2022-10-13 RX ORDER — HYDROMORPHONE HYDROCHLORIDE 2 MG/1
2-4 TABLET ORAL EVERY 4 HOURS PRN
COMMUNITY
End: 2022-10-18

## 2022-10-13 RX ORDER — HYDROXYZINE HYDROCHLORIDE 25 MG/1
25 TABLET, FILM COATED ORAL EVERY 6 HOURS PRN
COMMUNITY
End: 2022-11-02

## 2022-10-13 RX ORDER — MULTIVITAMIN,THERAPEUTIC
1 TABLET ORAL DAILY
COMMUNITY

## 2022-10-13 RX ORDER — POLYETHYLENE GLYCOL 3350 17 G/17G
1 POWDER, FOR SOLUTION ORAL DAILY
COMMUNITY

## 2022-10-13 RX ORDER — MULTIVIT WITH MINERALS/LUTEIN
250 TABLET ORAL DAILY
COMMUNITY

## 2022-10-13 RX ORDER — AMOXICILLIN 250 MG
1 CAPSULE ORAL DAILY
COMMUNITY

## 2022-10-13 RX ORDER — CEFTRIAXONE 2 G/1
2 INJECTION, POWDER, FOR SOLUTION INTRAMUSCULAR; INTRAVENOUS EVERY 24 HOURS
COMMUNITY
End: 2022-11-02

## 2022-10-13 RX ORDER — ERGOCALCIFEROL 1.25 MG/1
50000 CAPSULE, LIQUID FILLED ORAL WEEKLY
COMMUNITY

## 2022-10-13 RX ORDER — LIDOCAINE 4 G/G
1 PATCH TOPICAL EVERY 24 HOURS
COMMUNITY
End: 2022-10-19

## 2022-10-13 RX ORDER — GABAPENTIN 100 MG/1
200 CAPSULE ORAL 3 TIMES DAILY
COMMUNITY

## 2022-10-14 ENCOUNTER — PATIENT OUTREACH (OUTPATIENT)
Dept: CARE COORDINATION | Facility: CLINIC | Age: 82
End: 2022-10-14

## 2022-10-14 ENCOUNTER — TRANSITIONAL CARE UNIT VISIT (OUTPATIENT)
Dept: GERIATRICS | Facility: CLINIC | Age: 82
End: 2022-10-14
Payer: MEDICARE

## 2022-10-14 VITALS
DIASTOLIC BLOOD PRESSURE: 71 MMHG | HEIGHT: 68 IN | OXYGEN SATURATION: 97 % | TEMPERATURE: 98.9 F | HEART RATE: 76 BPM | RESPIRATION RATE: 18 BRPM | BODY MASS INDEX: 24.4 KG/M2 | WEIGHT: 161 LBS | SYSTOLIC BLOOD PRESSURE: 126 MMHG

## 2022-10-14 DIAGNOSIS — G89.4 PAIN SYNDROME, CHRONIC: ICD-10-CM

## 2022-10-14 DIAGNOSIS — G89.18 ACUTE POST-OPERATIVE PAIN: ICD-10-CM

## 2022-10-14 DIAGNOSIS — R78.81 BACTEREMIA: ICD-10-CM

## 2022-10-14 DIAGNOSIS — M79.89 NECROTIZING SOFT TISSUE INFECTION: Primary | ICD-10-CM

## 2022-10-14 DIAGNOSIS — D64.9 ACUTE ANEMIA: ICD-10-CM

## 2022-10-14 DIAGNOSIS — N40.0 BENIGN PROSTATIC HYPERPLASIA, UNSPECIFIED WHETHER LOWER URINARY TRACT SYMPTOMS PRESENT: ICD-10-CM

## 2022-10-14 DIAGNOSIS — F33.9 RECURRENT MAJOR DEPRESSIVE DISORDER, REMISSION STATUS UNSPECIFIED (H): ICD-10-CM

## 2022-10-14 DIAGNOSIS — G47.01 INSOMNIA DUE TO MEDICAL CONDITION: ICD-10-CM

## 2022-10-14 DIAGNOSIS — I10 PRIMARY HYPERTENSION: ICD-10-CM

## 2022-10-14 DIAGNOSIS — R53.81 PHYSICAL DECONDITIONING: ICD-10-CM

## 2022-10-14 DIAGNOSIS — E43 SEVERE PROTEIN-CALORIE MALNUTRITION (H): ICD-10-CM

## 2022-10-14 DIAGNOSIS — K59.01 SLOW TRANSIT CONSTIPATION: ICD-10-CM

## 2022-10-14 DIAGNOSIS — E87.1 HYPONATREMIA: ICD-10-CM

## 2022-10-14 PROCEDURE — 99306 1ST NF CARE HIGH MDM 50: CPT | Performed by: INTERNAL MEDICINE

## 2022-10-14 NOTE — PROGRESS NOTES
[facility] GERIATRICS  INITIAL VISIT NOTE  October 14, 2022    PRIMARY CARE PROVIDER AND CLINIC:  Zac Rushing [address] / [facility] [address]    [facility] Medical Record Number:  [id number]  Place of Service where encounter took place:  [facility] ([facility]) [[id number]]    Chief Complaint   Patient presents with     Hospital F/U       HPI:    Mayur LE Gerardofranco is a 81 year old  (1940) male seen today at [facility] Medical history is notable for chronic back pain, HTN, depression. He has had a complex recent medical course:    **[facility] 8/30/22 - AMS, hypotension after surgery on SI joint earlier in the day along with opioids and alcohol. SBPs in 60s for EMS. He was monitored and discharged.     **[facility] 9/13/22 to 10/13/22 -- presented to the ER with R sided and RLE pain and AMS in setting of recent right SI fusion (8/30/22). He was admitted with a necrotizing soft tissue infection and was taken to the OR for multiple I&D, washout and debridements with partial wound closure on 10/4/22. A wound VAC was placed, he has a CANDIS drain.  -  Intra op cultures grew Strep constellatus and Haemophilus parainfluenzae and he is on prolonged course of IV antibiotics.   - Hospital course was complicated by sepsis type picture (hypotension, RASHAD) in setting of Strep constellatus and Staph haemolyticus bacteremia.   - Also with difficult to control pain as well as anhedonia/suicidal ideation. Psychiatry consulted and PTA citalopram discontinued due to prolonged QT.   - New on amitriptyline, hydroxyzine, mirtazapine and methylphenidate (latter for energy).    He was admitted to this facility for  rehab, medical management and nursing care.      History obtained from: facility chart records, facility staff, patient report, [facility] chart review and Care Everywhere [facility] chart review.      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  Main concern today is pain - he does not want to be in pain. Reviewed hospital course and medication changes, including several that have less than ideal side effects. Reviewed labs including Hgb of 7 and he is OK starting some iron. He does not want lab draws but explained need to do this for the antibiotics. Nursing just getting to know him today - no concerns. Will start to work with therapies today.     CODE STATUS: CPR/Full code     ALLERGIES:  Allergies   Allergen Reactions     Penicillins Hives, Itching and Rash       PAST MEDICAL HISTORY:   Past Medical History:   Diagnosis Date     Back pain      BPH (benign prostatic hyperplasia)      Disease of thyroid gland      HTN (hypertension)      Hypertension      Sinusitis      Stroke (H)      Unspecified cerebral artery occlusion with cerebral infarction        PAST SURGICAL HISTORY:   Past Surgical History:   Procedure Laterality Date     BIOPSY PROSTATE NEEDLE / PUNCH / INCISIONAL N/A 3/16/2021    Procedure: TRANSRECTAL ULTRASOUND GUIDED PROSTATE BIOPSY;  Surgeon: Frandy Hathaway MD;  Location: [facility];  Service: Urology     COLONOSCOPY       CYSTOURETHROSCOPY Bilateral 4/26/2021    Procedure: CYSTOURETHROSCOPY, WITH INSERTION OF PERMANENT ADJUSTABLE TRANSPROSTATIC IMPLANT, SINGLE IMPLANT;  Surgeon: Frandy Hathaway MD;  Location: [facility];  Service: Urology     IR CAROTID ANGIOGRAM  6/14/2009     IR CAROTID ANGIOGRAM  6/14/2009     IR MISCELLANEOUS PROCEDURE  6/14/2009     IR MISCELLANEOUS PROCEDURE  6/14/2009     OPTICAL TRACKING SYSTEM ENDOSCOPIC ENDONASAL SURGERY  2/28/2014    Procedure: OPTICAL TRACKING SYSTEM ENDOSCOPIC ENDONASAL SURGERY;  Stealth Guided Bilateral Inferior Turbinate Reduction, Maxillary Antrostomy And Polypectomy, Maxillary Cyst removal ;  Surgeon: Mandy Lauren MD;  Location: [facility] OR     RHYTIDECTOMY (FACELIFT)         FAMILY HISTORY:   Family History   Problem Relation Age of Onset     Substance Abuse Son       Hypertension Mother        SOCIAL HISTORY:   Patient's living condition: lives with fiance    MEDICATIONS:  Post Discharge Medication Reconciliation Status: discharge medications reconciled and changed, per note/orders.    Current Outpatient Medications   Medication Sig Dispense Refill     acetaminophen (TYLENOL) 500 MG tablet Take 1,000 mg by mouth 3 times daily       amitriptyline (ELAVIL) 25 MG tablet Take 25 mg by mouth At Bedtime       amLODIPine (NORVASC) 5 MG tablet Take 5 mg by mouth daily       calcium carbonate (TUMS) 500 MG chewable tablet Take 2 chew tab by mouth 3 times daily as needed for heartburn       cefTRIAXone sodium 2 g SOLR Inject 2 g into the vein every 24 hours Last dose 10/31/22       ferrous sulfate (FEROSUL) 325 (65 Fe) MG tablet Take 325 mg by mouth every other day       gabapentin (NEURONTIN) 100 MG capsule Take 200 mg by mouth 3 times daily       HYDROmorphone (DILAUDID) 2 MG tablet Take 2-4 mg by mouth every 4 hours as needed for severe pain       hydrOXYzine (ATARAX) 25 MG tablet Take 25 mg by mouth every 6 hours as needed for anxiety       Lidocaine (LIDOCARE) 4 % Patch Place 1 patch onto the skin every 24 hours To prevent lidocaine toxicity, patient should be patch free for 12 hrs daily.       melatonin 3 MG tablet Take 1 mg by mouth At Bedtime       methocarbamol (ROBAXIN) 500 MG tablet Take 1,000 mg by mouth 4 times daily as needed for muscle spasms       methylphenidate (RITALIN) 10 MG tablet Take 10 mg by mouth 2 times daily       mirtazapine (REMERON) 7.5 MG tablet Take 7.5 mg by mouth At Bedtime       multivitamin, therapeutic (THERA-VIT) TABS tablet Take 1 tablet by mouth daily       nystatin (MYCOSTATIN) 181745 UNIT/GM external powder Apply topically 2 times daily To groin       polyethylene glycol (MIRALAX) 17 g packet Take 1 packet by mouth daily       Protein (PROSOURCE PO) Take 90 mLs by mouth daily Mix with 120 ml water       senna-docusate (SENOKOT-S/PERICOLACE)  "8.6-50 MG tablet Take 1 tablet by mouth daily       tamsulosin (FLOMAX) 0.4 MG capsule Take 1 capsule (0.4 mg) by mouth 2 times daily 180 capsule 0     vitamin C (ASCORBIC ACID) 250 MG tablet Take 250 mg by mouth daily       vitamin D2 (ERGOCALCIFEROL) 15145 units (1250 mcg) capsule Take 50,000 Units by mouth once a week On Fridays       zinc Oxide (DESITIN) 40 % paste Apply topically 4 times daily as needed for dry skin or irritation Apply as needed for perianal irritation after BM         ROS:  10 point ROS neg other than the symptoms noted above in the HPI.    PHYSICAL EXAM:  /71   Pulse 76   Temp 98.9  F (37.2  C)   Resp 18   Ht 1.727 m (5' 8\")   Wt 73 kg (161 lb)   SpO2 97%   BMI 24.48 kg/m    Gen: sitting in wheelchair, alert, cooperative and in no acute distress  HEENT: normocephalic; NG in right nare; good hearing acuity; moist mucous membranes in mouth; eyes without scleral icterus or scleral injection  Card: RRR, S1, S2, no murmurs  Resp: lungs clear to auscultation bilaterally, no crackles or wheezes  Ext: decreased muscle tone; no LE edema  Neuro: CX II-XII grossly in tact; ROM in all four extremities grossly in tact  Psych: alert and oriented x3; normal affect  Skin: CANDIS drain in place with bulb 2/3 full of serosanguinous fluid; wound VAC in place     LABORATORY/IMAGING DATA:  [facility]    ASSESSMENT/PLAN:    Necrotizing Soft Tissue Infection s/p Multiple I&D, Wound VAC Placement  Strep constellatus & Staph haemolyticus Bacteremia  Acute on Chronic Pain Syndrome  Chronic Low Back Pain  Recent SI joint fusion (8/30). Has CANDIS drain and wound VAC.  -- wound cares and PICC cares as ordered   -- CANDIS drain cares as ordered    -- analgesia with APAP 1000 mg TID, amitriptyline 25 mg at bedtime, gabapentin 200  Mg TID, hydromorphone 2-4 mg q4h PRN, hydroxyzine 25 mg q6h PRN, methocarbamol 1000 mg QID PRN  -- ceftriaxone 2g IV q24 hours (last day 10/31/22)  -- weekly " labs per ID faxed to them (CBC with Diff, Creat, BUN, AST, ALk Phos, ALT, CRP)  -- follow for clinical signs of infection  -- follow up with ID, surgeon as scheduled  -- amitriptyline and hydroxyzine are not good meds for an 82 yo and methocarbamol dose is quite high - would like to taper off the need for all of these or at least reduce doses - no changes today as has been at TCU <24h    Hyponatremia  Na 129-130. ?if some SIADH from pain  -- will trend with weekly labs on Thursdays    Acute Anemia   Multifactorial from blood loss and malnutrition. Hugo Hgb 4.9 in late Sept (!). Hgb 7.2 on 10/11.   -- start FeSO4 325 mg every other day   -- ordered Fe sat % with labs next week     Major Recurrent Depression  Suicidal Ideation, Resolved  Psychiatry consulted during hospitalization. PTA citalopram discontinued due to prolonged QT. New on mirtazapine and methylphenidate (latter for energy)  -- mirtazapine 7.5 mg at bedtime  -- methylphenidate 10 mg BID  -- consider having ACP follow at facility  -- supportive cares    Protein Calorie Malnutrition  In setting of critical illness, prolonged hospitalization   -- Prostat daily  -- NG tube cares/monitoring as ordrered  -- nutrition following, calorie counts     HTN  SBPs 100s-110s with limited data. New on amlodipine during hospitalization - ?if Bps elevated due to pain.   -- amlodipine 5 mg daily   -- add hold for SBP <115  -- follow BPs and adjust medications as needed     BPH  -- tamsulosin 0.4 mg daily     Insomnia  -- melatonin 3 mg     Slow Transit Constipation  -- Miralax 17g daily and Senna-S 1 tab daily   -- adjust bowel regimen as needed    Physical Deconditioning  In setting of hospitalization and underlying medical conditions  -- ongoing PT/OT    Electronically signed by:  Isabella Zhao MD

## 2022-10-14 NOTE — LETTER
[facility]       To:  ISRAEL Garrett            Please give to facility      From:   Nataliya Sarmiento [facility]  Care Coordinator   [facility]   P: [phone]  [email or web address]        Patient Name:    Mayur Patel   YOB: 1940   Admit date:   10-        Information Needed:  Please contact me when the patient will discharge (or if they will move to long term care)- include the discharge date, disposition, and main diagnosis       - If the patient is discharged with home care services, please provide the name of the agency    Also- Please inform me if a care conference is being held.     Phone or Email with information                              Thank you

## 2022-10-14 NOTE — PROGRESS NOTES
Clinic Care Coordination Contact  Care Coordination Transition Communication         Clinical Data: Patient was hospitalized at [facility] 9/13/22 to 10/13/22 -- presented to the ER with R sided and RLE pain and AMS in setting of recent right SI fusion (8/30/22). He was admitted with a necrotizing soft tissue infection and was taken to the OR for multiple I&D, washout and debridements with partial wound closure on 10/4/22. A wound VAC was placed, he has a CANDIS drain.  -  Intra op cultures grew Strep constellatus and Haemophilus parainfluenzae and he is on prolonged course of IV antibiotics.   - Hospital course was complicated by sepsis type picture (hypotension, RASHAD) in setting of Strep constellatus and Staph haemolyticus bacteremia.   - Also with difficult to control pain as well as anhedonia/suicidal ideation. Psychiatry consulted and PTA citalopram discontinued due to prolonged QT.   - New on amitriptyline, hydroxyzine, mirtazapine and methylphenidate (latter for energy).    Transition to Facility:              Facility Name: [facility]              Contact name and phone number/fax: [phone]    Plan: RN/SW Care Coordinator will await notification from facility staff informing RN/SW Care Coordinator of patient's discharge plans/needs. RN/SW Care Coordinator will review chart and outreach to facility staff every 4 weeks and as needed.     Nataliya Sarmiento,   [facility]  [phone]

## 2022-10-14 NOTE — LETTER
10/14/2022        RE: Mayur Patel  [address]  [facility] [address]        M [facility] GERIATRICS  INITIAL VISIT NOTE  October 14, 2022    PRIMARY CARE PROVIDER AND CLINIC:  Zac Rushing [address] / [place] [address]    M [facility] Medical Record Number:  [id number]  Place of Service where encounter took place:  [facility] ([facility]) [[id number]]    Chief Complaint   Patient presents with     Hospital F/U       HPI:    Mayur Patel is a 81 year old  (1940) male seen today at [facility] Medical history is notable for chronic back pain, HTN, depression. He has had a complex recent medical course:    **[facility] 8/30/22 - AMS, hypotension after surgery on SI joint earlier in the day along with opioids and alcohol. SBPs in 60s for EMS. He was monitored and discharged.     **[facility] 9/13/22 to 10/13/22 -- presented to the ER with R sided and RLE pain and AMS in setting of recent right SI fusion (8/30/22). He was admitted with a necrotizing soft tissue infection and was taken to the OR for multiple I&D, washout and debridements with partial wound closure on 10/4/22. A wound VAC was placed, he has a CANDIS drain.  -  Intra op cultures grew Strep constellatus and Haemophilus parainfluenzae and he is on prolonged course of IV antibiotics.   - Hospital course was complicated by sepsis type picture (hypotension, RASHAD) in setting of Strep constellatus and Staph haemolyticus bacteremia.   - Also with difficult to control pain as well as anhedonia/suicidal ideation. Psychiatry consulted and PTA citalopram discontinued due to prolonged QT.   - New on amitriptyline, hydroxyzine, mirtazapine and methylphenidate (latter for energy).    He was admitted to this facility for  rehab, medical management and nursing care.      History obtained from: facility chart records, facility staff, patient report, [facility] chart review and Care Everywhere [facility] chart  review.      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 Main concern today is pain - he does not want to be in pain. Reviewed hospital course and medication changes, including several that have less than ideal side effects. Reviewed labs including Hgb of 7 and he is OK starting some iron. He does not want lab draws but explained need to do this for the antibiotics. Nursing just getting to know him today - no concerns. Will start to work with therapies today.     CODE STATUS: CPR/Full code     ALLERGIES:  Allergies   Allergen Reactions     Penicillins Hives, Itching and Rash       PAST MEDICAL HISTORY:   Past Medical History:   Diagnosis Date     Back pain      BPH (benign prostatic hyperplasia)      Disease of thyroid gland      HTN (hypertension)      Hypertension      Sinusitis      Stroke (H)      Unspecified cerebral artery occlusion with cerebral infarction        PAST SURGICAL HISTORY:   Past Surgical History:   Procedure Laterality Date     BIOPSY PROSTATE NEEDLE / PUNCH / INCISIONAL N/A 3/16/2021    Procedure: TRANSRECTAL ULTRASOUND GUIDED PROSTATE BIOPSY;  Surgeon: Frandy Hathaway MD;  Location: [facility];  Service: Urology     COLONOSCOPY       CYSTOURETHROSCOPY Bilateral 4/26/2021    Procedure: CYSTOURETHROSCOPY, WITH INSERTION OF PERMANENT ADJUSTABLE TRANSPROSTATIC IMPLANT, SINGLE IMPLANT;  Surgeon: Frandy Hathaway MD;  Location: [facility];  Service: Urology     IR CAROTID ANGIOGRAM  6/14/2009     IR CAROTID ANGIOGRAM  6/14/2009     IR MISCELLANEOUS PROCEDURE  6/14/2009     IR MISCELLANEOUS PROCEDURE  6/14/2009     OPTICAL TRACKING SYSTEM ENDOSCOPIC ENDONASAL SURGERY  2/28/2014    Procedure: OPTICAL TRACKING SYSTEM ENDOSCOPIC ENDONASAL SURGERY;  Stealth Guided Bilateral Inferior Turbinate Reduction, Maxillary Antrostomy And Polypectomy, Maxillary Cyst removal ;  Surgeon: Mandy Lauren MD;  Location: [facility] OR     RHYTIDECTOMY  (FACELIFT)         FAMILY HISTORY:   Family History   Problem Relation Age of Onset     Substance Abuse Son      Hypertension Mother        SOCIAL HISTORY:   Patient's living condition: lives with fiance    MEDICATIONS:  Post Discharge Medication Reconciliation Status: discharge medications reconciled and changed, per note/orders.    Current Outpatient Medications   Medication Sig Dispense Refill     acetaminophen (TYLENOL) 500 MG tablet Take 1,000 mg by mouth 3 times daily       amitriptyline (ELAVIL) 25 MG tablet Take 25 mg by mouth At Bedtime       amLODIPine (NORVASC) 5 MG tablet Take 5 mg by mouth daily       calcium carbonate (TUMS) 500 MG chewable tablet Take 2 chew tab by mouth 3 times daily as needed for heartburn       cefTRIAXone sodium 2 g SOLR Inject 2 g into the vein every 24 hours Last dose 10/31/22       ferrous sulfate (FEROSUL) 325 (65 Fe) MG tablet Take 325 mg by mouth every other day       gabapentin (NEURONTIN) 100 MG capsule Take 200 mg by mouth 3 times daily       HYDROmorphone (DILAUDID) 2 MG tablet Take 2-4 mg by mouth every 4 hours as needed for severe pain       hydrOXYzine (ATARAX) 25 MG tablet Take 25 mg by mouth every 6 hours as needed for anxiety       Lidocaine (LIDOCARE) 4 % Patch Place 1 patch onto the skin every 24 hours To prevent lidocaine toxicity, patient should be patch free for 12 hrs daily.       melatonin 3 MG tablet Take 1 mg by mouth At Bedtime       methocarbamol (ROBAXIN) 500 MG tablet Take 1,000 mg by mouth 4 times daily as needed for muscle spasms       methylphenidate (RITALIN) 10 MG tablet Take 10 mg by mouth 2 times daily       mirtazapine (REMERON) 7.5 MG tablet Take 7.5 mg by mouth At Bedtime       multivitamin, therapeutic (THERA-VIT) TABS tablet Take 1 tablet by mouth daily       nystatin (MYCOSTATIN) 875323 UNIT/GM external powder Apply topically 2 times daily To groin       polyethylene glycol (MIRALAX) 17 g packet Take 1 packet by mouth daily       Protein  "(PROSOURCE PO) Take 90 mLs by mouth daily Mix with 120 ml water       senna-docusate (SENOKOT-S/PERICOLACE) 8.6-50 MG tablet Take 1 tablet by mouth daily       tamsulosin (FLOMAX) 0.4 MG capsule Take 1 capsule (0.4 mg) by mouth 2 times daily 180 capsule 0     vitamin C (ASCORBIC ACID) 250 MG tablet Take 250 mg by mouth daily       vitamin D2 (ERGOCALCIFEROL) 07079 units (1250 mcg) capsule Take 50,000 Units by mouth once a week On Fridays       zinc Oxide (DESITIN) 40 % paste Apply topically 4 times daily as needed for dry skin or irritation Apply as needed for perianal irritation after BM         ROS:  10 point ROS neg other than the symptoms noted above in the HPI.    PHYSICAL EXAM:  /71   Pulse 76   Temp 98.9  F (37.2  C)   Resp 18   Ht 1.727 m (5' 8\")   Wt 73 kg (161 lb)   SpO2 97%   BMI 24.48 kg/m    Gen: sitting in wheelchair, alert, cooperative and in no acute distress  HEENT: normocephalic; NG in right nare; good hearing acuity; moist mucous membranes in mouth; eyes without scleral icterus or scleral injection  Card: RRR, S1, S2, no murmurs  Resp: lungs clear to auscultation bilaterally, no crackles or wheezes  Ext: decreased muscle tone; no LE edema  Neuro: CX II-XII grossly in tact; ROM in all four extremities grossly in tact  Psych: alert and oriented x3; normal affect  Skin: CANDIS drain in place with bulb 2/3 full of serosanguinous fluid; wound VAC in place     LABORATORY/IMAGING DATA:  [facility]    ASSESSMENT/PLAN:    Necrotizing Soft Tissue Infection s/p Multiple I&D, Wound VAC Placement  Strep constellatus & Staph haemolyticus Bacteremia  Acute on Chronic Pain Syndrome  Chronic Low Back Pain  Recent SI joint fusion (8/30). Has CANDIS drain and wound VAC.  -- wound cares and PICC cares as ordered   -- CANDIS drain cares as ordered    -- analgesia with APAP 1000 mg TID, amitriptyline 25 mg at bedtime, gabapentin 200  Mg TID, hydromorphone 2-4 mg q4h PRN, hydroxyzine 25 " mg q6h PRN, methocarbamol 1000 mg QID PRN  -- ceftriaxone 2g IV q24 hours (last day 10/31/22)  -- weekly labs per ID faxed to them (CBC with Diff, Creat, BUN, AST, ALk Phos, ALT, CRP)  -- follow for clinical signs of infection  -- follow up with ID, surgeon as scheduled  -- amitriptyline and hydroxyzine are not good meds for an 80 yo and methocarbamol dose is quite high - would like to taper off the need for all of these or at least reduce doses - no changes today as has been at TCU <24h    Hyponatremia  Na 129-130. ?if some SIADH from pain  -- will trend with weekly labs on Thursdays    Acute Anemia   Multifactorial from blood loss and malnutrition. Hugo Hgb 4.9 in late Sept (!). Hgb 7.2 on 10/11.   -- start FeSO4 325 mg every other day   -- ordered Fe sat % with labs next week     Major Recurrent Depression  Suicidal Ideation, Resolved  Psychiatry consulted during hospitalization. PTA citalopram discontinued due to prolonged QT. New on mirtazapine and methylphenidate (latter for energy)  -- mirtazapine 7.5 mg at bedtime  -- methylphenidate 10 mg BID  -- consider having ACP follow at facility  -- supportive cares    Protein Calorie Malnutrition  In setting of critical illness, prolonged hospitalization   -- Prostat daily  -- NG tube cares/monitoring as ordrered  -- nutrition following, calorie counts     HTN  SBPs 100s-110s with limited data. New on amlodipine during hospitalization - ?if Bps elevated due to pain.   -- amlodipine 5 mg daily   -- add hold for SBP <115  -- follow BPs and adjust medications as needed     BPH  -- tamsulosin 0.4 mg daily     Insomnia  -- melatonin 3 mg     Slow Transit Constipation  -- Miralax 17g daily and Senna-S 1 tab daily   -- adjust bowel regimen as needed    Physical Deconditioning  In setting of hospitalization and underlying medical conditions  -- ongoing PT/OT    Electronically signed by:  Isabella Zhao MD                            Sincerely,        Isabella Zhao,  MD

## 2022-10-15 RX ORDER — FERROUS SULFATE 325(65) MG
325 TABLET ORAL EVERY OTHER DAY
COMMUNITY

## 2022-10-17 ENCOUNTER — LAB REQUISITION (OUTPATIENT)
Dept: LAB | Facility: CLINIC | Age: 82
End: 2022-10-17
Payer: MEDICARE

## 2022-10-17 DIAGNOSIS — R79.89 OTHER SPECIFIED ABNORMAL FINDINGS OF BLOOD CHEMISTRY: ICD-10-CM

## 2022-10-18 VITALS
DIASTOLIC BLOOD PRESSURE: 62 MMHG | WEIGHT: 128 LBS | HEIGHT: 68 IN | OXYGEN SATURATION: 98 % | RESPIRATION RATE: 20 BRPM | HEART RATE: 91 BPM | SYSTOLIC BLOOD PRESSURE: 109 MMHG | BODY MASS INDEX: 19.4 KG/M2 | TEMPERATURE: 97.9 F

## 2022-10-18 DIAGNOSIS — G89.4 PAIN SYNDROME, CHRONIC: Primary | ICD-10-CM

## 2022-10-18 PROBLEM — R35.0 INCREASED FREQUENCY OF URINATION: Status: ACTIVE | Noted: 2020-12-30

## 2022-10-18 PROBLEM — R39.198 SLOWING OF URINARY STREAM: Status: ACTIVE | Noted: 2021-07-07

## 2022-10-18 PROBLEM — E83.42 HYPOMAGNESEMIA: Status: ACTIVE | Noted: 2019-06-04

## 2022-10-18 PROBLEM — F11.20 OPIOID DEPENDENCE (H): Status: ACTIVE | Noted: 2017-10-11

## 2022-10-18 PROBLEM — E87.1 ACUTE HYPONATREMIA: Status: ACTIVE | Noted: 2019-06-04

## 2022-10-18 PROBLEM — Z98.1 S/P FUSION OF SACROILIAC JOINT: Status: ACTIVE | Noted: 2022-09-13

## 2022-10-18 PROBLEM — R97.20 RAISED PROSTATE SPECIFIC ANTIGEN: Status: ACTIVE | Noted: 2021-03-10

## 2022-10-18 PROBLEM — N13.8 BENIGN PROSTATIC HYPERPLASIA WITH URINARY OBSTRUCTION: Status: ACTIVE | Noted: 2020-12-30

## 2022-10-18 PROBLEM — M79.89 NECROTIZING SOFT TISSUE INFECTION: Status: ACTIVE | Noted: 2022-09-13

## 2022-10-18 PROBLEM — M51.369 DEGENERATION OF LUMBAR INTERVERTEBRAL DISC: Status: ACTIVE | Noted: 2017-09-05

## 2022-10-18 PROBLEM — G89.29 CHRONIC LOW BACK PAIN: Status: ACTIVE | Noted: 2019-06-04

## 2022-10-18 PROBLEM — N30.00 ACUTE CYSTITIS: Status: ACTIVE | Noted: 2019-06-04

## 2022-10-18 PROBLEM — M54.50 CHRONIC LOW BACK PAIN: Status: ACTIVE | Noted: 2019-06-04

## 2022-10-18 PROBLEM — G89.18 ACUTE POST-OPERATIVE PAIN: Status: ACTIVE | Noted: 2022-09-20

## 2022-10-18 PROBLEM — R39.15 URINARY URGENCY: Status: ACTIVE | Noted: 2021-05-26

## 2022-10-18 PROBLEM — E86.0 LUETSCHER'S SYNDROME: Status: ACTIVE | Noted: 2019-06-04

## 2022-10-18 PROBLEM — N52.9 PRIMARY ERECTILE DYSFUNCTION: Status: ACTIVE | Noted: 2021-10-15

## 2022-10-18 PROBLEM — N40.1 BENIGN PROSTATIC HYPERPLASIA WITH URINARY OBSTRUCTION: Status: ACTIVE | Noted: 2020-12-30

## 2022-10-18 PROBLEM — N17.9 AKI (ACUTE KIDNEY INJURY) (H): Status: ACTIVE | Noted: 2019-06-04

## 2022-10-18 PROBLEM — E87.6 ACUTE HYPOKALEMIA: Status: ACTIVE | Noted: 2019-06-04

## 2022-10-18 PROBLEM — M47.816 LUMBAR SPONDYLOSIS: Status: ACTIVE | Noted: 2017-05-01

## 2022-10-18 PROBLEM — Z79.891 LONG-TERM CURRENT USE OF OPIATE ANALGESIC: Status: ACTIVE | Noted: 2021-03-23

## 2022-10-18 PROBLEM — R35.1 NOCTURIA: Status: ACTIVE | Noted: 2021-07-07

## 2022-10-18 PROBLEM — R41.82 ALTERED MENTAL STATE: Status: ACTIVE | Noted: 2019-06-04

## 2022-10-18 RX ORDER — HYDROMORPHONE HYDROCHLORIDE 2 MG/1
2-4 TABLET ORAL EVERY 4 HOURS PRN
Qty: 30 TABLET | Refills: 0 | Status: SHIPPED | OUTPATIENT
Start: 2022-10-18 | End: 2022-10-24 | Stop reason: DRUGHIGH

## 2022-10-19 ENCOUNTER — TRANSITIONAL CARE UNIT VISIT (OUTPATIENT)
Dept: GERIATRICS | Facility: CLINIC | Age: 82
End: 2022-10-19
Payer: MEDICARE

## 2022-10-19 DIAGNOSIS — M79.89 NECROTIZING SOFT TISSUE INFECTION: ICD-10-CM

## 2022-10-19 DIAGNOSIS — M43.27 FUSION OF LUMBOSACRAL SPINE: ICD-10-CM

## 2022-10-19 DIAGNOSIS — G47.9 SLEEP DISORDER: ICD-10-CM

## 2022-10-19 DIAGNOSIS — I10 BENIGN ESSENTIAL HYPERTENSION: Primary | ICD-10-CM

## 2022-10-19 PROCEDURE — 99310 SBSQ NF CARE HIGH MDM 45: CPT | Performed by: FAMILY MEDICINE

## 2022-10-19 RX ORDER — TRAZODONE HYDROCHLORIDE 50 MG/1
100 TABLET, FILM COATED ORAL AT BEDTIME
COMMUNITY

## 2022-10-19 NOTE — LETTER
10/19/2022        RE: Mayur Patel  [address]  [facility] [address]        M [facility] GERIATRIC SERVICES    Facility:   [facility] ([facility]) [[id number]]   Code Status: FULL CODE      HPI:   Mayur is a 81 year old male who was hospitalized September 13, 2022 through October 12, 2022 secondary to necrotizing soft tissue infection along with a status post fusion of the sacroiliac joint along with sepsis with acute renal failure and septic shock along with acute renal failure, cellulitis of the left lower extremity.  He did have an I&D of the right thigh and muscle and fascia and subcutaneous fat on September 14 and also did have an excision debridement of the right thigh and right gluteus on September 14.  On September 15 he had an I&D right lower extremity and a right gluteal washout.  On September 19 he had an I&D right lower extremity with a wound VAC placement.  October 4 had an I&D of the right lower extremity with right anterior thigh closure, right lateral hip closure, right lateral distal thigh closure and wound VAC application.  He did have a visit with pain service who did put him on Dilaudid and made some various adjustments.  He also had a visit with infectious disease put on ceftriaxone for 6 weeks and do weekly laboratory studies.  Did meet with psychiatry they did recommend Ritalin 10 mg twice daily for motivation and energy.  They also gave him melatonin 3 mg at bedtime as well as mirtazapine 7.5 mg, Celexa 20 mg.  He did have palliative care.  According to orthopedics he is weightbearing as tolerated.  His laboratory studies on September 13 did show a hemoglobin of 11.2 and sodium 134 potassium 3.8, BUN 64 and creatinine 1.64.  His work-up on September 15 did show abdominal x-ray which did show endotracheal tube and he did have air and nondilated loops of the large and small bowel.  Chest x-ray on September 15 did show small right and small to moderate left pleural  effusions but no pneumothorax.  CT of the femur of the right on September 13 did show a poorly circumcised gas and fluid adjacent to the right gluteal musculature as well as the right hip and femur negative for fracture no evidence of osteomyelitis.  Does have additional gas and fluid within the region of the right iliopsoas adjacent to the right hip joint.  Ultrasound bilateral upper extremities was negative for DVT.  He is now currently in the transitional care unit to which we talked about the roles and the goals of the transitional care unit.  His systolic blood pressures ranging  he has been afebrile.  Does have a nasogastric tube.  His weight on 128 pounds in comparison to October 17 also 128 pounds.  He is on a 3-day calorie count.  Has a PICC line in his right bicep.  His bowels are good he is on senna and MiraLAX.  He like to discontinue the lidocaine patch amitriptyline and melatonin.  He states that the Robaxin helps with the pain he also does take Dilaudid as needed.  For sleep he would prefer to be on trazodone 50 mg he has been on that in the past.    Past Medical History:  Past Medical History:   Diagnosis Date     Back pain      BPH (benign prostatic hyperplasia)      Disease of thyroid gland      HTN (hypertension)      Hypertension      Sinusitis      Stroke (H)      Unspecified cerebral artery occlusion with cerebral infarction            Surgical History:  Past Surgical History:   Procedure Laterality Date     BIOPSY PROSTATE NEEDLE / PUNCH / INCISIONAL N/A 3/16/2021    Procedure: TRANSRECTAL ULTRASOUND GUIDED PROSTATE BIOPSY;  Surgeon: Frandy Hathaway MD;  Location: [facility];  Service: Urology     COLONOSCOPY       CYSTOURETHROSCOPY Bilateral 4/26/2021    Procedure: CYSTOURETHROSCOPY, WITH INSERTION OF PERMANENT ADJUSTABLE TRANSPROSTATIC IMPLANT, SINGLE IMPLANT;  Surgeon: Frandy Hathaway MD;  Location: [facility];  Service: Urology     IR CAROTID ANGIOGRAM   "6/14/2009     IR CAROTID ANGIOGRAM  6/14/2009     IR MISCELLANEOUS PROCEDURE  6/14/2009     IR MISCELLANEOUS PROCEDURE  6/14/2009     OPTICAL TRACKING SYSTEM ENDOSCOPIC ENDONASAL SURGERY  2/28/2014    Procedure: OPTICAL TRACKING SYSTEM ENDOSCOPIC ENDONASAL SURGERY;  Stealth Guided Bilateral Inferior Turbinate Reduction, Maxillary Antrostomy And Polypectomy, Maxillary Cyst removal ;  Surgeon: Mandy Lauren MD;  Location: [facility] OR     RHYTIDECTOMY (FACELIFT)         Family History:   Family History   Problem Relation Age of Onset     Substance Abuse Son      Hypertension Mother        Social History:    Social History     Socioeconomic History     Marital status:    Tobacco Use     Smoking status: Former     Types: Cigarettes     Smokeless tobacco: Never   Substance and Sexual Activity     Alcohol use: Yes     Comment: Alcoholic Drinks/day: Social     Drug use: Never   Social History Narrative    Oral maxillofacial surgeon in [place]         REVIEW OF SYSTEM:  He currently denies any new symptoms of fever cough or cold sore throat postnasal drip allergies shortness of breath dyspnea wheezing coughing chest pain dizziness vertigo nausea vomiting diarrhea dysuria frequency urgency headaches or unusual myalgias or arthralgias.    PHYSICAL EXAM:   Pleasant gentleman in no acute distress.  Head is normocephalic.  Conjunctiva is pink and sclerae is clear.  Neck is supple without adenopathy.  Does have an NG tube.  Lung sounds are clear throughout.  Cardiovascular S1 is 2 regular rate and rhythm and no lower extremity edema.  Gastrointestinal soft and nontender with positive bowel sounds.  Musculoskeletal currently in bed.  Thin build.  Psychiatric: Pleasant affect.    Vitals:    10/18/22 1827   BP: 109/62   Pulse: 91   Resp: 20   Temp: 97.9  F (36.6  C)   SpO2: 98%   Weight: 58.1 kg (128 lb)   Height: 1.727 m (5' 8\")         Medication List:  Current Outpatient Medications   Medication Sig     traZODone (DESYREL) 50 " MG tablet Take 50 mg by mouth At Bedtime     acetaminophen (TYLENOL) 500 MG tablet Take 1,000 mg by mouth 3 times daily     amLODIPine (NORVASC) 5 MG tablet Take 5 mg by mouth daily     calcium carbonate (TUMS) 500 MG chewable tablet Take 2 chew tab by mouth 3 times daily as needed for heartburn     cefTRIAXone sodium 2 g SOLR Inject 2 g into the vein every 24 hours Last dose 10/31/22     ferrous sulfate (FEROSUL) 325 (65 Fe) MG tablet Take 325 mg by mouth every other day     gabapentin (NEURONTIN) 100 MG capsule Take 200 mg by mouth 3 times daily     HYDROmorphone (DILAUDID) 2 MG tablet Take 1-2 tablets (2-4 mg) by mouth every 4 hours as needed for severe pain     hydrOXYzine (ATARAX) 25 MG tablet Take 25 mg by mouth every 6 hours as needed for anxiety     methocarbamol (ROBAXIN) 500 MG tablet Take 1,000 mg by mouth 4 times daily as needed for muscle spasms     methylphenidate (RITALIN) 10 MG tablet Take 10 mg by mouth 2 times daily     mirtazapine (REMERON) 7.5 MG tablet Take 7.5 mg by mouth At Bedtime     multivitamin, therapeutic (THERA-VIT) TABS tablet Take 1 tablet by mouth daily     nystatin (MYCOSTATIN) 583299 UNIT/GM external powder Apply topically 2 times daily To groin     polyethylene glycol (MIRALAX) 17 g packet Take 1 packet by mouth daily     Protein (PROSOURCE PO) Take 90 mLs by mouth daily Mix with 120 ml water     senna-docusate (SENOKOT-S/PERICOLACE) 8.6-50 MG tablet Take 1 tablet by mouth daily     tamsulosin (FLOMAX) 0.4 MG capsule Take 1 capsule (0.4 mg) by mouth 2 times daily     vitamin C (ASCORBIC ACID) 250 MG tablet Take 250 mg by mouth daily     vitamin D2 (ERGOCALCIFEROL) 04323 units (1250 mcg) capsule Take 50,000 Units by mouth once a week On Fridays     zinc Oxide (DESITIN) 40 % paste Apply topically 4 times daily as needed for dry skin or irritation Apply as needed for perianal irritation after BM     No current facility-administered medications for this visit.        Labs:  Last  Comprehensive Metabolic Panel:  Sodium   Date Value Ref Range Status   08/23/2022 137 136 - 145 mmol/L Final     Potassium   Date Value Ref Range Status   08/23/2022 4.8 3.4 - 5.3 mmol/L Final   03/09/2022 4.5 3.5 - 5.0 mmol/L Final   02/28/2014 4.8 3.4 - 5.3 mmol/L Final     Chloride   Date Value Ref Range Status   08/23/2022 101 98 - 107 mmol/L Final   09/15/2021 101 98 - 107 mmol/L Final     Carbon Dioxide (CO2)   Date Value Ref Range Status   08/23/2022 26 22 - 29 mmol/L Final   09/15/2021 21 (L) 22 - 31 mmol/L Final     Anion Gap   Date Value Ref Range Status   08/23/2022 10 7 - 15 mmol/L Final   09/15/2021 14 5 - 18 mmol/L Final     Glucose   Date Value Ref Range Status   08/23/2022 83 70 - 99 mg/dL Final   09/15/2021 99 70 - 125 mg/dL Final     Urea Nitrogen   Date Value Ref Range Status   08/23/2022 12.3 8.0 - 23.0 mg/dL Final   09/15/2021 19 8 - 28 mg/dL Final     Creatinine   Date Value Ref Range Status   08/23/2022 1.09 0.67 - 1.17 mg/dL Final   02/28/2014 1.20 0.66 - 1.25 mg/dL Final     GFR Estimate   Date Value Ref Range Status   08/23/2022 69 >60 mL/min/1.73m2 Final     Comment:     Effective December 21, 2021 eGFRcr in adults is calculated using the 2021 CKD-EPI creatinine equation which includes age and gender (Cordell et al., NEJ, DOI: 10.1056/HJISja0327760)   03/08/2021 45 (L) >60 mL/min/1.73m2 Final   02/28/2014 60 (L) >60 mL/min/1.7m2 Final     Calcium   Date Value Ref Range Status   08/23/2022 9.1 8.8 - 10.2 mg/dL Final     CBC RESULTS: Recent Labs   Lab Test 08/23/22  1442   WBC 5.4   RBC 3.32*   HGB 12.2*   HCT 36.4*   *   MCH 36.7*   MCHC 33.5   RDW 13.8            Assessment:   (I10) Benign essential hypertension  (primary encounter diagnosis)  Comment: Continue to manage and follow currently stable  Plan: Currently stable    (G47.9) Sleep disorder  Comment: He like to discontinue the melatonin and amitriptyline and put him back on the trazodone  Plan: Trazodone 50 mg at  bedtime    (M43.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  Weekly laboratory studies.      PLAN:    Talked about the rehabilitation component and all the current medications that he is taking including the laboratory studies.  Per his request he like to discontinue lidocaine patch amitriptyline and melatonin instead he like to be on trazodone 50 mg.  Has been on that in the past and seems to work.  The Robaxin he feels is also effective he is also on scheduled Tylenol 3 times daily also does have Dilaudid anywhere from 1-4 doses per day as needed.  He is on ceftriaxone for IV antibiotics until October 31.  Is also on Neurontin for neuropathy.    For documentation purposes total visit 35 minutes which over 50% was spent with the patient going over his care, medications, hospitalization, adjustments in his medications, the rehabilitation component and nutrition.      Electronically signed by: Jasmeet Porras NP          Sincerely,        Jasmeet Porras NP

## 2022-10-19 NOTE — PROGRESS NOTES
[facility] GERIATRIC SERVICES    Facility:   [facility] ([facility]) [[id number]]   Code Status: FULL CODE      HPI:   Mayur is a 81 year old male who was hospitalized September 13, 2022 through October 12, 2022 secondary to necrotizing soft tissue infection along with a status post fusion of the sacroiliac joint along with sepsis with acute renal failure and septic shock along with acute renal failure, cellulitis of the left lower extremity.  He did have an I&D of the right thigh and muscle and fascia and subcutaneous fat on September 14 and also did have an excision debridement of the right thigh and right gluteus on September 14.  On September 15 he had an I&D right lower extremity and a right gluteal washout.  On September 19 he had an I&D right lower extremity with a wound VAC placement.  October 4 had an I&D of the right lower extremity with right anterior thigh closure, right lateral hip closure, right lateral distal thigh closure and wound VAC application.  He did have a visit with pain service who did put him on Dilaudid and made some various adjustments.  He also had a visit with infectious disease put on ceftriaxone for 6 weeks and do weekly laboratory studies.  Did meet with psychiatry they did recommend Ritalin 10 mg twice daily for motivation and energy.  They also gave him melatonin 3 mg at bedtime as well as mirtazapine 7.5 mg, Celexa 20 mg.  He did have palliative care.  According to orthopedics he is weightbearing as tolerated.  His laboratory studies on September 13 did show a hemoglobin of 11.2 and sodium 134 potassium 3.8, BUN 64 and creatinine 1.64.  His work-up on September 15 did show abdominal x-ray which did show endotracheal tube and he did have air and nondilated loops of the large and small bowel.  Chest x-ray on September 15 did show small right and small to moderate left pleural effusions but no pneumothorax.  CT of the femur of the right on September 13 did show a poorly circumcised  gas and fluid adjacent to the right gluteal musculature as well as the right hip and femur negative for fracture no evidence of osteomyelitis.  Does have additional gas and fluid within the region of the right iliopsoas adjacent to the right hip joint.  Ultrasound bilateral upper extremities was negative for DVT.  He is now currently in the transitional care unit to which we talked about the roles and the goals of the transitional care unit.  His systolic blood pressures ranging  he has been afebrile.  Does have a nasogastric tube.  His weight on 128 pounds in comparison to October 17 also 128 pounds.  He is on a 3-day calorie count.  Has a PICC line in his right bicep.  His bowels are good he is on senna and MiraLAX.  He like to discontinue the lidocaine patch amitriptyline and melatonin.  He states that the Robaxin helps with the pain he also does take Dilaudid as needed.  For sleep he would prefer to be on trazodone 50 mg he has been on that in the past.    Past Medical History:  Past Medical History:   Diagnosis Date     Back pain      BPH (benign prostatic hyperplasia)      Disease of thyroid gland      HTN (hypertension)      Hypertension      Sinusitis      Stroke (H)      Unspecified cerebral artery occlusion with cerebral infarction            Surgical History:  Past Surgical History:   Procedure Laterality Date     BIOPSY PROSTATE NEEDLE / PUNCH / INCISIONAL N/A 3/16/2021    Procedure: TRANSRECTAL ULTRASOUND GUIDED PROSTATE BIOPSY;  Surgeon: Frandy Hathaway MD;  Location: [facility];  Service: Urology     COLONOSCOPY       CYSTOURETHROSCOPY Bilateral 4/26/2021    Procedure: CYSTOURETHROSCOPY, WITH INSERTION OF PERMANENT ADJUSTABLE TRANSPROSTATIC IMPLANT, SINGLE IMPLANT;  Surgeon: Frandy Hathaway MD;  Location: [facility] [place];  Service: Urology     IR CAROTID ANGIOGRAM  6/14/2009     IR CAROTID ANGIOGRAM  6/14/2009     IR MISCELLANEOUS PROCEDURE  6/14/2009     IR MISCELLANEOUS  "PROCEDURE  6/14/2009     OPTICAL TRACKING SYSTEM ENDOSCOPIC ENDONASAL SURGERY  2/28/2014    Procedure: OPTICAL TRACKING SYSTEM ENDOSCOPIC ENDONASAL SURGERY;  Stealth Guided Bilateral Inferior Turbinate Reduction, Maxillary Antrostomy And Polypectomy, Maxillary Cyst removal ;  Surgeon: Mandy Lauren MD;  Location: [facility] OR     RHYTIDECTOMY (FACELIFT)         Family History:   Family History   Problem Relation Age of Onset     Substance Abuse Son      Hypertension Mother        Social History:    Social History     Socioeconomic History     Marital status:    Tobacco Use     Smoking status: Former     Types: Cigarettes     Smokeless tobacco: Never   Substance and Sexual Activity     Alcohol use: Yes     Comment: Alcoholic Drinks/day: Social     Drug use: Never   Social History Narrative    Oral maxillofacial surgeon in [place]         REVIEW OF SYSTEM:  He currently denies any new symptoms of fever cough or cold sore throat postnasal drip allergies shortness of breath dyspnea wheezing coughing chest pain dizziness vertigo nausea vomiting diarrhea dysuria frequency urgency headaches or unusual myalgias or arthralgias.    PHYSICAL EXAM:   Pleasant gentleman in no acute distress.  Head is normocephalic.  Conjunctiva is pink and sclerae is clear.  Neck is supple without adenopathy.  Does have an NG tube.  Lung sounds are clear throughout.  Cardiovascular S1 is 2 regular rate and rhythm and no lower extremity edema.  Gastrointestinal soft and nontender with positive bowel sounds.  Musculoskeletal currently in bed.  Thin build.  Psychiatric: Pleasant affect.    Vitals:    10/18/22 1827   BP: 109/62   Pulse: 91   Resp: 20   Temp: 97.9  F (36.6  C)   SpO2: 98%   Weight: 58.1 kg (128 lb)   Height: 1.727 m (5' 8\")         Medication List:  Current Outpatient Medications   Medication Sig     traZODone (DESYREL) 50 MG tablet Take 50 mg by mouth At Bedtime     acetaminophen (TYLENOL) 500 MG tablet Take 1,000 mg by mouth 3 " times daily     amLODIPine (NORVASC) 5 MG tablet Take 5 mg by mouth daily     calcium carbonate (TUMS) 500 MG chewable tablet Take 2 chew tab by mouth 3 times daily as needed for heartburn     cefTRIAXone sodium 2 g SOLR Inject 2 g into the vein every 24 hours Last dose 10/31/22     ferrous sulfate (FEROSUL) 325 (65 Fe) MG tablet Take 325 mg by mouth every other day     gabapentin (NEURONTIN) 100 MG capsule Take 200 mg by mouth 3 times daily     HYDROmorphone (DILAUDID) 2 MG tablet Take 1-2 tablets (2-4 mg) by mouth every 4 hours as needed for severe pain     hydrOXYzine (ATARAX) 25 MG tablet Take 25 mg by mouth every 6 hours as needed for anxiety     methocarbamol (ROBAXIN) 500 MG tablet Take 1,000 mg by mouth 4 times daily as needed for muscle spasms     methylphenidate (RITALIN) 10 MG tablet Take 10 mg by mouth 2 times daily     mirtazapine (REMERON) 7.5 MG tablet Take 7.5 mg by mouth At Bedtime     multivitamin, therapeutic (THERA-VIT) TABS tablet Take 1 tablet by mouth daily     nystatin (MYCOSTATIN) 273624 UNIT/GM external powder Apply topically 2 times daily To groin     polyethylene glycol (MIRALAX) 17 g packet Take 1 packet by mouth daily     Protein (PROSOURCE PO) Take 90 mLs by mouth daily Mix with 120 ml water     senna-docusate (SENOKOT-S/PERICOLACE) 8.6-50 MG tablet Take 1 tablet by mouth daily     tamsulosin (FLOMAX) 0.4 MG capsule Take 1 capsule (0.4 mg) by mouth 2 times daily     vitamin C (ASCORBIC ACID) 250 MG tablet Take 250 mg by mouth daily     vitamin D2 (ERGOCALCIFEROL) 61980 units (1250 mcg) capsule Take 50,000 Units by mouth once a week On Fridays     zinc Oxide (DESITIN) 40 % paste Apply topically 4 times daily as needed for dry skin or irritation Apply as needed for perianal irritation after BM     No current facility-administered medications for this visit.        Labs:  Last Comprehensive Metabolic Panel:  Sodium   Date Value Ref Range Status   08/23/2022 137 136 - 145 mmol/L Final      Potassium   Date Value Ref Range Status   08/23/2022 4.8 3.4 - 5.3 mmol/L Final   03/09/2022 4.5 3.5 - 5.0 mmol/L Final   02/28/2014 4.8 3.4 - 5.3 mmol/L Final     Chloride   Date Value Ref Range Status   08/23/2022 101 98 - 107 mmol/L Final   09/15/2021 101 98 - 107 mmol/L Final     Carbon Dioxide (CO2)   Date Value Ref Range Status   08/23/2022 26 22 - 29 mmol/L Final   09/15/2021 21 (L) 22 - 31 mmol/L Final     Anion Gap   Date Value Ref Range Status   08/23/2022 10 7 - 15 mmol/L Final   09/15/2021 14 5 - 18 mmol/L Final     Glucose   Date Value Ref Range Status   08/23/2022 83 70 - 99 mg/dL Final   09/15/2021 99 70 - 125 mg/dL Final     Urea Nitrogen   Date Value Ref Range Status   08/23/2022 12.3 8.0 - 23.0 mg/dL Final   09/15/2021 19 8 - 28 mg/dL Final     Creatinine   Date Value Ref Range Status   08/23/2022 1.09 0.67 - 1.17 mg/dL Final   02/28/2014 1.20 0.66 - 1.25 mg/dL Final     GFR Estimate   Date Value Ref Range Status   08/23/2022 69 >60 mL/min/1.73m2 Final     Comment:     Effective December 21, 2021 eGFRcr in adults is calculated using the 2021 CKD-EPI creatinine equation which includes age and gender (Cordell et al., NE, DOI: 10.1056/PMIUzo5680545)   03/08/2021 45 (L) >60 mL/min/1.73m2 Final   02/28/2014 60 (L) >60 mL/min/1.7m2 Final     Calcium   Date Value Ref Range Status   08/23/2022 9.1 8.8 - 10.2 mg/dL Final     CBC RESULTS: Recent Labs   Lab Test 08/23/22  1442   WBC 5.4   RBC 3.32*   HGB 12.2*   HCT 36.4*   *   MCH 36.7*   MCHC 33.5   RDW 13.8            Assessment:   (I10) Benign essential hypertension  (primary encounter diagnosis)  Comment: Continue to manage and follow currently stable  Plan: Currently stable    (G47.9) Sleep disorder  Comment: He like to discontinue the melatonin and amitriptyline and put him back on the trazodone  Plan: Trazodone 50 mg at bedtime    (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  Weekly laboratory studies.      PLAN:    Talked about the rehabilitation component and all the current medications that he is taking including the laboratory studies.  Per his request he like to discontinue lidocaine patch amitriptyline and melatonin instead he like to be on trazodone 50 mg.  Has been on that in the past and seems to work.  The Robaxin he feels is also effective he is also on scheduled Tylenol 3 times daily also does have Dilaudid anywhere from 1-4 doses per day as needed.  He is on ceftriaxone for IV antibiotics until October 31.  Is also on Neurontin for neuropathy.    For documentation purposes total visit 35 minutes which over 50% was spent with the patient going over his care, medications, hospitalization, adjustments in his medications, the rehabilitation component and nutrition.      Electronically signed by: Jasmeet Porras NP

## 2022-10-20 ENCOUNTER — LAB REQUISITION (OUTPATIENT)
Dept: LAB | Facility: CLINIC | Age: 82
End: 2022-10-20
Payer: MEDICARE

## 2022-10-20 ENCOUNTER — TELEPHONE (OUTPATIENT)
Dept: GERIATRICS | Facility: CLINIC | Age: 82
End: 2022-10-20

## 2022-10-20 DIAGNOSIS — A41.9 SEPSIS, UNSPECIFIED ORGANISM (H): ICD-10-CM

## 2022-10-20 LAB
ALP SERPL-CCNC: 127 U/L (ref 40–129)
ALT SERPL W P-5'-P-CCNC: 102 U/L (ref 10–50)
AST SERPL W P-5'-P-CCNC: 65 U/L (ref 10–50)
BASOPHILS # BLD AUTO: 0 10E3/UL (ref 0–0.2)
BASOPHILS NFR BLD AUTO: 1 %
BILIRUB SERPL-MCNC: 0.2 MG/DL
BUN SERPL-MCNC: 32.3 MG/DL (ref 8–23)
CREAT SERPL-MCNC: 0.71 MG/DL (ref 0.67–1.17)
CRP SERPL-MCNC: 53.7 MG/L
EOSINOPHIL # BLD AUTO: 0.7 10E3/UL (ref 0–0.7)
EOSINOPHIL NFR BLD AUTO: 12 %
ERYTHROCYTE [DISTWIDTH] IN BLOOD BY AUTOMATED COUNT: 16.2 % (ref 10–15)
GFR SERPL CREATININE-BSD FRML MDRD: >90 ML/MIN/1.73M2
HCT VFR BLD AUTO: 26.8 % (ref 40–53)
HGB BLD-MCNC: 8.4 G/DL (ref 13.3–17.7)
IMM GRANULOCYTES # BLD: 0.1 10E3/UL
IMM GRANULOCYTES NFR BLD: 1 %
LYMPHOCYTES # BLD AUTO: 0.7 10E3/UL (ref 0.8–5.3)
LYMPHOCYTES NFR BLD AUTO: 12 %
MCH RBC QN AUTO: 29.1 PG (ref 26.5–33)
MCHC RBC AUTO-ENTMCNC: 31.3 G/DL (ref 31.5–36.5)
MCV RBC AUTO: 93 FL (ref 78–100)
MONOCYTES # BLD AUTO: 0.4 10E3/UL (ref 0–1.3)
MONOCYTES NFR BLD AUTO: 8 %
NEUTROPHILS # BLD AUTO: 3.9 10E3/UL (ref 1.6–8.3)
NEUTROPHILS NFR BLD AUTO: 66 %
NRBC # BLD AUTO: 0 10E3/UL
NRBC BLD AUTO-RTO: 0 /100
PLATELET # BLD AUTO: 410 10E3/UL (ref 150–450)
POTASSIUM SERPL-SCNC: 5.9 MMOL/L (ref 3.4–5.3)
RBC # BLD AUTO: 2.89 10E6/UL (ref 4.4–5.9)
WBC # BLD AUTO: 5.9 10E3/UL (ref 4–11)

## 2022-10-20 PROCEDURE — 36415 COLL VENOUS BLD VENIPUNCTURE: CPT | Performed by: INTERNAL MEDICINE

## 2022-10-20 PROCEDURE — 82565 ASSAY OF CREATININE: CPT | Performed by: INTERNAL MEDICINE

## 2022-10-20 PROCEDURE — 84075 ASSAY ALKALINE PHOSPHATASE: CPT | Performed by: INTERNAL MEDICINE

## 2022-10-20 PROCEDURE — 84520 ASSAY OF UREA NITROGEN: CPT | Performed by: INTERNAL MEDICINE

## 2022-10-20 PROCEDURE — 85025 COMPLETE CBC W/AUTO DIFF WBC: CPT | Performed by: INTERNAL MEDICINE

## 2022-10-20 PROCEDURE — 86140 C-REACTIVE PROTEIN: CPT | Performed by: INTERNAL MEDICINE

## 2022-10-20 PROCEDURE — 84450 TRANSFERASE (AST) (SGOT): CPT | Performed by: INTERNAL MEDICINE

## 2022-10-20 PROCEDURE — P9604 ONE-WAY ALLOW PRORATED TRIP: HCPCS | Performed by: INTERNAL MEDICINE

## 2022-10-20 PROCEDURE — 82247 BILIRUBIN TOTAL: CPT | Performed by: INTERNAL MEDICINE

## 2022-10-20 PROCEDURE — 84460 ALANINE AMINO (ALT) (SGPT): CPT | Performed by: INTERNAL MEDICINE

## 2022-10-20 PROCEDURE — 84132 ASSAY OF SERUM POTASSIUM: CPT | Performed by: INTERNAL MEDICINE

## 2022-10-20 NOTE — TELEPHONE ENCOUNTER
[facility] Geriatrics Lab Note     Provider: DAVID Grissom  Facility: [facility]  Facility Type:  TCU    Allergies   Allergen Reactions     Penicillins Hives, Itching and Rash       Labs Reviewed by provider: Heme 1, CMP, CRP     Verbal Order/Direction given by Provider: Kayexalate 15g x 1 now.  Check potassium level on 10/21/22.  Fax lab results to ID.      Provider giving Order:  DAVID Grissom    Verbal Order given to: Preet([phone])    Jah Alonso RN   None

## 2022-10-21 ENCOUNTER — LAB REQUISITION (OUTPATIENT)
Dept: LAB | Facility: CLINIC | Age: 82
End: 2022-10-21
Payer: MEDICARE

## 2022-10-21 DIAGNOSIS — R53.83 TIREDNESS: Primary | ICD-10-CM

## 2022-10-21 DIAGNOSIS — A41.9 SEPSIS, UNSPECIFIED ORGANISM (H): ICD-10-CM

## 2022-10-21 DIAGNOSIS — G89.4 PAIN SYNDROME, CHRONIC: ICD-10-CM

## 2022-10-21 RX ORDER — METHYLPHENIDATE HYDROCHLORIDE 10 MG/1
10 TABLET ORAL 2 TIMES DAILY
Qty: 15 TABLET | Refills: 0 | Status: SHIPPED | OUTPATIENT
Start: 2022-10-21 | End: 2022-10-28

## 2022-10-22 ENCOUNTER — HEALTH MAINTENANCE LETTER (OUTPATIENT)
Age: 82
End: 2022-10-22

## 2022-10-22 ENCOUNTER — LAB REQUISITION (OUTPATIENT)
Dept: LAB | Facility: CLINIC | Age: 82
End: 2022-10-22
Payer: MEDICARE

## 2022-10-22 DIAGNOSIS — A41.9 SEPSIS, UNSPECIFIED ORGANISM (H): ICD-10-CM

## 2022-10-23 ENCOUNTER — LAB REQUISITION (OUTPATIENT)
Dept: LAB | Facility: CLINIC | Age: 82
End: 2022-10-23
Payer: MEDICARE

## 2022-10-23 ENCOUNTER — TELEPHONE (OUTPATIENT)
Dept: GERIATRICS | Facility: CLINIC | Age: 82
End: 2022-10-23

## 2022-10-23 DIAGNOSIS — E87.5 HYPERKALEMIA: ICD-10-CM

## 2022-10-23 DIAGNOSIS — E87.5 HYPERKALEMIA: Primary | ICD-10-CM

## 2022-10-23 LAB
ALP SERPL-CCNC: 120 U/L (ref 40–129)
ALT SERPL W P-5'-P-CCNC: 99 U/L (ref 10–50)
AST SERPL W P-5'-P-CCNC: 62 U/L (ref 10–50)
POTASSIUM SERPL-SCNC: 5.5 MMOL/L (ref 3.4–5.3)

## 2022-10-23 PROCEDURE — 84075 ASSAY ALKALINE PHOSPHATASE: CPT | Performed by: INTERNAL MEDICINE

## 2022-10-23 PROCEDURE — 36415 COLL VENOUS BLD VENIPUNCTURE: CPT | Performed by: FAMILY MEDICINE

## 2022-10-23 PROCEDURE — 84132 ASSAY OF SERUM POTASSIUM: CPT | Performed by: FAMILY MEDICINE

## 2022-10-23 PROCEDURE — 84450 TRANSFERASE (AST) (SGOT): CPT | Performed by: INTERNAL MEDICINE

## 2022-10-23 PROCEDURE — 84460 ALANINE AMINO (ALT) (SGPT): CPT | Performed by: INTERNAL MEDICINE

## 2022-10-23 RX ORDER — SODIUM POLYSTYRENE SULFONATE 15 G/60ML
15 SUSPENSION ORAL; RECTAL ONCE
Qty: 60 ML | Refills: 0 | Status: SHIPPED | OUTPATIENT
Start: 2022-10-23 | End: 2022-10-23

## 2022-10-23 NOTE — TELEPHONE ENCOUNTER
[facility] GERIATRIC SERVICES TELEPHONE ENCOUNTER    Chief Complaint   Patient presents with     Results       Mayur Patel is a 81 year old  (1940),Nurse called today to report: Labs results today reveal 5.5. Previous potassium level on 10/20/22 was 5.9. kayexalate was ordered on 10/20 with labs recheck due 10/21/22 however he refused lab collection. Potassium levels today 5.5. ALT 99. AST 62. Alkaline phosphate 120. Unknown if he received kayexalate dose 10/20/22 per Registered Nurse.     ASSESSMENT/PLAN      kayexalate 15gm once    Potassium recheck tomorrow 10/24/22    Electronically signed by:   IFRAH Fontaine CNP

## 2022-10-24 ENCOUNTER — DOCUMENTATION ONLY (OUTPATIENT)
Dept: GERIATRICS | Facility: CLINIC | Age: 82
End: 2022-10-24

## 2022-10-24 ENCOUNTER — LAB REQUISITION (OUTPATIENT)
Dept: LAB | Facility: CLINIC | Age: 82
End: 2022-10-24
Payer: MEDICARE

## 2022-10-24 DIAGNOSIS — R79.89 OTHER SPECIFIED ABNORMAL FINDINGS OF BLOOD CHEMISTRY: ICD-10-CM

## 2022-10-24 DIAGNOSIS — R52 SEVERE PAIN: Primary | ICD-10-CM

## 2022-10-24 DIAGNOSIS — E87.5 HYPERKALEMIA: ICD-10-CM

## 2022-10-24 RX ORDER — HYDROMORPHONE HYDROCHLORIDE 2 MG/1
2 TABLET ORAL EVERY 4 HOURS PRN
COMMUNITY
End: 2022-10-24

## 2022-10-24 RX ORDER — HYDROMORPHONE HYDROCHLORIDE 2 MG/1
2 TABLET ORAL EVERY 4 HOURS PRN
Qty: 30 TABLET | Refills: 0 | Status: CANCELLED | OUTPATIENT
Start: 2022-10-24

## 2022-10-24 RX ORDER — HYDROMORPHONE HYDROCHLORIDE 2 MG/1
2 TABLET ORAL EVERY 4 HOURS PRN
Qty: 14 TABLET | Refills: 0 | Status: SHIPPED | OUTPATIENT
Start: 2022-10-24 | End: 2022-10-28

## 2022-10-25 VITALS
HEIGHT: 68 IN | DIASTOLIC BLOOD PRESSURE: 71 MMHG | SYSTOLIC BLOOD PRESSURE: 111 MMHG | OXYGEN SATURATION: 97 % | HEART RATE: 86 BPM | WEIGHT: 123.2 LBS | TEMPERATURE: 98.1 F | RESPIRATION RATE: 18 BRPM | BODY MASS INDEX: 18.67 KG/M2

## 2022-10-25 LAB — POTASSIUM SERPL-SCNC: 4.7 MMOL/L (ref 3.4–5.3)

## 2022-10-25 PROCEDURE — 36415 COLL VENOUS BLD VENIPUNCTURE: CPT | Performed by: INTERNAL MEDICINE

## 2022-10-25 PROCEDURE — P9604 ONE-WAY ALLOW PRORATED TRIP: HCPCS | Performed by: INTERNAL MEDICINE

## 2022-10-25 PROCEDURE — 84132 ASSAY OF SERUM POTASSIUM: CPT | Performed by: INTERNAL MEDICINE

## 2022-10-26 ENCOUNTER — TRANSITIONAL CARE UNIT VISIT (OUTPATIENT)
Dept: GERIATRICS | Facility: CLINIC | Age: 82
End: 2022-10-26
Payer: MEDICARE

## 2022-10-26 DIAGNOSIS — E46 PROTEIN-CALORIE MALNUTRITION, UNSPECIFIED SEVERITY (H): ICD-10-CM

## 2022-10-26 DIAGNOSIS — D64.9 ANEMIA, UNSPECIFIED TYPE: ICD-10-CM

## 2022-10-26 DIAGNOSIS — G47.9 SLEEP DISORDER: Primary | ICD-10-CM

## 2022-10-26 DIAGNOSIS — I10 BENIGN ESSENTIAL HYPERTENSION: ICD-10-CM

## 2022-10-26 PROCEDURE — 99310 SBSQ NF CARE HIGH MDM 45: CPT | Performed by: FAMILY MEDICINE

## 2022-10-26 NOTE — LETTER
10/26/2022        RE: Mayur Patel  [address]  [facility] [address]        [facility] GERIATRIC SERVICES    Facility:   [facility] ([facility]) [[id number]]   Code Status: FULL CODE      CHIEF COMPLAINT/REASON FOR VISIT:  Chief Complaint   Patient presents with     RECHECK       HISTORY:      HPI: Mayur is a 81 year old male Who I had the pleasure revisiting with once again today not only secondary to his hospitalization September 13, 2022 through October 12, 2022 secondary necrotizing soft tissue infection along with a status post fusion of the sacroiliac joint along with sepsis, acute renal failure, septic shock, cellulitis left lower extremity and who also did have an I&D of the right thigh and muscle and fascia and subcutaneous fat on September 14 and then a I&D right lower extremity and gluteal washout on September 15 and on September 19 had an I&D right lower extremity and wound VAC placement and on October 4 had an I&D of the right lower extremity with right anterior thigh closure, right hip lateral closure, right lateral distal thigh closure and wound VAC application along with protein malnutrition, hypertension, depression, and rehabilitation.  Regarding the rehabilitation process he feels like he is picking up the pace pretty well.  He does not have a significant amount of pain he does have Neurontin scheduled along with Tylenol 3 times daily does take Dilaudid as needed did take a dose last on October 25 and prior to that October 2030 also has Robaxin as needed.  Still getting IV antibiotics.  He is all on oral feedings no tube feedings.  His blood pressures ranging  he has been afebrile and also on room air his weight 123 pounds in comparison to October 21 126 pounds.  We will now discontinue the amlodipine 5 mg.  Regarding his sleep he like to increase the trazodone 100 mg.  Last week I did discontinue his melatonin and put him on trazodone 50 mg he states he is doing  better with that.  He also does have laboratory studies including a CMP and hemoglobin on October 27 we will be sent to infectious disease and will monitor his labs as well.  His bowels are regular is on MiraLAX and senna.  Sometimes he feels the boost gives him some loose stool but he is on boost 3 times a day.  He is aware of his vital signs and his weight.  Otherwise he states he is participating in therapy.    Past Medical History:   Diagnosis Date     Back pain      BPH (benign prostatic hyperplasia)      Disease of thyroid gland      HTN (hypertension)      Hypertension      Sinusitis      Stroke (H)      Unspecified cerebral artery occlusion with cerebral infarction             Family History   Problem Relation Age of Onset     Substance Abuse Son      Hypertension Mother       Social History     Socioeconomic History     Marital status:    Tobacco Use     Smoking status: Former     Types: Cigarettes     Smokeless tobacco: Never   Substance and Sexual Activity     Alcohol use: Yes     Comment: Alcoholic Drinks/day: Social     Drug use: Never   Social History Narrative    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    PHYSICAL EXAM:   Pleasant gentleman in no acute distress.  Head is normocephalic.  Conjunctiva is pink and sclerae is clear.  Neck is supple without adenopathy.   Lung sounds are clear throughout.  Cardiovascular S1 is 2 regular rate and rhythm and no lower extremity edema.  Gastrointestinal soft and nontender with positive bowel sounds.  Musculoskeletal currently in bed.  Thin build.  Psychiatric: Pleasant  affect.       Current Outpatient Medications:      acetaminophen (TYLENOL) 500 MG tablet, Take 1,000 mg by mouth 3 times daily, Disp: , Rfl:      calcium carbonate (TUMS) 500 MG chewable tablet, Take 2 chew tab by mouth 3 times daily as needed for heartburn, Disp: , Rfl:      cefTRIAXone sodium 2 g SOLR, Inject 2 g into the vein every 24 hours Last dose 10/31/22, Disp: , Rfl:      ferrous sulfate (FEROSUL) 325 (65 Fe) MG tablet, Take 325 mg by mouth every other day, Disp: , Rfl:      gabapentin (NEURONTIN) 100 MG capsule, Take 200 mg by mouth 3 times daily, Disp: , Rfl:      HYDROmorphone (DILAUDID) 2 MG tablet, Take 1 tablet (2 mg) by mouth every 4 hours as needed for severe pain Take 2 tabs for pain 7-10 Take 1 tab for pain 4-6, Disp: 14 tablet, Rfl: 0     hydrOXYzine (ATARAX) 25 MG tablet, Take 25 mg by mouth every 6 hours as needed for anxiety, Disp: , Rfl:      methocarbamol (ROBAXIN) 500 MG tablet, Take 1,000 mg by mouth 4 times daily as needed for muscle spasms, Disp: , Rfl:      methylphenidate (RITALIN) 10 MG tablet, Take 1 tablet (10 mg) by mouth 2 times daily, Disp: 15 tablet, Rfl: 0     mirtazapine (REMERON) 7.5 MG tablet, Take 7.5 mg by mouth At Bedtime, Disp: , Rfl:      multivitamin, therapeutic (THERA-VIT) TABS tablet, Take 1 tablet by mouth daily, Disp: , Rfl:      nystatin (MYCOSTATIN) 794698 UNIT/GM external powder, Apply topically 2 times daily To groin, Disp: , Rfl:      polyethylene glycol (MIRALAX) 17 g packet, Take 1 packet by mouth daily, Disp: , Rfl:      Protein (PROSOURCE PO), Take 90 mLs by mouth daily Mix with 120 ml water, Disp: , Rfl:      senna-docusate (SENOKOT-S/PERICOLACE) 8.6-50 MG tablet, Take 1 tablet by mouth daily, Disp: , Rfl:      tamsulosin (FLOMAX) 0.4 MG capsule, Take 1 capsule (0.4 mg) by mouth 2 times daily, Disp: 180 capsule, Rfl: 0     traZODone (DESYREL) 50 MG tablet, Take 100 mg by mouth At Bedtime, Disp: , Rfl:      vitamin C (ASCORBIC ACID) 250 MG tablet, Take  "250 mg by mouth daily, Disp: , Rfl:      vitamin D2 (ERGOCALCIFEROL) 80670 units (1250 mcg) capsule, Take 50,000 Units by mouth once a week On Fridays, Disp: , Rfl:      zinc Oxide (DESITIN) 40 % paste, Apply topically 4 times daily as needed for dry skin or irritation Apply as needed for perianal irritation after BM, Disp: , Rfl:     /71   Pulse 86   Temp 98.1  F (36.7  C)   Resp 18   Ht 1.727 m (5' 8\")   Wt 55.9 kg (123 lb 3.2 oz)   SpO2 97%   BMI 18.73 kg/m      LABS:   CBC RESULTS: Recent Labs   Lab Test 10/20/22  0719   WBC 5.9   RBC 2.89*   HGB 8.4*   HCT 26.8*   MCV 93   MCH 29.1   MCHC 31.3*   RDW 16.2*        Last Comprehensive Metabolic Panel:  Sodium   Date Value Ref Range Status   08/23/2022 137 136 - 145 mmol/L Final     Potassium   Date Value Ref Range Status   10/25/2022 4.7 3.4 - 5.3 mmol/L Final   03/09/2022 4.5 3.5 - 5.0 mmol/L Final   02/28/2014 4.8 3.4 - 5.3 mmol/L Final     Chloride   Date Value Ref Range Status   08/23/2022 101 98 - 107 mmol/L Final   09/15/2021 101 98 - 107 mmol/L Final     Carbon Dioxide (CO2)   Date Value Ref Range Status   08/23/2022 26 22 - 29 mmol/L Final   09/15/2021 21 (L) 22 - 31 mmol/L Final     Anion Gap   Date Value Ref Range Status   08/23/2022 10 7 - 15 mmol/L Final   09/15/2021 14 5 - 18 mmol/L Final     Glucose   Date Value Ref Range Status   08/23/2022 83 70 - 99 mg/dL Final   09/15/2021 99 70 - 125 mg/dL Final     Urea Nitrogen   Date Value Ref Range Status   10/20/2022 32.3 (H) 8.0 - 23.0 mg/dL Final   09/15/2021 19 8 - 28 mg/dL Final     Creatinine   Date Value Ref Range Status   10/20/2022 0.71 0.67 - 1.17 mg/dL Final   02/28/2014 1.20 0.66 - 1.25 mg/dL Final     GFR Estimate   Date Value Ref Range Status   10/20/2022 >90 >60 mL/min/1.73m2 Final     Comment:     Effective December 21, 2021 eGFRcr in adults is calculated using the 2021 CKD-EPI creatinine equation which includes age and gender (Cordell et al., NE, DOI: " 10.1056/KURJxw6913867)   03/08/2021 45 (L) >60 mL/min/1.73m2 Final   02/28/2014 60 (L) >60 mL/min/1.7m2 Final     Calcium   Date Value Ref Range Status   08/23/2022 9.1 8.8 - 10.2 mg/dL Final     Last Comprehensive Metabolic Panel:  Sodium   Date Value Ref Range Status   08/23/2022 137 136 - 145 mmol/L Final     Potassium   Date Value Ref Range Status   10/25/2022 4.7 3.4 - 5.3 mmol/L Final   03/09/2022 4.5 3.5 - 5.0 mmol/L Final   02/28/2014 4.8 3.4 - 5.3 mmol/L Final     Chloride   Date Value Ref Range Status   08/23/2022 101 98 - 107 mmol/L Final   09/15/2021 101 98 - 107 mmol/L Final     Carbon Dioxide (CO2)   Date Value Ref Range Status   08/23/2022 26 22 - 29 mmol/L Final   09/15/2021 21 (L) 22 - 31 mmol/L Final     Anion Gap   Date Value Ref Range Status   08/23/2022 10 7 - 15 mmol/L Final   09/15/2021 14 5 - 18 mmol/L Final     Glucose   Date Value Ref Range Status   08/23/2022 83 70 - 99 mg/dL Final   09/15/2021 99 70 - 125 mg/dL Final     Urea Nitrogen   Date Value Ref Range Status   10/20/2022 32.3 (H) 8.0 - 23.0 mg/dL Final   09/15/2021 19 8 - 28 mg/dL Final     Creatinine   Date Value Ref Range Status   10/20/2022 0.71 0.67 - 1.17 mg/dL Final   02/28/2014 1.20 0.66 - 1.25 mg/dL Final     GFR Estimate   Date Value Ref Range Status   10/20/2022 >90 >60 mL/min/1.73m2 Final     Comment:     Effective December 21, 2021 eGFRcr in adults is calculated using the 2021 CKD-EPI creatinine equation which includes age and gender (Cordell martinez al., NEJ, DOI: 10.1056/UNNSjd2490944)   03/08/2021 45 (L) >60 mL/min/1.73m2 Final   02/28/2014 60 (L) >60 mL/min/1.7m2 Final     Calcium   Date Value Ref Range Status   08/23/2022 9.1 8.8 - 10.2 mg/dL Final     Bilirubin Total   Date Value Ref Range Status   10/20/2022 0.2 <=1.2 mg/dL Final     Alkaline Phosphatase   Date Value Ref Range Status   10/23/2022 120 40 - 129 U/L Final     ALT   Date Value Ref Range Status   10/23/2022 99 (H) 10 - 50 U/L Final     AST   Date Value Ref  Range Status   10/23/2022 62 (H) 10 - 50 U/L Final                   ASSESSMENT:    Encounter Diagnoses   Name Primary?     Sleep disorder Yes     Benign essential hypertension      Anemia, unspecified type      Protein-calorie malnutrition, unspecified severity (H)        PLAN:    He does have further laboratory studies on October 27.  He like to increase the trazodone to 100 mg.  We will discontinue the Norvasc due to systolic blood pressures less than 115.  His weight has dropped little bit continue with the extra nutrient supplements.  No longer on NG tube feedings.  Continue with the Rocephin.  The laboratory studies will be sent to infectious disease.  He did not have any other questions or concerns and did appreciate the opportunity to go over his plan of care.        Electronically signed by: Jasmeet Porras NP          Sincerely,        Jasmeet Porras NP

## 2022-10-26 NOTE — PROGRESS NOTES
[facility] GERIATRIC SERVICES    Facility:   [facility] ([facility]) [[id number]]   Code Status: FULL CODE      CHIEF COMPLAINT/REASON FOR VISIT:  Chief Complaint   Patient presents with     RECHECK       HISTORY:      HPI: Mayur is a 81 year old male Who I had the pleasure revisiting with once again today not only secondary to his hospitalization September 13, 2022 through October 12, 2022 secondary necrotizing soft tissue infection along with a status post fusion of the sacroiliac joint along with sepsis, acute renal failure, septic shock, cellulitis left lower extremity and who also did have an I&D of the right thigh and muscle and fascia and subcutaneous fat on September 14 and then a I&D right lower extremity and gluteal washout on September 15 and on September 19 had an I&D right lower extremity and wound VAC placement and on October 4 had an I&D of the right lower extremity with right anterior thigh closure, right hip lateral closure, right lateral distal thigh closure and wound VAC application along with protein malnutrition, hypertension, depression, and rehabilitation.  Regarding the rehabilitation process he feels like he is picking up the pace pretty well.  He does not have a significant amount of pain he does have Neurontin scheduled along with Tylenol 3 times daily does take Dilaudid as needed did take a dose last on October 25 and prior to that October 2030 also has Robaxin as needed.  Still getting IV antibiotics.  He is all on oral feedings no tube feedings.  His blood pressures ranging  he has been afebrile and also on room air his weight 123 pounds in comparison to October 21 126 pounds.  We will now discontinue the amlodipine 5 mg.  Regarding his sleep he like to increase the trazodone 100 mg.  Last week I did discontinue his melatonin and put him on trazodone 50 mg he states he is doing better with that.  He also does have laboratory studies including a CMP and hemoglobin on October 27 we  will be sent to infectious disease and will monitor his labs as well.  His bowels are regular is on MiraLAX and senna.  Sometimes he feels the boost gives him some loose stool but he is on boost 3 times a day.  He is aware of his vital signs and his weight.  Otherwise he states he is participating in therapy.    Past Medical History:   Diagnosis Date     Back pain      BPH (benign prostatic hyperplasia)      Disease of thyroid gland      HTN (hypertension)      Hypertension      Sinusitis      Stroke (H)      Unspecified cerebral artery occlusion with cerebral infarction             Family History   Problem Relation Age of Onset     Substance Abuse Son      Hypertension Mother       Social History     Socioeconomic History     Marital status:    Tobacco Use     Smoking status: Former     Types: Cigarettes     Smokeless tobacco: Never   Substance and Sexual Activity     Alcohol use: Yes     Comment: Alcoholic Drinks/day: Social     Drug use: Never   Social History Narrative    Oral maxillofacial surgeon in [place]       No new changes to the review of systems or physical exam since our last visit on October 19  REVIEW OF SYSTEM:  He currently denies any new symptoms of fever cough or cold sore throat postnasal drip allergies shortness of breath dyspnea wheezing coughing chest pain dizziness vertigo nausea vomiting diarrhea dysuria frequency urgency headaches or unusual myalgias or arthralgias.    PHYSICAL EXAM:   Pleasant gentleman in no acute distress.  Head is normocephalic.  Conjunctiva is pink and sclerae is clear.  Neck is supple without adenopathy.   Lung sounds are clear throughout.  Cardiovascular S1 is 2 regular rate and rhythm and no lower extremity edema.  Gastrointestinal soft and nontender with positive bowel sounds.  Musculoskeletal currently in bed.  Thin build.  Psychiatric: Pleasant affect.       Current Outpatient Medications:      acetaminophen (TYLENOL) 500 MG tablet, Take 1,000 mg by  mouth 3 times daily, Disp: , Rfl:      calcium carbonate (TUMS) 500 MG chewable tablet, Take 2 chew tab by mouth 3 times daily as needed for heartburn, Disp: , Rfl:      cefTRIAXone sodium 2 g SOLR, Inject 2 g into the vein every 24 hours Last dose 10/31/22, Disp: , Rfl:      ferrous sulfate (FEROSUL) 325 (65 Fe) MG tablet, Take 325 mg by mouth every other day, Disp: , Rfl:      gabapentin (NEURONTIN) 100 MG capsule, Take 200 mg by mouth 3 times daily, Disp: , Rfl:      HYDROmorphone (DILAUDID) 2 MG tablet, Take 1 tablet (2 mg) by mouth every 4 hours as needed for severe pain Take 2 tabs for pain 7-10 Take 1 tab for pain 4-6, Disp: 14 tablet, Rfl: 0     hydrOXYzine (ATARAX) 25 MG tablet, Take 25 mg by mouth every 6 hours as needed for anxiety, Disp: , Rfl:      methocarbamol (ROBAXIN) 500 MG tablet, Take 1,000 mg by mouth 4 times daily as needed for muscle spasms, Disp: , Rfl:      methylphenidate (RITALIN) 10 MG tablet, Take 1 tablet (10 mg) by mouth 2 times daily, Disp: 15 tablet, Rfl: 0     mirtazapine (REMERON) 7.5 MG tablet, Take 7.5 mg by mouth At Bedtime, Disp: , Rfl:      multivitamin, therapeutic (THERA-VIT) TABS tablet, Take 1 tablet by mouth daily, Disp: , Rfl:      nystatin (MYCOSTATIN) 241466 UNIT/GM external powder, Apply topically 2 times daily To groin, Disp: , Rfl:      polyethylene glycol (MIRALAX) 17 g packet, Take 1 packet by mouth daily, Disp: , Rfl:      Protein (PROSOURCE PO), Take 90 mLs by mouth daily Mix with 120 ml water, Disp: , Rfl:      senna-docusate (SENOKOT-S/PERICOLACE) 8.6-50 MG tablet, Take 1 tablet by mouth daily, Disp: , Rfl:      tamsulosin (FLOMAX) 0.4 MG capsule, Take 1 capsule (0.4 mg) by mouth 2 times daily, Disp: 180 capsule, Rfl: 0     traZODone (DESYREL) 50 MG tablet, Take 100 mg by mouth At Bedtime, Disp: , Rfl:      vitamin C (ASCORBIC ACID) 250 MG tablet, Take 250 mg by mouth daily, Disp: , Rfl:      vitamin D2 (ERGOCALCIFEROL) 27774 units (1250 mcg) capsule, Take  "50,000 Units by mouth once a week On Fridays, Disp: , Rfl:      zinc Oxide (DESITIN) 40 % paste, Apply topically 4 times daily as needed for dry skin or irritation Apply as needed for perianal irritation after BM, Disp: , Rfl:     /71   Pulse 86   Temp 98.1  F (36.7  C)   Resp 18   Ht 1.727 m (5' 8\")   Wt 55.9 kg (123 lb 3.2 oz)   SpO2 97%   BMI 18.73 kg/m      LABS:   CBC RESULTS: Recent Labs   Lab Test 10/20/22  0719   WBC 5.9   RBC 2.89*   HGB 8.4*   HCT 26.8*   MCV 93   MCH 29.1   MCHC 31.3*   RDW 16.2*        Last Comprehensive Metabolic Panel:  Sodium   Date Value Ref Range Status   08/23/2022 137 136 - 145 mmol/L Final     Potassium   Date Value Ref Range Status   10/25/2022 4.7 3.4 - 5.3 mmol/L Final   03/09/2022 4.5 3.5 - 5.0 mmol/L Final   02/28/2014 4.8 3.4 - 5.3 mmol/L Final     Chloride   Date Value Ref Range Status   08/23/2022 101 98 - 107 mmol/L Final   09/15/2021 101 98 - 107 mmol/L Final     Carbon Dioxide (CO2)   Date Value Ref Range Status   08/23/2022 26 22 - 29 mmol/L Final   09/15/2021 21 (L) 22 - 31 mmol/L Final     Anion Gap   Date Value Ref Range Status   08/23/2022 10 7 - 15 mmol/L Final   09/15/2021 14 5 - 18 mmol/L Final     Glucose   Date Value Ref Range Status   08/23/2022 83 70 - 99 mg/dL Final   09/15/2021 99 70 - 125 mg/dL Final     Urea Nitrogen   Date Value Ref Range Status   10/20/2022 32.3 (H) 8.0 - 23.0 mg/dL Final   09/15/2021 19 8 - 28 mg/dL Final     Creatinine   Date Value Ref Range Status   10/20/2022 0.71 0.67 - 1.17 mg/dL Final   02/28/2014 1.20 0.66 - 1.25 mg/dL Final     GFR Estimate   Date Value Ref Range Status   10/20/2022 >90 >60 mL/min/1.73m2 Final     Comment:     Effective December 21, 2021 eGFRcr in adults is calculated using the 2021 CKD-EPI creatinine equation which includes age and gender (Cordell martinez al., NEJM, DOI: 10.1056/[id number])   03/08/2021 45 (L) >60 mL/min/1.73m2 Final   02/28/2014 60 (L) >60 mL/min/1.7m2 Final     Calcium "   Date Value Ref Range Status   08/23/2022 9.1 8.8 - 10.2 mg/dL Final     Last Comprehensive Metabolic Panel:  Sodium   Date Value Ref Range Status   08/23/2022 137 136 - 145 mmol/L Final     Potassium   Date Value Ref Range Status   10/25/2022 4.7 3.4 - 5.3 mmol/L Final   03/09/2022 4.5 3.5 - 5.0 mmol/L Final   02/28/2014 4.8 3.4 - 5.3 mmol/L Final     Chloride   Date Value Ref Range Status   08/23/2022 101 98 - 107 mmol/L Final   09/15/2021 101 98 - 107 mmol/L Final     Carbon Dioxide (CO2)   Date Value Ref Range Status   08/23/2022 26 22 - 29 mmol/L Final   09/15/2021 21 (L) 22 - 31 mmol/L Final     Anion Gap   Date Value Ref Range Status   08/23/2022 10 7 - 15 mmol/L Final   09/15/2021 14 5 - 18 mmol/L Final     Glucose   Date Value Ref Range Status   08/23/2022 83 70 - 99 mg/dL Final   09/15/2021 99 70 - 125 mg/dL Final     Urea Nitrogen   Date Value Ref Range Status   10/20/2022 32.3 (H) 8.0 - 23.0 mg/dL Final   09/15/2021 19 8 - 28 mg/dL Final     Creatinine   Date Value Ref Range Status   10/20/2022 0.71 0.67 - 1.17 mg/dL Final   02/28/2014 1.20 0.66 - 1.25 mg/dL Final     GFR Estimate   Date Value Ref Range Status   10/20/2022 >90 >60 mL/min/1.73m2 Final     Comment:     Effective December 21, 2021 eGFRcr in adults is calculated using the 2021 CKD-EPI creatinine equation which includes age and gender (Cordell et al., NEJM, DOI: 10.1056/HZKTlo1010236)   03/08/2021 45 (L) >60 mL/min/1.73m2 Final   02/28/2014 60 (L) >60 mL/min/1.7m2 Final     Calcium   Date Value Ref Range Status   08/23/2022 9.1 8.8 - 10.2 mg/dL Final     Bilirubin Total   Date Value Ref Range Status   10/20/2022 0.2 <=1.2 mg/dL Final     Alkaline Phosphatase   Date Value Ref Range Status   10/23/2022 120 40 - 129 U/L Final     ALT   Date Value Ref Range Status   10/23/2022 99 (H) 10 - 50 U/L Final     AST   Date Value Ref Range Status   10/23/2022 62 (H) 10 - 50 U/L Final                   ASSESSMENT:    Encounter Diagnoses   Name Primary?      Sleep disorder Yes     Benign essential hypertension      Anemia, unspecified type      Protein-calorie malnutrition, unspecified severity (H)        PLAN:    He does have further laboratory studies on October 27.  He like to increase the trazodone to 100 mg.  We will discontinue the Norvasc due to systolic blood pressures less than 115.  His weight has dropped little bit continue with the extra nutrient supplements.  No longer on NG tube feedings.  Continue with the Rocephin.  The laboratory studies will be sent to infectious disease.  He did not have any other questions or concerns and did appreciate the opportunity to go over his plan of care.        Electronically signed by: Jasmeet Porras NP

## 2022-10-27 LAB
ALP SERPL-CCNC: 128 U/L (ref 40–129)
ALT SERPL W P-5'-P-CCNC: 152 U/L (ref 10–50)
BASOPHILS # BLD AUTO: 0 10E3/UL (ref 0–0.2)
BASOPHILS NFR BLD AUTO: 1 %
BILIRUB SERPL-MCNC: 0.2 MG/DL
BUN SERPL-MCNC: 27 MG/DL (ref 8–23)
CREAT SERPL-MCNC: 0.71 MG/DL (ref 0.67–1.17)
CRP SERPL-MCNC: 12.9 MG/L
EOSINOPHIL # BLD AUTO: 0.5 10E3/UL (ref 0–0.7)
EOSINOPHIL NFR BLD AUTO: 8 %
ERYTHROCYTE [DISTWIDTH] IN BLOOD BY AUTOMATED COUNT: 16.8 % (ref 10–15)
GFR SERPL CREATININE-BSD FRML MDRD: >90 ML/MIN/1.73M2
HCT VFR BLD AUTO: 28.5 % (ref 40–53)
HGB BLD-MCNC: 8.5 G/DL (ref 13.3–17.7)
IMM GRANULOCYTES # BLD: 0.1 10E3/UL
IMM GRANULOCYTES NFR BLD: 2 %
LYMPHOCYTES # BLD AUTO: 0.7 10E3/UL (ref 0.8–5.3)
LYMPHOCYTES NFR BLD AUTO: 13 %
MCH RBC QN AUTO: 28.5 PG (ref 26.5–33)
MCHC RBC AUTO-ENTMCNC: 29.8 G/DL (ref 31.5–36.5)
MCV RBC AUTO: 96 FL (ref 78–100)
MONOCYTES # BLD AUTO: 0.5 10E3/UL (ref 0–1.3)
MONOCYTES NFR BLD AUTO: 10 %
NEUTROPHILS # BLD AUTO: 3.7 10E3/UL (ref 1.6–8.3)
NEUTROPHILS NFR BLD AUTO: 66 %
NRBC # BLD AUTO: 0 10E3/UL
NRBC BLD AUTO-RTO: 0 /100
PLATELET # BLD AUTO: 518 10E3/UL (ref 150–450)
POTASSIUM SERPL-SCNC: 5.9 MMOL/L (ref 3.4–5.3)
RBC # BLD AUTO: 2.98 10E6/UL (ref 4.4–5.9)
WBC # BLD AUTO: 5.5 10E3/UL (ref 4–11)

## 2022-10-27 PROCEDURE — 36415 COLL VENOUS BLD VENIPUNCTURE: CPT | Performed by: INTERNAL MEDICINE

## 2022-10-27 PROCEDURE — 84132 ASSAY OF SERUM POTASSIUM: CPT | Performed by: INTERNAL MEDICINE

## 2022-10-27 PROCEDURE — P9604 ONE-WAY ALLOW PRORATED TRIP: HCPCS | Performed by: INTERNAL MEDICINE

## 2022-10-27 PROCEDURE — 82565 ASSAY OF CREATININE: CPT | Performed by: INTERNAL MEDICINE

## 2022-10-27 PROCEDURE — 85025 COMPLETE CBC W/AUTO DIFF WBC: CPT | Performed by: INTERNAL MEDICINE

## 2022-10-27 PROCEDURE — 84520 ASSAY OF UREA NITROGEN: CPT | Performed by: INTERNAL MEDICINE

## 2022-10-27 PROCEDURE — 84460 ALANINE AMINO (ALT) (SGPT): CPT | Performed by: INTERNAL MEDICINE

## 2022-10-27 PROCEDURE — 82247 BILIRUBIN TOTAL: CPT | Performed by: INTERNAL MEDICINE

## 2022-10-27 PROCEDURE — 84075 ASSAY ALKALINE PHOSPHATASE: CPT | Performed by: INTERNAL MEDICINE

## 2022-10-27 PROCEDURE — 86140 C-REACTIVE PROTEIN: CPT | Performed by: INTERNAL MEDICINE

## 2022-10-28 DIAGNOSIS — R52 SEVERE PAIN: ICD-10-CM

## 2022-10-28 DIAGNOSIS — R53.83 TIREDNESS: ICD-10-CM

## 2022-10-28 DIAGNOSIS — Z98.1 S/P FUSION OF SACROILIAC JOINT: Primary | ICD-10-CM

## 2022-10-28 RX ORDER — METHYLPHENIDATE HYDROCHLORIDE 10 MG/1
10 TABLET ORAL 2 TIMES DAILY
Qty: 30 TABLET | Refills: 0 | Status: SHIPPED | OUTPATIENT
Start: 2022-10-28 | End: 2022-11-11

## 2022-10-28 RX ORDER — HYDROMORPHONE HYDROCHLORIDE 2 MG/1
2 TABLET ORAL EVERY 4 HOURS PRN
Qty: 30 TABLET | Refills: 0 | Status: SHIPPED | OUTPATIENT
Start: 2022-10-28 | End: 2022-10-29

## 2022-10-29 ENCOUNTER — TELEPHONE (OUTPATIENT)
Dept: GERIATRICS | Facility: CLINIC | Age: 82
End: 2022-10-29

## 2022-10-29 DIAGNOSIS — R52 PAIN: Primary | ICD-10-CM

## 2022-10-29 RX ORDER — HYDROCODONE BITARTRATE AND ACETAMINOPHEN 5; 325 MG/1; MG/1
2 TABLET ORAL EVERY 6 HOURS PRN
Qty: 30 TABLET | Refills: 0 | Status: SHIPPED | OUTPATIENT
Start: 2022-10-29 | End: 2022-11-01

## 2022-10-29 NOTE — TELEPHONE ENCOUNTER
[place] [facility]ERIATRIC SERVICES TELEPHONE ENCOUNTER    Mayur Patel is a 81 year old  (1940),Nurse called today to report: patient has vicodin in his room and states dilaudid does not help with the pain he would like to use the vicodin.      ASSESSMENT/PLAN  Pain  - HYDROcodone-acetaminophen (NORCO) 5-325 MG tablet; Take 2 tablets by mouth every 6 hours as needed for pain  - discontinue dilaudid  Be careful to limit tylenol to 4 gram per day.     Alejandra Olson, NP

## 2022-11-01 VITALS
WEIGHT: 126.8 LBS | RESPIRATION RATE: 18 BRPM | SYSTOLIC BLOOD PRESSURE: 88 MMHG | OXYGEN SATURATION: 98 % | TEMPERATURE: 98.6 F | DIASTOLIC BLOOD PRESSURE: 53 MMHG | BODY MASS INDEX: 19.28 KG/M2 | HEART RATE: 102 BPM

## 2022-11-02 ENCOUNTER — TRANSITIONAL CARE UNIT VISIT (OUTPATIENT)
Dept: GERIATRICS | Facility: CLINIC | Age: 82
End: 2022-11-02
Payer: MEDICARE

## 2022-11-02 DIAGNOSIS — E46 PROTEIN-CALORIE MALNUTRITION, UNSPECIFIED SEVERITY (H): ICD-10-CM

## 2022-11-02 DIAGNOSIS — M79.89 NECROTIZING SOFT TISSUE INFECTION: Primary | ICD-10-CM

## 2022-11-02 DIAGNOSIS — M47.816 LUMBAR SPONDYLOSIS: ICD-10-CM

## 2022-11-02 DIAGNOSIS — I10 BENIGN ESSENTIAL HYPERTENSION: ICD-10-CM

## 2022-11-02 PROCEDURE — 99310 SBSQ NF CARE HIGH MDM 45: CPT | Performed by: FAMILY MEDICINE

## 2022-11-02 RX ORDER — CEFDINIR 300 MG/1
300 CAPSULE ORAL 2 TIMES DAILY
Status: ON HOLD | COMMUNITY
End: 2023-08-01

## 2022-11-02 NOTE — PROGRESS NOTES
[facility] GERIATRIC SERVICES    Facility:   [facility] ([facility]) [[id number]]   Code Status: FULL CODE      CHIEF COMPLAINT/REASON FOR VISIT:  Chief Complaint   Patient presents with     RECHECK       HISTORY:      HPI: Mayur is a 81 year old male Who I had the opportunity and pleasure to revisit with once again today not only secondary to his hospitalization September 13 through October 12, 2022 secondary necrotizing soft tissue infection along with a status post fusion of the sacroiliac joint along with septic shock cellulitis left lower extremity who did an I&D of the right thigh and muscle and fascia and subcutaneous fat on September 14 as well as today's discussion of his wound VAC, IV antibiotics, laboratory studies, antibiotics, sleep and the rehabilitation component.  Regarding the rehabilitation he states that he is now able ambulate about 20 feet with therapy.  Does admit to deconditioning.  No pain issues.  Is on Tylenol and gabapentin.  He also does have Vicodin as needed usually takes about 1 dose per day.  His systolic blood pressures ranging  his weight 126 pounds in comparison to October 27 123 pounds.  His appetite is improving.  No longer has an NG tube.  All oral feedings plus a number of extra nutrient supplements.  He is also on Culturelle.  Recently did have a visit with infectious disease has another visit with the surgeon coming up on Monday the seventh for the wound VAC in his right medial thigh.  They did discontinue the IV antibiotics the PICC line and the laboratory studies he supposed to be on cefdinir 300 mg twice a day for 90 days which started on November 2 which will go until January 31, 2023.  Otherwise he is in good spirits.  He does look good.  Had a chance to go over his laboratory studies.  He does feel comfortable and positive with the rehabilitation component.  For sleep recently increase the trazodone to 100 mg which did improve from the 50 mg dose.    Past Medical  History:   Diagnosis Date     Back pain      BPH (benign prostatic hyperplasia)      Disease of thyroid gland      HTN (hypertension)      Hypertension      Sinusitis      Stroke (H)      Unspecified cerebral artery occlusion with cerebral infarction             Family History   Problem Relation Age of Onset     Substance Abuse Son      Hypertension Mother       Social History     Socioeconomic History     Marital status:    Tobacco Use     Smoking status: Former     Types: Cigarettes     Smokeless tobacco: Never   Substance and Sexual Activity     Alcohol use: Yes     Comment: Alcoholic Drinks/day: Social     Drug use: Never   Social History Narrative    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    PHYSICAL EXAM:   Pleasant gentleman in no acute distress.  Head is normocephalic.  Conjunctiva is pink and sclerae is clear.  Neck is supple without adenopathy.   Lung sounds are clear throughout.  Cardiovascular S1 is 2 regular rate and rhythm and no lower extremity edema.  Gastrointestinal soft and nontender with thin build.  Musculoskeletal -working with therapy.  Denies discomfort.  Thin build.  Psychiatric: Pleasant affect.  Wound VAC right medial inner thigh.       Current Outpatient Medications:      cefdinir (OMNICEF) 300 MG capsule, Take 300 mg by mouth 2 times daily 90 days, Disp: , Rfl:      acetaminophen (TYLENOL) 500 MG tablet, Take 1,000 mg by mouth 3 times daily, Disp: , Rfl:      calcium carbonate (TUMS) 500 MG chewable tablet, Take 2 chew tab by mouth 3 times daily as needed for heartburn, Disp: , Rfl:      ferrous  sulfate (FEROSUL) 325 (65 Fe) MG tablet, Take 325 mg by mouth every other day, Disp: , Rfl:      gabapentin (NEURONTIN) 100 MG capsule, Take 200 mg by mouth 3 times daily, Disp: , Rfl:      methylphenidate (RITALIN) 10 MG tablet, Take 1 tablet (10 mg) by mouth 2 times daily, Disp: 30 tablet, Rfl: 0     mirtazapine (REMERON) 7.5 MG tablet, Take 7.5 mg by mouth At Bedtime, Disp: , Rfl:      multivitamin, therapeutic (THERA-VIT) TABS tablet, Take 1 tablet by mouth daily, Disp: , Rfl:      nystatin (MYCOSTATIN) 712134 UNIT/GM external powder, Apply topically 2 times daily To groin, Disp: , Rfl:      polyethylene glycol (MIRALAX) 17 g packet, Take 1 packet by mouth daily, Disp: , Rfl:      Protein (PROSOURCE PO), Take 90 mLs by mouth daily Mix with 120 ml water, Disp: , Rfl:      senna-docusate (SENOKOT-S/PERICOLACE) 8.6-50 MG tablet, Take 1 tablet by mouth daily, Disp: , Rfl:      tamsulosin (FLOMAX) 0.4 MG capsule, Take 1 capsule (0.4 mg) by mouth 2 times daily, Disp: 180 capsule, Rfl: 0     traZODone (DESYREL) 50 MG tablet, Take 100 mg by mouth At Bedtime, Disp: , Rfl:      vitamin C (ASCORBIC ACID) 250 MG tablet, Take 250 mg by mouth daily, Disp: , Rfl:      vitamin D2 (ERGOCALCIFEROL) 72073 units (1250 mcg) capsule, Take 50,000 Units by mouth once a week On Fridays, Disp: , Rfl:      zinc Oxide (DESITIN) 40 % paste, Apply topically 4 times daily as needed for dry skin or irritation Apply as needed for perianal irritation after BM, Disp: , Rfl:     BP (!) 88/53   Pulse 102   Temp 98.6  F (37  C)   Resp 18   Wt 57.5 kg (126 lb 12.8 oz)   SpO2 98%   BMI 19.28 kg/m      LABS:   CRP Inflammation   Date Value Ref Range Status   10/27/2022 12.90 (H) <5.00 mg/L Final     CBC RESULTS: Recent Labs   Lab Test 10/27/22  0700   WBC 5.5   RBC 2.98*   HGB 8.5*   HCT 28.5*   MCV 96   MCH 28.5   MCHC 29.8*   RDW 16.8*   *     Last Comprehensive Metabolic Panel:  Sodium   Date Value Ref Range Status   08/23/2022 137 136  - 145 mmol/L Final     Potassium   Date Value Ref Range Status   10/27/2022 5.9 (H) 3.4 - 5.3 mmol/L Final   03/09/2022 4.5 3.5 - 5.0 mmol/L Final   02/28/2014 4.8 3.4 - 5.3 mmol/L Final     Chloride   Date Value Ref Range Status   08/23/2022 101 98 - 107 mmol/L Final   09/15/2021 101 98 - 107 mmol/L Final     Carbon Dioxide (CO2)   Date Value Ref Range Status   08/23/2022 26 22 - 29 mmol/L Final   09/15/2021 21 (L) 22 - 31 mmol/L Final     Anion Gap   Date Value Ref Range Status   08/23/2022 10 7 - 15 mmol/L Final   09/15/2021 14 5 - 18 mmol/L Final     Glucose   Date Value Ref Range Status   08/23/2022 83 70 - 99 mg/dL Final   09/15/2021 99 70 - 125 mg/dL Final     Urea Nitrogen   Date Value Ref Range Status   10/27/2022 27.0 (H) 8.0 - 23.0 mg/dL Final   09/15/2021 19 8 - 28 mg/dL Final     Creatinine   Date Value Ref Range Status   10/27/2022 0.71 0.67 - 1.17 mg/dL Final   02/28/2014 1.20 0.66 - 1.25 mg/dL Final     GFR Estimate   Date Value Ref Range Status   10/27/2022 >90 >60 mL/min/1.73m2 Final     Comment:     Effective December 21, 2021 eGFRcr in adults is calculated using the 2021 CKD-EPI creatinine equation which includes age and gender (Cordell et al., NEJM, DOI: 10.1056/[id number])   03/08/2021 45 (L) >60 mL/min/1.73m2 Final   02/28/2014 60 (L) >60 mL/min/1.7m2 Final     Calcium   Date Value Ref Range Status   08/23/2022 9.1 8.8 - 10.2 mg/dL Final     Last Comprehensive Metabolic Panel:  Sodium   Date Value Ref Range Status   08/23/2022 137 136 - 145 mmol/L Final     Potassium   Date Value Ref Range Status   10/27/2022 5.9 (H) 3.4 - 5.3 mmol/L Final   03/09/2022 4.5 3.5 - 5.0 mmol/L Final   02/28/2014 4.8 3.4 - 5.3 mmol/L Final     Chloride   Date Value Ref Range Status   08/23/2022 101 98 - 107 mmol/L Final   09/15/2021 101 98 - 107 mmol/L Final     Carbon Dioxide (CO2)   Date Value Ref Range Status   08/23/2022 26 22 - 29 mmol/L Final   09/15/2021 21 (L) 22 - 31 mmol/L Final     Anion Gap   Date  Value Ref Range Status   08/23/2022 10 7 - 15 mmol/L Final   09/15/2021 14 5 - 18 mmol/L Final     Glucose   Date Value Ref Range Status   08/23/2022 83 70 - 99 mg/dL Final   09/15/2021 99 70 - 125 mg/dL Final     Urea Nitrogen   Date Value Ref Range Status   10/27/2022 27.0 (H) 8.0 - 23.0 mg/dL Final   09/15/2021 19 8 - 28 mg/dL Final     Creatinine   Date Value Ref Range Status   10/27/2022 0.71 0.67 - 1.17 mg/dL Final   02/28/2014 1.20 0.66 - 1.25 mg/dL Final     GFR Estimate   Date Value Ref Range Status   10/27/2022 >90 >60 mL/min/1.73m2 Final     Comment:     Effective December 21, 2021 eGFRcr in adults is calculated using the 2021 CKD-EPI creatinine equation which includes age and gender (Cordell et al., NE, DOI: 10.1056/[id number])   03/08/2021 45 (L) >60 mL/min/1.73m2 Final   02/28/2014 60 (L) >60 mL/min/1.7m2 Final     Calcium   Date Value Ref Range Status   08/23/2022 9.1 8.8 - 10.2 mg/dL Final     Bilirubin Total   Date Value Ref Range Status   10/27/2022 0.2 <=1.2 mg/dL Final     Alkaline Phosphatase   Date Value Ref Range Status   10/27/2022 128 40 - 129 U/L Final     ALT   Date Value Ref Range Status   10/27/2022 152 (H) 10 - 50 U/L Final     AST   Date Value Ref Range Status   10/23/2022 62 (H) 10 - 50 U/L Final                   ASSESSMENT:    Encounter Diagnoses   Name Primary?     Necrotizing soft tissue infection Yes     Lumbar spondylosis      Benign essential hypertension      Protein-calorie malnutrition, unspecified severity (H)        PLAN:    He does have a visit with the surgeon coming up on Monday the seventh.  Does have a wound VAC.  Infectious disease did discontinue the IV antibiotics the PICC line and the laboratory studies and put him on cefdinir 300 mg twice a day for 90 days and will finish up on January 31.  Otherwise his appetite is improving.  Getting a number of extra nutrient supplements.  Sleeping well at night.  No pain issues at this time.  He did not have any other  questions.        Electronically signed by: Jasmeet Porras NP

## 2022-11-02 NOTE — LETTER
11/2/2022        RE: Mayur Patel  [id number]  [facility] [address]        [facility] GERIATRIC SERVICES    Facility:   [facility] ([facility]) [[id number]]   Code Status: FULL CODE      CHIEF COMPLAINT/REASON FOR VISIT:  Chief Complaint   Patient presents with     RECHECK       HISTORY:      HPI: Mayur is a 81 year old male Who I had the opportunity and pleasure to revisit with once again today not only secondary to his hospitalization September 13 through October 12, 2022 secondary necrotizing soft tissue infection along with a status post fusion of the sacroiliac joint along with septic shock cellulitis left lower extremity who did an I&D of the right thigh and muscle and fascia and subcutaneous fat on September 14 as well as today's discussion of his wound VAC, IV antibiotics, laboratory studies, antibiotics, sleep and the rehabilitation component.  Regarding the rehabilitation he states that he is now able ambulate about 20 feet with therapy.  Does admit to deconditioning.  No pain issues.  Is on Tylenol and gabapentin.  He also does have Vicodin as needed usually takes about 1 dose per day.  His systolic blood pressures ranging  his weight 126 pounds in comparison to October 27 123 pounds.  His appetite is improving.  No longer has an NG tube.  All oral feedings plus a number of extra nutrient supplements.  He is also on Culturelle.  Recently did have a visit with infectious disease has another visit with the surgeon coming up on Monday the seventh for the wound VAC in his right medial thigh.  They did discontinue the IV antibiotics the PICC line and the laboratory studies he supposed to be on cefdinir 300 mg twice a day for 90 days which started on November 2 which will go until January 31, 2023.  Otherwise he is in good spirits.  He does look good.  Had a chance to go over his laboratory studies.  He does feel comfortable and positive with the rehabilitation component.  For  sleep recently increase the trazodone to 100 mg which did improve from the 50 mg dose.    Past Medical History:   Diagnosis Date     Back pain      BPH (benign prostatic hyperplasia)      Disease of thyroid gland      HTN (hypertension)      Hypertension      Sinusitis      Stroke (H)      Unspecified cerebral artery occlusion with cerebral infarction             Family History   Problem Relation Age of Onset     Substance Abuse Son      Hypertension Mother       Social History     Socioeconomic History     Marital status:    Tobacco Use     Smoking status: Former     Types: Cigarettes     Smokeless tobacco: Never   Substance and Sexual Activity     Alcohol use: Yes     Comment: Alcoholic Drinks/day: Social     Drug use: Never   Social History Narrative    Oral maxillofacial surgeon in [place]       No new changes to the review of systems or physical exam since our last visit on October 26  REVIEW OF SYSTEM:  He currently denies any new symptoms of fever cough or cold sore throat postnasal drip allergies shortness of breath dyspnea wheezing coughing chest pain dizziness vertigo nausea vomiting diarrhea dysuria frequency urgency headaches or unusual myalgias or arthralgias.    PHYSICAL EXAM:   Pleasant gentleman in no acute distress.  Head is normocephalic.  Conjunctiva is pink and sclerae is clear.  Neck is supple without adenopathy.   Lung sounds are clear throughout.  Cardiovascular S1 is 2 regular rate and rhythm and no lower extremity edema.  Gastrointestinal soft and nontender with thin build.  Musculoskeletal -working with therapy.  Denies discomfort.  Thin build.  Psychiatric: Pleasant affect.  Wound VAC right medial inner thigh.       Current Outpatient Medications:      cefdinir (OMNICEF) 300 MG capsule, Take 300 mg by mouth 2 times daily 90 days, Disp: , Rfl:      acetaminophen (TYLENOL) 500 MG tablet, Take 1,000 mg by mouth 3 times daily, Disp: , Rfl:      calcium carbonate (TUMS) 500 MG chewable  tablet, Take 2 chew tab by mouth 3 times daily as needed for heartburn, Disp: , Rfl:      ferrous sulfate (FEROSUL) 325 (65 Fe) MG tablet, Take 325 mg by mouth every other day, Disp: , Rfl:      gabapentin (NEURONTIN) 100 MG capsule, Take 200 mg by mouth 3 times daily, Disp: , Rfl:      methylphenidate (RITALIN) 10 MG tablet, Take 1 tablet (10 mg) by mouth 2 times daily, Disp: 30 tablet, Rfl: 0     mirtazapine (REMERON) 7.5 MG tablet, Take 7.5 mg by mouth At Bedtime, Disp: , Rfl:      multivitamin, therapeutic (THERA-VIT) TABS tablet, Take 1 tablet by mouth daily, Disp: , Rfl:      nystatin (MYCOSTATIN) 372688 UNIT/GM external powder, Apply topically 2 times daily To groin, Disp: , Rfl:      polyethylene glycol (MIRALAX) 17 g packet, Take 1 packet by mouth daily, Disp: , Rfl:      Protein (PROSOURCE PO), Take 90 mLs by mouth daily Mix with 120 ml water, Disp: , Rfl:      senna-docusate (SENOKOT-S/PERICOLACE) 8.6-50 MG tablet, Take 1 tablet by mouth daily, Disp: , Rfl:      tamsulosin (FLOMAX) 0.4 MG capsule, Take 1 capsule (0.4 mg) by mouth 2 times daily, Disp: 180 capsule, Rfl: 0     traZODone (DESYREL) 50 MG tablet, Take 100 mg by mouth At Bedtime, Disp: , Rfl:      vitamin C (ASCORBIC ACID) 250 MG tablet, Take 250 mg by mouth daily, Disp: , Rfl:      vitamin D2 (ERGOCALCIFEROL) 41386 units (1250 mcg) capsule, Take 50,000 Units by mouth once a week On Fridays, Disp: , Rfl:      zinc Oxide (DESITIN) 40 % paste, Apply topically 4 times daily as needed for dry skin or irritation Apply as needed for perianal irritation after BM, Disp: , Rfl:     BP (!) 88/53   Pulse 102   Temp 98.6  F (37  C)   Resp 18   Wt 57.5 kg (126 lb 12.8 oz)   SpO2 98%   BMI 19.28 kg/m      LABS:   CRP Inflammation   Date Value Ref Range Status   10/27/2022 12.90 (H) <5.00 mg/L Final     CBC RESULTS: Recent Labs   Lab Test 10/27/22  0700   WBC 5.5   RBC 2.98*   HGB 8.5*   HCT 28.5*   MCV 96   MCH 28.5   MCHC 29.8*   RDW 16.8*   *      Last Comprehensive Metabolic Panel:  Sodium   Date Value Ref Range Status   08/23/2022 137 136 - 145 mmol/L Final     Potassium   Date Value Ref Range Status   10/27/2022 5.9 (H) 3.4 - 5.3 mmol/L Final   03/09/2022 4.5 3.5 - 5.0 mmol/L Final   02/28/2014 4.8 3.4 - 5.3 mmol/L Final     Chloride   Date Value Ref Range Status   08/23/2022 101 98 - 107 mmol/L Final   09/15/2021 101 98 - 107 mmol/L Final     Carbon Dioxide (CO2)   Date Value Ref Range Status   08/23/2022 26 22 - 29 mmol/L Final   09/15/2021 21 (L) 22 - 31 mmol/L Final     Anion Gap   Date Value Ref Range Status   08/23/2022 10 7 - 15 mmol/L Final   09/15/2021 14 5 - 18 mmol/L Final     Glucose   Date Value Ref Range Status   08/23/2022 83 70 - 99 mg/dL Final   09/15/2021 99 70 - 125 mg/dL Final     Urea Nitrogen   Date Value Ref Range Status   10/27/2022 27.0 (H) 8.0 - 23.0 mg/dL Final   09/15/2021 19 8 - 28 mg/dL Final     Creatinine   Date Value Ref Range Status   10/27/2022 0.71 0.67 - 1.17 mg/dL Final   02/28/2014 1.20 0.66 - 1.25 mg/dL Final     GFR Estimate   Date Value Ref Range Status   10/27/2022 >90 >60 mL/min/1.73m2 Final     Comment:     Effective December 21, 2021 eGFRcr in adults is calculated using the 2021 CKD-EPI creatinine equation which includes age and gender (Cordell et al., NEJM, DOI: 10.1056/OERCax0303717)   03/08/2021 45 (L) >60 mL/min/1.73m2 Final   02/28/2014 60 (L) >60 mL/min/1.7m2 Final     Calcium   Date Value Ref Range Status   08/23/2022 9.1 8.8 - 10.2 mg/dL Final     Last Comprehensive Metabolic Panel:  Sodium   Date Value Ref Range Status   08/23/2022 137 136 - 145 mmol/L Final     Potassium   Date Value Ref Range Status   10/27/2022 5.9 (H) 3.4 - 5.3 mmol/L Final   03/09/2022 4.5 3.5 - 5.0 mmol/L Final   02/28/2014 4.8 3.4 - 5.3 mmol/L Final     Chloride   Date Value Ref Range Status   08/23/2022 101 98 - 107 mmol/L Final   09/15/2021 101 98 - 107 mmol/L Final     Carbon Dioxide (CO2)   Date Value Ref Range Status    08/23/2022 26 22 - 29 mmol/L Final   09/15/2021 21 (L) 22 - 31 mmol/L Final     Anion Gap   Date Value Ref Range Status   08/23/2022 10 7 - 15 mmol/L Final   09/15/2021 14 5 - 18 mmol/L Final     Glucose   Date Value Ref Range Status   08/23/2022 83 70 - 99 mg/dL Final   09/15/2021 99 70 - 125 mg/dL Final     Urea Nitrogen   Date Value Ref Range Status   10/27/2022 27.0 (H) 8.0 - 23.0 mg/dL Final   09/15/2021 19 8 - 28 mg/dL Final     Creatinine   Date Value Ref Range Status   10/27/2022 0.71 0.67 - 1.17 mg/dL Final   02/28/2014 1.20 0.66 - 1.25 mg/dL Final     GFR Estimate   Date Value Ref Range Status   10/27/2022 >90 >60 mL/min/1.73m2 Final     Comment:     Effective December 21, 2021 eGFRcr in adults is calculated using the 2021 CKD-EPI creatinine equation which includes age and gender (Cordell et al., NEJ, DOI: 10.1056/HRVEzu9289626)   03/08/2021 45 (L) >60 mL/min/1.73m2 Final   02/28/2014 60 (L) >60 mL/min/1.7m2 Final     Calcium   Date Value Ref Range Status   08/23/2022 9.1 8.8 - 10.2 mg/dL Final     Bilirubin Total   Date Value Ref Range Status   10/27/2022 0.2 <=1.2 mg/dL Final     Alkaline Phosphatase   Date Value Ref Range Status   10/27/2022 128 40 - 129 U/L Final     ALT   Date Value Ref Range Status   10/27/2022 152 (H) 10 - 50 U/L Final     AST   Date Value Ref Range Status   10/23/2022 62 (H) 10 - 50 U/L Final                   ASSESSMENT:    Encounter Diagnoses   Name Primary?     Necrotizing soft tissue infection Yes     Lumbar spondylosis      Benign essential hypertension      Protein-calorie malnutrition, unspecified severity (H)        PLAN:    He does have a visit with the surgeon coming up on Monday the seventh.  Does have a wound VAC.  Infectious disease did discontinue the IV antibiotics the PICC line and the laboratory studies and put him on cefdinir 300 mg twice a day for 90 days and will finish up on January 31.  Otherwise his appetite is improving.  Getting a number of extra nutrient  supplements.  Sleeping well at night.  No pain issues at this time.  He did not have any other questions.        Electronically signed by: Jasmeet Porras NP          Sincerely,        Jasmeet Porras NP

## 2022-11-08 VITALS
BODY MASS INDEX: 19.19 KG/M2 | WEIGHT: 126.2 LBS | TEMPERATURE: 98.2 F | OXYGEN SATURATION: 99 % | HEART RATE: 89 BPM | RESPIRATION RATE: 18 BRPM | SYSTOLIC BLOOD PRESSURE: 116 MMHG | DIASTOLIC BLOOD PRESSURE: 69 MMHG

## 2022-11-09 ENCOUNTER — TRANSITIONAL CARE UNIT VISIT (OUTPATIENT)
Dept: GERIATRICS | Facility: CLINIC | Age: 82
End: 2022-11-09
Payer: MEDICARE

## 2022-11-09 DIAGNOSIS — R53.81 PHYSICAL DEBILITY: ICD-10-CM

## 2022-11-09 DIAGNOSIS — G47.9 SLEEP DISORDER: ICD-10-CM

## 2022-11-09 DIAGNOSIS — I10 BENIGN ESSENTIAL HYPERTENSION: ICD-10-CM

## 2022-11-09 DIAGNOSIS — E46 PROTEIN-CALORIE MALNUTRITION, UNSPECIFIED SEVERITY (H): Primary | ICD-10-CM

## 2022-11-09 PROCEDURE — 99310 SBSQ NF CARE HIGH MDM 45: CPT | Performed by: FAMILY MEDICINE

## 2022-11-09 NOTE — PROGRESS NOTES
[facility] GERIATRIC SERVICES    Facility:   [facility] ([facility]) [[id number]]   Code Status: FULL CODE      CHIEF COMPLAINT/REASON FOR VISIT:  Chief Complaint   Patient presents with     RECHECK       HISTORY:      HPI: Mayur is a 81 year old male Who does remain in the transitional care unit room 101 and who I the opportunity to revisit with once again today not only secondary to his hospitalization September 13 through October 12, 2022 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  Pain management, blood pressures and nutrition.  Regarding the rehabilitation process he states that he is able to ambulate a number of times back-and-forth up and down the hallway with therapy has improved.  He is actually thinking at this point he is strong enough to be able to make it to home.  His systolic blood pressures ranging  he has been afebrile and also on room air.  His weight 126 pounds in comparison to November 2 127 pounds.  He has met with dietary and is on a number of nutrient supplements.  For the infection he is on Omnicef 300 mg twice daily for 90 days.  He is on ferrous sulfate every other day also for depression on mirtazapine 7.5 mg.  For his pain is on Neurontin scheduled Tylenol scheduled and does have Norco as needed usually about 1 dose per day and last week the Robaxin was discontinued he did not miss that.  Otherwise he is feeling pretty good overall we talked about his sleep he like to continue with the trazodone 100 mg.    Past Medical History:   Diagnosis Date     Back pain      BPH (benign prostatic hyperplasia)      Disease of thyroid gland      HTN (hypertension)       Hypertension      Sinusitis      Stroke (H)      Unspecified cerebral artery occlusion with cerebral infarction             Family History   Problem Relation Age of Onset     Substance Abuse Son      Hypertension Mother       Social History     Socioeconomic History     Marital status:    Tobacco Use     Smoking status: Former     Types: Cigarettes     Smokeless tobacco: Never   Substance and Sexual Activity     Alcohol use: Yes     Comment: Alcoholic Drinks/day: Social     Drug use: Never   Social History Narrative    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    PHYSICAL EXAM:   Pleasant gentleman in no acute distress.  Head is normocephalic. Neck is supple without adenopathy.   Lung sounds are clear throughout.  Cardiovascular S1 is 2 regular rate and rhythm and no lower extremity edema.  Gastrointestinal soft and nontender with thin build.  Musculoskeletal -working with therapy.  Denies discomfort.  Thin build.  Psychiatric: Pleasant affect.       Current Outpatient Medications:      acetaminophen (TYLENOL) 500 MG tablet, Take 1,000 mg by mouth 3 times daily, Disp: , Rfl:      calcium carbonate (TUMS) 500 MG chewable tablet, Take 2 chew tab by mouth 3 times daily as needed for heartburn, Disp: , Rfl:      cefdinir (OMNICEF) 300 MG capsule, Take 300 mg by mouth 2 times daily 90 days, Disp: , Rfl:      ferrous sulfate (FEROSUL) 325 (65 Fe) MG tablet, Take 325 mg by mouth every other day, Disp: , Rfl:      gabapentin (NEURONTIN) 100 MG capsule, Take 200 mg by mouth 3 times daily, Disp: , Rfl:      methylphenidate  (RITALIN) 10 MG tablet, Take 1 tablet (10 mg) by mouth 2 times daily, Disp: 30 tablet, Rfl: 0     mirtazapine (REMERON) 7.5 MG tablet, Take 7.5 mg by mouth At Bedtime, Disp: , Rfl:      multivitamin, therapeutic (THERA-VIT) TABS tablet, Take 1 tablet by mouth daily, Disp: , Rfl:      nystatin (MYCOSTATIN) 265984 UNIT/GM external powder, Apply topically 2 times daily To groin, Disp: , Rfl:      polyethylene glycol (MIRALAX) 17 g packet, Take 1 packet by mouth daily, Disp: , Rfl:      Protein (PROSOURCE PO), Take 90 mLs by mouth daily Mix with 120 ml water, Disp: , Rfl:      senna-docusate (SENOKOT-S/PERICOLACE) 8.6-50 MG tablet, Take 1 tablet by mouth daily, Disp: , Rfl:      tamsulosin (FLOMAX) 0.4 MG capsule, Take 1 capsule (0.4 mg) by mouth 2 times daily, Disp: 180 capsule, Rfl: 0     traZODone (DESYREL) 50 MG tablet, Take 100 mg by mouth At Bedtime, Disp: , Rfl:      vitamin C (ASCORBIC ACID) 250 MG tablet, Take 250 mg by mouth daily, Disp: , Rfl:      vitamin D2 (ERGOCALCIFEROL) 43444 units (1250 mcg) capsule, Take 50,000 Units by mouth once a week On Fridays, Disp: , Rfl:      zinc Oxide (DESITIN) 40 % paste, Apply topically 4 times daily as needed for dry skin or irritation Apply as needed for perianal irritation after BM, Disp: , Rfl:     /69   Pulse 89   Temp 98.2  F (36.8  C)   Resp 18   Wt 57.2 kg (126 lb 3.2 oz)   SpO2 99%   BMI 19.19 kg/m      LABS:   Last Comprehensive Metabolic Panel:  Sodium   Date Value Ref Range Status   08/23/2022 137 136 - 145 mmol/L Final     Potassium   Date Value Ref Range Status   10/27/2022 5.9 (H) 3.4 - 5.3 mmol/L Final   03/09/2022 4.5 3.5 - 5.0 mmol/L Final   02/28/2014 4.8 3.4 - 5.3 mmol/L Final     Chloride   Date Value Ref Range Status   08/23/2022 101 98 - 107 mmol/L Final   09/15/2021 101 98 - 107 mmol/L Final     Carbon Dioxide (CO2)   Date Value Ref Range Status   08/23/2022 26 22 - 29 mmol/L Final   09/15/2021 21 (L) 22 - 31 mmol/L Final     Anion Gap    Date Value Ref Range Status   08/23/2022 10 7 - 15 mmol/L Final   09/15/2021 14 5 - 18 mmol/L Final     Glucose   Date Value Ref Range Status   08/23/2022 83 70 - 99 mg/dL Final   09/15/2021 99 70 - 125 mg/dL Final     Urea Nitrogen   Date Value Ref Range Status   10/27/2022 27.0 (H) 8.0 - 23.0 mg/dL Final   09/15/2021 19 8 - 28 mg/dL Final     Creatinine   Date Value Ref Range Status   10/27/2022 0.71 0.67 - 1.17 mg/dL Final   02/28/2014 1.20 0.66 - 1.25 mg/dL Final     GFR Estimate   Date Value Ref Range Status   10/27/2022 >90 >60 mL/min/1.73m2 Final     Comment:     Effective December 21, 2021 eGFRcr in adults is calculated using the 2021 CKD-EPI creatinine equation which includes age and gender (Cordell et al., NE, DOI: 10.1056/EUIRcl7873369)   03/08/2021 45 (L) >60 mL/min/1.73m2 Final   02/28/2014 60 (L) >60 mL/min/1.7m2 Final     Calcium   Date Value Ref Range Status   08/23/2022 9.1 8.8 - 10.2 mg/dL Final     CBC RESULTS: Recent Labs   Lab Test 10/27/22  0700   WBC 5.5   RBC 2.98*   HGB 8.5*   HCT 28.5*   MCV 96   MCH 28.5   MCHC 29.8*   RDW 16.8*   *         ASSESSMENT:    Encounter Diagnoses   Name Primary?     Protein-calorie malnutrition, unspecified severity (H) Yes     Benign essential hypertension      Sleep disorder      Physical debility        PLAN:    He is working with the dietary group regarding his protein his weights are pretty stable.  Blood pressures look good.  Moods are stable.  Pain managed with Neurontin Tylenol and Norco as needed usually about 1 dose per day.  Continue with Omnicef for 90 days.  He agrees with the current plan of care.  The wound VAC has now been discontinued and now is a Mepilex to the wound.  He did not have any other questions.        Electronically signed by: Jasmeet Porras NP

## 2022-11-09 NOTE — LETTER
11/9/2022        RE: Mayur Patel  [address]  [facility] [address]        M [facility] GERIATRIC SERVICES    Facility:   [facility] ([facility]) [[id number]]   Code Status: FULL CODE      CHIEF COMPLAINT/REASON FOR VISIT:  Chief Complaint   Patient presents with     RECHECK       HISTORY:      HPI: Mayur is a 81 year old male Who does remain in the transitional care unit room 101 and who I the opportunity to revisit with once again today not only secondary to his hospitalization September 13 through October 12, 2022 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  Pain management, blood pressures and nutrition.  Regarding the rehabilitation process he states that he is able to ambulate a number of times back-and-forth up and down the hallway with therapy has improved.  He is actually thinking at this point he is strong enough to be able to make it to home.  His systolic blood pressures ranging  he has been afebrile and also on room air.  His weight 126 pounds in comparison to November 2 127 pounds.  He has met with dietary and is on a number of nutrient supplements.  For the infection he is on Omnicef 300 mg twice daily for 90 days.  He is on ferrous sulfate every other day also for depression on mirtazapine 7.5 mg.  For his pain is on Neurontin scheduled Tylenol scheduled and does have Norco as needed usually about 1 dose per day and last week the Robaxin was discontinued he did not miss that.  Otherwise he is feeling pretty good overall we talked about his sleep he like to continue with the trazodone 100 mg.    Past Medical History:   Diagnosis Date     Back  pain      BPH (benign prostatic hyperplasia)      Disease of thyroid gland      HTN (hypertension)      Hypertension      Sinusitis      Stroke (H)      Unspecified cerebral artery occlusion with cerebral infarction             Family History   Problem Relation Age of Onset     Substance Abuse Son      Hypertension Mother       Social History     Socioeconomic History     Marital status:    Tobacco Use     Smoking status: Former     Types: Cigarettes     Smokeless tobacco: Never   Substance and Sexual Activity     Alcohol use: Yes     Comment: Alcoholic Drinks/day: Social     Drug use: Never   Social History Narrative    Oral maxillofacial surgeon in [place]       No other new changes to the review of systems or physical exam since our last visit on November 2  REVIEW OF SYSTEM:  He currently denies any new symptoms of fever cough or cold sore throat postnasal drip allergies shortness of breath dyspnea wheezing coughing chest pain dizziness vertigo nausea vomiting diarrhea dysuria frequency urgency headaches or unusual myalgias or arthralgias.    PHYSICAL EXAM:   Pleasant gentleman in no acute distress.  Head is normocephalic. Neck is supple without adenopathy.   Lung sounds are clear throughout.  Cardiovascular S1 is 2 regular rate and rhythm and no lower extremity edema.  Gastrointestinal soft and nontender with thin build.  Musculoskeletal -working with therapy.  Denies discomfort.  Thin build.  Psychiatric: Pleasant affect.       Current Outpatient Medications:      acetaminophen (TYLENOL) 500 MG tablet, Take 1,000 mg by mouth 3 times daily, Disp: , Rfl:      calcium carbonate (TUMS) 500 MG chewable tablet, Take 2 chew tab by mouth 3 times daily as needed for heartburn, Disp: , Rfl:      cefdinir (OMNICEF) 300 MG capsule, Take 300 mg by mouth 2 times daily 90 days, Disp: , Rfl:      ferrous sulfate (FEROSUL) 325 (65 Fe) MG tablet, Take 325 mg by mouth every other day, Disp: , Rfl:      gabapentin  (NEURONTIN) 100 MG capsule, Take 200 mg by mouth 3 times daily, Disp: , Rfl:      methylphenidate (RITALIN) 10 MG tablet, Take 1 tablet (10 mg) by mouth 2 times daily, Disp: 30 tablet, Rfl: 0     mirtazapine (REMERON) 7.5 MG tablet, Take 7.5 mg by mouth At Bedtime, Disp: , Rfl:      multivitamin, therapeutic (THERA-VIT) TABS tablet, Take 1 tablet by mouth daily, Disp: , Rfl:      nystatin (MYCOSTATIN) 613688 UNIT/GM external powder, Apply topically 2 times daily To groin, Disp: , Rfl:      polyethylene glycol (MIRALAX) 17 g packet, Take 1 packet by mouth daily, Disp: , Rfl:      Protein (PROSOURCE PO), Take 90 mLs by mouth daily Mix with 120 ml water, Disp: , Rfl:      senna-docusate (SENOKOT-S/PERICOLACE) 8.6-50 MG tablet, Take 1 tablet by mouth daily, Disp: , Rfl:      tamsulosin (FLOMAX) 0.4 MG capsule, Take 1 capsule (0.4 mg) by mouth 2 times daily, Disp: 180 capsule, Rfl: 0     traZODone (DESYREL) 50 MG tablet, Take 100 mg by mouth At Bedtime, Disp: , Rfl:      vitamin C (ASCORBIC ACID) 250 MG tablet, Take 250 mg by mouth daily, Disp: , Rfl:      vitamin D2 (ERGOCALCIFEROL) 69087 units (1250 mcg) capsule, Take 50,000 Units by mouth once a week On Fridays, Disp: , Rfl:      zinc Oxide (DESITIN) 40 % paste, Apply topically 4 times daily as needed for dry skin or irritation Apply as needed for perianal irritation after BM, Disp: , Rfl:     /69   Pulse 89   Temp 98.2  F (36.8  C)   Resp 18   Wt 57.2 kg (126 lb 3.2 oz)   SpO2 99%   BMI 19.19 kg/m      LABS:   Last Comprehensive Metabolic Panel:  Sodium   Date Value Ref Range Status   08/23/2022 137 136 - 145 mmol/L Final     Potassium   Date Value Ref Range Status   10/27/2022 5.9 (H) 3.4 - 5.3 mmol/L Final   03/09/2022 4.5 3.5 - 5.0 mmol/L Final   02/28/2014 4.8 3.4 - 5.3 mmol/L Final     Chloride   Date Value Ref Range Status   08/23/2022 101 98 - 107 mmol/L Final   09/15/2021 101 98 - 107 mmol/L Final     Carbon Dioxide (CO2)   Date Value Ref Range  Status   08/23/2022 26 22 - 29 mmol/L Final   09/15/2021 21 (L) 22 - 31 mmol/L Final     Anion Gap   Date Value Ref Range Status   08/23/2022 10 7 - 15 mmol/L Final   09/15/2021 14 5 - 18 mmol/L Final     Glucose   Date Value Ref Range Status   08/23/2022 83 70 - 99 mg/dL Final   09/15/2021 99 70 - 125 mg/dL Final     Urea Nitrogen   Date Value Ref Range Status   10/27/2022 27.0 (H) 8.0 - 23.0 mg/dL Final   09/15/2021 19 8 - 28 mg/dL Final     Creatinine   Date Value Ref Range Status   10/27/2022 0.71 0.67 - 1.17 mg/dL Final   02/28/2014 1.20 0.66 - 1.25 mg/dL Final     GFR Estimate   Date Value Ref Range Status   10/27/2022 >90 >60 mL/min/1.73m2 Final     Comment:     Effective December 21, 2021 eGFRcr in adults is calculated using the 2021 CKD-EPI creatinine equation which includes age and gender (Cordell et al., NEJ, DOI: 10.1056/YPAFvj8547268)   03/08/2021 45 (L) >60 mL/min/1.73m2 Final   02/28/2014 60 (L) >60 mL/min/1.7m2 Final     Calcium   Date Value Ref Range Status   08/23/2022 9.1 8.8 - 10.2 mg/dL Final     CBC RESULTS: Recent Labs   Lab Test 10/27/22  0700   WBC 5.5   RBC 2.98*   HGB 8.5*   HCT 28.5*   MCV 96   MCH 28.5   MCHC 29.8*   RDW 16.8*   *         ASSESSMENT:    Encounter Diagnoses   Name Primary?     Protein-calorie malnutrition, unspecified severity (H) Yes     Benign essential hypertension      Sleep disorder      Physical debility        PLAN:    He is working with the dietary group regarding his protein his weights are pretty stable.  Blood pressures look good.  Moods are stable.  Pain managed with Neurontin Tylenol and Norco as needed usually about 1 dose per day.  Continue with Omnicef for 90 days.  He agrees with the current plan of care.  The wound VAC has now been discontinued and now is a Mepilex to the wound.  He did not have any other questions.        Electronically signed by: Jasmeet Porras NP          Sincerely,        Jasmeet Porras NP

## 2022-11-11 ENCOUNTER — PATIENT OUTREACH (OUTPATIENT)
Dept: CARE COORDINATION | Facility: CLINIC | Age: 82
End: 2022-11-11

## 2022-11-11 DIAGNOSIS — M79.89 NECROTIZING SOFT TISSUE INFECTION: Primary | ICD-10-CM

## 2022-11-11 DIAGNOSIS — R53.83 TIREDNESS: ICD-10-CM

## 2022-11-11 RX ORDER — HYDROCODONE BITARTRATE AND ACETAMINOPHEN 5; 325 MG/1; MG/1
2 TABLET ORAL EVERY 6 HOURS PRN
Qty: 60 TABLET | Refills: 0 | Status: SHIPPED | OUTPATIENT
Start: 2022-11-11 | End: 2022-11-19

## 2022-11-11 RX ORDER — METHYLPHENIDATE HYDROCHLORIDE 10 MG/1
10 TABLET ORAL 2 TIMES DAILY
Qty: 90 TABLET | Refills: 0 | Status: ON HOLD | OUTPATIENT
Start: 2022-11-11 | End: 2023-08-01

## 2022-11-11 NOTE — PROGRESS NOTES
Clinic Care Coordination Contact  Care Team Conversations  Contact from TCU SW- patient is planning on discharge to home on 11-19.    Plan- contact patient after discharge.  Social Will Fuller  [facility]  [phone]         Contact   Chart Review     Situation: Patient chart reviewed by .    Background: following while in TCU.    Assessment: Patient continues to receive care in TCU.    Plan/Recommendations: Will do chart review in two weeks. Will contact patient if notified of discharge.     Social Will Fuller  [facility]  [phone]

## 2022-11-15 VITALS
TEMPERATURE: 98.9 F | RESPIRATION RATE: 20 BRPM | HEART RATE: 86 BPM | BODY MASS INDEX: 19.31 KG/M2 | OXYGEN SATURATION: 98 % | WEIGHT: 127 LBS

## 2022-11-16 ENCOUNTER — TRANSITIONAL CARE UNIT VISIT (OUTPATIENT)
Dept: GERIATRICS | Facility: CLINIC | Age: 82
End: 2022-11-16
Payer: MEDICARE

## 2022-11-16 DIAGNOSIS — I10 BENIGN ESSENTIAL HYPERTENSION: ICD-10-CM

## 2022-11-16 DIAGNOSIS — M43.27 FUSION OF LUMBOSACRAL SPINE: Primary | ICD-10-CM

## 2022-11-16 DIAGNOSIS — M79.89 NECROTIZING SOFT TISSUE INFECTION: ICD-10-CM

## 2022-11-16 DIAGNOSIS — E03.9 HYPOTHYROIDISM, UNSPECIFIED TYPE: ICD-10-CM

## 2022-11-16 PROCEDURE — 99316 NF DSCHRG MGMT 30 MIN+: CPT | Performed by: FAMILY MEDICINE

## 2022-11-16 NOTE — LETTER
11/16/2022        RE: Mayur Patel  [address]  [facility] [address]        [facility]    Facility:   [facility] ([facility]) [[id number]]   Code Status: FULL CODE      CHIEF COMPLAINT/REASON FOR VISIT:  Chief Complaint   Patient presents with     Discharge Summary Nursing Home       HISTORY:      HPI: Mayur is a 81 year old male who was hospitalized September 13, 2022 through October 12, 2022 secondary to necrotizing soft tissue infection along with a status post fusion of the sacroiliac joint along with sepsis with acute renal failure and septic shock along with acute renal failure, cellulitis of the left lower extremity.  He did have an I&D of the right thigh and muscle and fascia and subcutaneous fat on September 14 and also did have an excision debridement of the right thigh and right gluteus on September 14.  On September 15 he had an I&D right lower extremity and a right gluteal washout.  On September 19 he had an I&D right lower extremity with a wound VAC placement.  October 4 had an I&D of the right lower extremity with right anterior thigh closure, right lateral hip closure, right lateral distal thigh closure and wound VAC application.  He did have a visit with pain service who did put him on Dilaudid and made some various adjustments.  He also had a visit with infectious disease put on ceftriaxone for 6 weeks and do weekly laboratory studies.  Did meet with psychiatry they did recommend Ritalin 10 mg twice daily for motivation and energy.  They also gave him melatonin 3 mg at bedtime as well as mirtazapine 7.5 mg, Celexa 20 mg.  He did have palliative care.  According to orthopedics he is weightbearing as tolerated.  His laboratory studies on September 13 did show a hemoglobin of 11.2 and sodium 134 potassium 3.8, BUN 64 and creatinine 1.64.  His work-up on September 15 did show abdominal x-ray which did show endotracheal tube and he did have air and  nondilated loops of the large and small bowel.  Chest x-ray on September 15 did show small right and small to moderate left pleural effusions but no pneumothorax.  CT of the femur of the right on September 13 did show a poorly circumcised gas and fluid adjacent to the right gluteal musculature as well as the right hip and femur negative for fracture no evidence of osteomyelitis.  Does have additional gas and fluid within the region of the right iliopsoas adjacent to the right hip joint.  Ultrasound bilateral upper extremities was negative for DVT.  He has been in the transitional care unit and up to this point has been able to participate with the rehabilitation services.  Initially was very tough to participate due to generalized weakness and debility however now he is able to ambulate greater than 300 feet with a walker.  He is feeling much better.  During his stay in the transitional care unit he has been normotensive and afebrile and also on room air.  Current weight 127 pounds in comparison to November 7 127 pounds.  He is now on cefdinir 300 mg twice daily for 90 days secondary to his chronic infection.  For pain is on gabapentin as well as Tylenol scheduled he does usually take a Norco a dose per day as needed we talked about that as well.  His appetite has improved.  No longer requires a feeding tube.  Getting extra nutrient supplements including Protostat and boost.  Moods have been stable.  Denies any bowel or bladder issues.    Past Medical History:   Diagnosis Date     Back pain      BPH (benign prostatic hyperplasia)      Disease of thyroid gland      HTN (hypertension)      Hypertension      Sinusitis      Stroke (H)      Unspecified cerebral artery occlusion with cerebral infarction             Family History   Problem Relation Age of Onset     Substance Abuse Son      Hypertension Mother       Social History     Socioeconomic History     Marital status:    Tobacco Use     Smoking status:  Former     Types: Cigarettes     Smokeless tobacco: Never   Substance and Sexual Activity     Alcohol use: Yes     Comment: Alcoholic Drinks/day: Social     Drug use: Never   Social History Narrative    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    PHYSICAL EXAM:   Pleasant gentleman in no acute distress.  Head is normocephalic. Neck is supple without adenopathy.   Lung sounds are clear throughout.  Cardiovascular S1 is 2 regular rate and rhythm and no lower extremity edema.  Gastrointestinal soft and nontender with thin build.  Musculoskeletal -working with therapy.  Denies discomfort.  Thin build.  Psychiatric: Pleasant affect.    Current Outpatient Medications:      acetaminophen (TYLENOL) 500 MG tablet, Take 1,000 mg by mouth 3 times daily, Disp: , Rfl:      calcium carbonate (TUMS) 500 MG chewable tablet, Take 2 chew tab by mouth 3 times daily as needed for heartburn, Disp: , Rfl:      cefdinir (OMNICEF) 300 MG capsule, Take 300 mg by mouth 2 times daily 90 days, Disp: , Rfl:      ferrous sulfate (FEROSUL) 325 (65 Fe) MG tablet, Take 325 mg by mouth every other day, Disp: , Rfl:      gabapentin (NEURONTIN) 100 MG capsule, Take 200 mg by mouth 3 times daily, Disp: , Rfl:      HYDROcodone-acetaminophen (NORCO) 5-325 MG tablet, Take 2 tablets by mouth every 6 hours as needed for pain 4000 mg/24h Tylenol max, Disp: 60 tablet, Rfl: 0     methylphenidate (RITALIN) 10 MG tablet, Take 1 tablet (10 mg) by mouth 2 times daily, Disp: 90 tablet, Rfl: 0     mirtazapine (REMERON) 7.5 MG tablet, Take 7.5 mg by mouth At Bedtime, Disp: , Rfl:      multivitamin, therapeutic (THERA-VIT) TABS tablet, Take 1 tablet by mouth daily, Disp: , Rfl:       nystatin (MYCOSTATIN) 967612 UNIT/GM external powder, Apply topically 2 times daily To groin, Disp: , Rfl:      polyethylene glycol (MIRALAX) 17 g packet, Take 1 packet by mouth daily, Disp: , Rfl:      Protein (PROSOURCE PO), Take 90 mLs by mouth daily Mix with 120 ml water, Disp: , Rfl:      senna-docusate (SENOKOT-S/PERICOLACE) 8.6-50 MG tablet, Take 1 tablet by mouth daily, Disp: , Rfl:      tamsulosin (FLOMAX) 0.4 MG capsule, Take 1 capsule (0.4 mg) by mouth 2 times daily, Disp: 180 capsule, Rfl: 0     traZODone (DESYREL) 50 MG tablet, Take 100 mg by mouth At Bedtime, Disp: , Rfl:      vitamin C (ASCORBIC ACID) 250 MG tablet, Take 250 mg by mouth daily, Disp: , Rfl:      vitamin D2 (ERGOCALCIFEROL) 37544 units (1250 mcg) capsule, Take 50,000 Units by mouth once a week On Fridays, Disp: , Rfl:      zinc Oxide (DESITIN) 40 % paste, Apply topically 4 times daily as needed for dry skin or irritation Apply as needed for perianal irritation after BM, Disp: , Rfl:     Pulse 86   Temp 98.9  F (37.2  C)   Resp 20   Wt 57.6 kg (127 lb)   SpO2 98%   BMI 19.31 kg/m        LABS:   Last Comprehensive Metabolic Panel:  Sodium   Date Value Ref Range Status   08/23/2022 137 136 - 145 mmol/L Final     Potassium   Date Value Ref Range Status   10/27/2022 5.9 (H) 3.4 - 5.3 mmol/L Final   03/09/2022 4.5 3.5 - 5.0 mmol/L Final   02/28/2014 4.8 3.4 - 5.3 mmol/L Final     Chloride   Date Value Ref Range Status   08/23/2022 101 98 - 107 mmol/L Final   09/15/2021 101 98 - 107 mmol/L Final     Carbon Dioxide (CO2)   Date Value Ref Range Status   08/23/2022 26 22 - 29 mmol/L Final   09/15/2021 21 (L) 22 - 31 mmol/L Final     Anion Gap   Date Value Ref Range Status   08/23/2022 10 7 - 15 mmol/L Final   09/15/2021 14 5 - 18 mmol/L Final     Glucose   Date Value Ref Range Status   08/23/2022 83 70 - 99 mg/dL Final   09/15/2021 99 70 - 125 mg/dL Final     Urea Nitrogen   Date Value Ref Range Status   10/27/2022 27.0 (H) 8.0 - 23.0  mg/dL Final   09/15/2021 19 8 - 28 mg/dL Final     Creatinine   Date Value Ref Range Status   10/27/2022 0.71 0.67 - 1.17 mg/dL Final   02/28/2014 1.20 0.66 - 1.25 mg/dL Final     GFR Estimate   Date Value Ref Range Status   10/27/2022 >90 >60 mL/min/1.73m2 Final     Comment:     Effective December 21, 2021 eGFRcr in adults is calculated using the 2021 CKD-EPI creatinine equation which includes age and gender (Cordell et al., NEJ, DOI: 10.1056/DAMEih0975033)   03/08/2021 45 (L) >60 mL/min/1.73m2 Final   02/28/2014 60 (L) >60 mL/min/1.7m2 Final     Calcium   Date Value Ref Range Status   08/23/2022 9.1 8.8 - 10.2 mg/dL Final     Last Comprehensive Metabolic Panel:  Sodium   Date Value Ref Range Status   08/23/2022 137 136 - 145 mmol/L Final     Potassium   Date Value Ref Range Status   10/27/2022 5.9 (H) 3.4 - 5.3 mmol/L Final   03/09/2022 4.5 3.5 - 5.0 mmol/L Final   02/28/2014 4.8 3.4 - 5.3 mmol/L Final     Chloride   Date Value Ref Range Status   08/23/2022 101 98 - 107 mmol/L Final   09/15/2021 101 98 - 107 mmol/L Final     Carbon Dioxide (CO2)   Date Value Ref Range Status   08/23/2022 26 22 - 29 mmol/L Final   09/15/2021 21 (L) 22 - 31 mmol/L Final     Anion Gap   Date Value Ref Range Status   08/23/2022 10 7 - 15 mmol/L Final   09/15/2021 14 5 - 18 mmol/L Final     Glucose   Date Value Ref Range Status   08/23/2022 83 70 - 99 mg/dL Final   09/15/2021 99 70 - 125 mg/dL Final     Urea Nitrogen   Date Value Ref Range Status   10/27/2022 27.0 (H) 8.0 - 23.0 mg/dL Final   09/15/2021 19 8 - 28 mg/dL Final     Creatinine   Date Value Ref Range Status   10/27/2022 0.71 0.67 - 1.17 mg/dL Final   02/28/2014 1.20 0.66 - 1.25 mg/dL Final     GFR Estimate   Date Value Ref Range Status   10/27/2022 >90 >60 mL/min/1.73m2 Final     Comment:     Effective December 21, 2021 eGFRcr in adults is calculated using the 2021 CKD-EPI creatinine equation which includes age and gender (Cordell martinez al., NEJM, DOI: 10.1056/WLLYuu4656237)    03/08/2021 45 (L) >60 mL/min/1.73m2 Final   02/28/2014 60 (L) >60 mL/min/1.7m2 Final     Calcium   Date Value Ref Range Status   08/23/2022 9.1 8.8 - 10.2 mg/dL Final     Bilirubin Total   Date Value Ref Range Status   10/27/2022 0.2 <=1.2 mg/dL Final     Alkaline Phosphatase   Date Value Ref Range Status   10/27/2022 128 40 - 129 U/L Final     ALT   Date Value Ref Range Status   10/27/2022 152 (H) 10 - 50 U/L Final     AST   Date Value Ref Range Status   10/23/2022 62 (H) 10 - 50 U/L Final       No results found for: JLW682, TLOF877, [id number], VITD3, D2VIT, D3VIT, DTOT, [id number], [id number], [id number], [id number], [id number], [id number]  CRP Inflammation   Date Value Ref Range Status   10/27/2022 12.90 (H) <5.00 mg/L Final           CBC RESULTS: Recent Labs   Lab Test 10/27/22  0700   WBC 5.5   RBC 2.98*   HGB 8.5*   HCT 28.5*   MCV 96   MCH 28.5   MCHC 29.8*   RDW 16.8*   *         ASSESSMENT:    Encounter Diagnoses   Name Primary?     Fusion of lumbosacral spine Yes     Benign essential hypertension      Hypothyroidism, unspecified type      Necrotizing soft tissue infection        MEDICAL EQUIPMENT NEEDS:  He will require a standard wheelchair along with a seat cushion and foot rests for accessories.  The seat width of 15 inches and a seat depth of 6 inches.  He will need a manual wheelchair 16 x 16 with coccyx cut out seat cushion.  He has mobility limitations that impairs his ability to participate in MR ADLs such as toileting, feeding, dressing, grooming and bathing in customary locations in the home.  The mobility mentation cannot be sufficiently and safely resolved by the use of a cane or walker due to generalized weakness.  Plus he does have his recent right leg surgery with necrotizing soft tissue infection.  He or the caregiver will be able to safely use the manual wheelchair.  His functional mobility deficit will be sufficiently resolved by the use of a manual wheelchair.  He does have a  fair Tinetti score.  He will require that for lifetime with a height of 68 inches and a weight of 127 pounds.    DISCHARGE PLAN/FACE TO FACE:  I certify that services are/were furnished while this patient was under the care of a physician and that a physician or an allowed non-physician practitioner (NPP), had a face-to-face encounter that meets the physician face-to-face encounter requirements. The encounter was in whole, or in part, related to the primary reason for home health. The patient is confined to his/her home and needs intermittent skilled nursing, physical therapy, speech-language pathology, or the continued need for occupational therapy. A plan of care has been established by a physician and is periodically reviewed by a physician.  Date of Face-to-Face Encounter: November 16, 2022    I certify that, based on my findings, the following services are medically necessary home health services: He will be discharging to home with current medications with an approximated date of November 19, 2022 with [facility] for physical and occupational therapy home health aide and nursing    My clinical findings support the need for the above skilled services because: (Please write a brief narrative summary that describes what the RN, PT, SLP, or other services will be doing in the home. A list of diagnoses in this section does not meet the CMS requirements.)  He will require the skilled services secondary to self-care deficits and chronic medical conditions as well as his recent hospitalization and continuation of the rehabilitation process and home safety along with medication management support with nursing    This patient is homebound because: (Please write a brief narrative summary describing the functional limitations as to why this patient is homebound and specifically what makes this patient homebound.)  Secondary to chronic medical conditions along with safety and home exercises along with a  smooth transition to home due to his recent hospitalization and his stay in the transitional care unit along with self-care deficits and nursing for medication management of chronic medical conditions.    The patient is, or has been, under my care and I have initiated the establishment of the plan of care. This patient will be followed by a physician who will periodically review the plan of care.    Schedule follow up visit with primary care provider within 7 days to reestablish care.  He will follow-up with any specialist in the near future as previously arranged.  He will also follow-up with his primary care doctor regarding medication management and any future laboratory studies certainly again looking at the hemoglobin he is on ferrous sulfate every other day.  His cefdinir will continue for 90 days until January 31, 2023.  Encouraged was the continued house nutrient supplements to improve his weight and his overall malnutrition.  He does feel comfortable upon going home and does feel like he will do well once going home and having the extra services available.  He did not have any other new questions or concerns.    Discharge coordination of care greater than 30 minutes.    Electronically signed by: Jasmeet Porras NP          Sincerely,        Jasmeet Porras NP

## 2022-11-16 NOTE — PROGRESS NOTES
[facility] GERIATRIC SERVICES    Facility:   [facility] ([facility]) [[id number]]   Code Status: FULL CODE      CHIEF COMPLAINT/REASON FOR VISIT:  Chief Complaint   Patient presents with     Discharge Summary Nursing Home       HISTORY:      HPI: Mayur is a 81 year old male who was hospitalized September 13, 2022 through October 12, 2022 secondary to necrotizing soft tissue infection along with a status post fusion of the sacroiliac joint along with sepsis with acute renal failure and septic shock along with acute renal failure, cellulitis of the left lower extremity.  He did have an I&D of the right thigh and muscle and fascia and subcutaneous fat on September 14 and also did have an excision debridement of the right thigh and right gluteus on September 14.  On September 15 he had an I&D right lower extremity and a right gluteal washout.  On September 19 he had an I&D right lower extremity with a wound VAC placement.  October 4 had an I&D of the right lower extremity with right anterior thigh closure, right lateral hip closure, right lateral distal thigh closure and wound VAC application.  He did have a visit with pain service who did put him on Dilaudid and made some various adjustments.  He also had a visit with infectious disease put on ceftriaxone for 6 weeks and do weekly laboratory studies.  Did meet with psychiatry they did recommend Ritalin 10 mg twice daily for motivation and energy.  They also gave him melatonin 3 mg at bedtime as well as mirtazapine 7.5 mg, Celexa 20 mg.  He did have palliative care.  According to orthopedics he is weightbearing as tolerated.  His laboratory studies on September 13 did show a hemoglobin of 11.2 and sodium 134 potassium 3.8, BUN 64 and creatinine 1.64.  His work-up on September 15 did show abdominal x-ray which did show endotracheal tube and he did have air and nondilated loops of the large and small bowel.  Chest x-ray on September 15 did show small right and small to  moderate left pleural effusions but no pneumothorax.  CT of the femur of the right on September 13 did show a poorly circumcised gas and fluid adjacent to the right gluteal musculature as well as the right hip and femur negative for fracture no evidence of osteomyelitis.  Does have additional gas and fluid within the region of the right iliopsoas adjacent to the right hip joint.  Ultrasound bilateral upper extremities was negative for DVT.  He has been in the transitional care unit and up to this point has been able to participate with the rehabilitation services.  Initially was very tough to participate due to generalized weakness and debility however now he is able to ambulate greater than 300 feet with a walker.  He is feeling much better.  During his stay in the transitional care unit he has been normotensive and afebrile and also on room air.  Current weight 127 pounds in comparison to November 7 127 pounds.  He is now on cefdinir 300 mg twice daily for 90 days secondary to his chronic infection.  For pain is on gabapentin as well as Tylenol scheduled he does usually take a Norco a dose per day as needed we talked about that as well.  His appetite has improved.  No longer requires a feeding tube.  Getting extra nutrient supplements including Protostat and boost.  Moods have been stable.  Denies any bowel or bladder issues.    Past Medical History:   Diagnosis Date     Back pain      BPH (benign prostatic hyperplasia)      Disease of thyroid gland      HTN (hypertension)      Hypertension      Sinusitis      Stroke (H)      Unspecified cerebral artery occlusion with cerebral infarction             Family History   Problem Relation Age of Onset     Substance Abuse Son      Hypertension Mother       Social History     Socioeconomic History     Marital status:    Tobacco Use     Smoking status: Former     Types: Cigarettes     Smokeless tobacco: Never   Substance and Sexual Activity     Alcohol use: Yes      Comment: Alcoholic Drinks/day: Social     Drug use: Never   Social History Narrative    Oral maxillofacial surgeon in [place]         REVIEW OF SYSTEM:  He currently denies any new symptoms of fever cough or cold sore throat postnasal drip allergies shortness of breath dyspnea wheezing coughing chest pain dizziness vertigo nausea vomiting diarrhea dysuria frequency urgency headaches or unusual myalgias or arthralgias.    PHYSICAL EXAM:   Pleasant gentleman in no acute distress.  Head is normocephalic. Neck is supple without adenopathy.   Lung sounds are clear throughout.  Cardiovascular S1 is 2 regular rate and rhythm and no lower extremity edema.  Gastrointestinal soft and nontender with thin build.  Musculoskeletal -working with therapy.  Denies discomfort.  Thin build.  Psychiatric: Pleasant affect.    Current Outpatient Medications:      acetaminophen (TYLENOL) 500 MG tablet, Take 1,000 mg by mouth 3 times daily, Disp: , Rfl:      calcium carbonate (TUMS) 500 MG chewable tablet, Take 2 chew tab by mouth 3 times daily as needed for heartburn, Disp: , Rfl:      cefdinir (OMNICEF) 300 MG capsule, Take 300 mg by mouth 2 times daily 90 days, Disp: , Rfl:      ferrous sulfate (FEROSUL) 325 (65 Fe) MG tablet, Take 325 mg by mouth every other day, Disp: , Rfl:      gabapentin (NEURONTIN) 100 MG capsule, Take 200 mg by mouth 3 times daily, Disp: , Rfl:      HYDROcodone-acetaminophen (NORCO) 5-325 MG tablet, Take 2 tablets by mouth every 6 hours as needed for pain 4000 mg/24h Tylenol max, Disp: 60 tablet, Rfl: 0     methylphenidate (RITALIN) 10 MG tablet, Take 1 tablet (10 mg) by mouth 2 times daily, Disp: 90 tablet, Rfl: 0     mirtazapine (REMERON) 7.5 MG tablet, Take 7.5 mg by mouth At Bedtime, Disp: , Rfl:      multivitamin, therapeutic (THERA-VIT) TABS tablet, Take 1 tablet by mouth daily, Disp: , Rfl:      nystatin (MYCOSTATIN) 469814 UNIT/GM external powder, Apply topically 2 times daily To groin, Disp: , Rfl:       polyethylene glycol (MIRALAX) 17 g packet, Take 1 packet by mouth daily, Disp: , Rfl:      Protein (PROSOURCE PO), Take 90 mLs by mouth daily Mix with 120 ml water, Disp: , Rfl:      senna-docusate (SENOKOT-S/PERICOLACE) 8.6-50 MG tablet, Take 1 tablet by mouth daily, Disp: , Rfl:      tamsulosin (FLOMAX) 0.4 MG capsule, Take 1 capsule (0.4 mg) by mouth 2 times daily, Disp: 180 capsule, Rfl: 0     traZODone (DESYREL) 50 MG tablet, Take 100 mg by mouth At Bedtime, Disp: , Rfl:      vitamin C (ASCORBIC ACID) 250 MG tablet, Take 250 mg by mouth daily, Disp: , Rfl:      vitamin D2 (ERGOCALCIFEROL) 21566 units (1250 mcg) capsule, Take 50,000 Units by mouth once a week On Fridays, Disp: , Rfl:      zinc Oxide (DESITIN) 40 % paste, Apply topically 4 times daily as needed for dry skin or irritation Apply as needed for perianal irritation after BM, Disp: , Rfl:     Pulse 86   Temp 98.9  F (37.2  C)   Resp 20   Wt 57.6 kg (127 lb)   SpO2 98%   BMI 19.31 kg/m        LABS:   Last Comprehensive Metabolic Panel:  Sodium   Date Value Ref Range Status   08/23/2022 137 136 - 145 mmol/L Final     Potassium   Date Value Ref Range Status   10/27/2022 5.9 (H) 3.4 - 5.3 mmol/L Final   03/09/2022 4.5 3.5 - 5.0 mmol/L Final   02/28/2014 4.8 3.4 - 5.3 mmol/L Final     Chloride   Date Value Ref Range Status   08/23/2022 101 98 - 107 mmol/L Final   09/15/2021 101 98 - 107 mmol/L Final     Carbon Dioxide (CO2)   Date Value Ref Range Status   08/23/2022 26 22 - 29 mmol/L Final   09/15/2021 21 (L) 22 - 31 mmol/L Final     Anion Gap   Date Value Ref Range Status   08/23/2022 10 7 - 15 mmol/L Final   09/15/2021 14 5 - 18 mmol/L Final     Glucose   Date Value Ref Range Status   08/23/2022 83 70 - 99 mg/dL Final   09/15/2021 99 70 - 125 mg/dL Final     Urea Nitrogen   Date Value Ref Range Status   10/27/2022 27.0 (H) 8.0 - 23.0 mg/dL Final   09/15/2021 19 8 - 28 mg/dL Final     Creatinine   Date Value Ref Range Status   10/27/2022 0.71 0.67 -  1.17 mg/dL Final   02/28/2014 1.20 0.66 - 1.25 mg/dL Final     GFR Estimate   Date Value Ref Range Status   10/27/2022 >90 >60 mL/min/1.73m2 Final     Comment:     Effective December 21, 2021 eGFRcr in adults is calculated using the 2021 CKD-EPI creatinine equation which includes age and gender (Cordell et al., NE, DOI: 10.1056/YQVOug4159225)   03/08/2021 45 (L) >60 mL/min/1.73m2 Final   02/28/2014 60 (L) >60 mL/min/1.7m2 Final     Calcium   Date Value Ref Range Status   08/23/2022 9.1 8.8 - 10.2 mg/dL Final     Last Comprehensive Metabolic Panel:  Sodium   Date Value Ref Range Status   08/23/2022 137 136 - 145 mmol/L Final     Potassium   Date Value Ref Range Status   10/27/2022 5.9 (H) 3.4 - 5.3 mmol/L Final   03/09/2022 4.5 3.5 - 5.0 mmol/L Final   02/28/2014 4.8 3.4 - 5.3 mmol/L Final     Chloride   Date Value Ref Range Status   08/23/2022 101 98 - 107 mmol/L Final   09/15/2021 101 98 - 107 mmol/L Final     Carbon Dioxide (CO2)   Date Value Ref Range Status   08/23/2022 26 22 - 29 mmol/L Final   09/15/2021 21 (L) 22 - 31 mmol/L Final     Anion Gap   Date Value Ref Range Status   08/23/2022 10 7 - 15 mmol/L Final   09/15/2021 14 5 - 18 mmol/L Final     Glucose   Date Value Ref Range Status   08/23/2022 83 70 - 99 mg/dL Final   09/15/2021 99 70 - 125 mg/dL Final     Urea Nitrogen   Date Value Ref Range Status   10/27/2022 27.0 (H) 8.0 - 23.0 mg/dL Final   09/15/2021 19 8 - 28 mg/dL Final     Creatinine   Date Value Ref Range Status   10/27/2022 0.71 0.67 - 1.17 mg/dL Final   02/28/2014 1.20 0.66 - 1.25 mg/dL Final     GFR Estimate   Date Value Ref Range Status   10/27/2022 >90 >60 mL/min/1.73m2 Final     Comment:     Effective December 21, 2021 eGFRcr in adults is calculated using the 2021 CKD-EPI creatinine equation which includes age and gender (Cordell et al., NEJM, DOI: 10.1056/CWEOft3349949)   03/08/2021 45 (L) >60 mL/min/1.73m2 Final   02/28/2014 60 (L) >60 mL/min/1.7m2 Final     Calcium   Date Value Ref Range  Status   08/23/2022 9.1 8.8 - 10.2 mg/dL Final     Bilirubin Total   Date Value Ref Range Status   10/27/2022 0.2 <=1.2 mg/dL Final     Alkaline Phosphatase   Date Value Ref Range Status   10/27/2022 128 40 - 129 U/L Final     ALT   Date Value Ref Range Status   10/27/2022 152 (H) 10 - 50 U/L Final     AST   Date Value Ref Range Status   10/23/2022 62 (H) 10 - 50 U/L Final       No results found for: ZQS854, TAOM772, TJDB55LQBMY, VITD3, D2VIT, D3VIT, DTOT, [id number], [id number], [id number], [id number], [id number], [id number]  CRP Inflammation   Date Value Ref Range Status   10/27/2022 12.90 (H) <5.00 mg/L Final           CBC RESULTS: Recent Labs   Lab Test 10/27/22  0700   WBC 5.5   RBC 2.98*   HGB 8.5*   HCT 28.5*   MCV 96   MCH 28.5   MCHC 29.8*   RDW 16.8*   *         ASSESSMENT:    Encounter Diagnoses   Name Primary?     Fusion of lumbosacral spine Yes     Benign essential hypertension      Hypothyroidism, unspecified type      Necrotizing soft tissue infection        MEDICAL EQUIPMENT NEEDS:  He will require a standard wheelchair along with a seat cushion and foot rests for accessories.  The seat width of 15 inches and a seat depth of 6 inches.  He will need a manual wheelchair 16 x 16 with coccyx cut out seat cushion.  He has mobility limitations that impairs his ability to participate in  ADLs such as toileting, feeding, dressing, grooming and bathing in customary locations in the home.  The mobility mentation cannot be sufficiently and safely resolved by the use of a cane or walker due to generalized weakness.  Plus he does have his recent right leg surgery with necrotizing soft tissue infection.  He or the caregiver will be able to safely use the manual wheelchair.  His functional mobility deficit will be sufficiently resolved by the use of a manual wheelchair.  He does have a fair Tinetti score.  He will require that for lifetime with a height of 68 inches and a weight of 127 pounds.    DISCHARGE  PLAN/FACE TO FACE:  I certify that services are/were furnished while this patient was under the care of a physician and that a physician or an allowed non-physician practitioner (NPP), had a face-to-face encounter that meets the physician face-to-face encounter requirements. The encounter was in whole, or in part, related to the primary reason for home health. The patient is confined to his/her home and needs intermittent skilled nursing, physical therapy, speech-language pathology, or the continued need for occupational therapy. A plan of care has been established by a physician and is periodically reviewed by a physician.  Date of Face-to-Face Encounter: November 16, 2022    I certify that, based on my findings, the following services are medically necessary home health services: He will be discharging to home with current medications with an approximated date of November 19, 2022 with [facility] for physical and occupational therapy home health aide and nursing    My clinical findings support the need for the above skilled services because: (Please write a brief narrative summary that describes what the RN, PT, SLP, or other services will be doing in the home. A list of diagnoses in this section does not meet the CMS requirements.)  He will require the skilled services secondary to self-care deficits and chronic medical conditions as well as his recent hospitalization and continuation of the rehabilitation process and home safety along with medication management support with nursing    This patient is homebound because: (Please write a brief narrative summary describing the functional limitations as to why this patient is homebound and specifically what makes this patient homebound.)  Secondary to chronic medical conditions along with safety and home exercises along with a smooth transition to home due to his recent hospitalization and his stay in the transitional care unit along with self-care  deficits and nursing for medication management of chronic medical conditions.    The patient is, or has been, under my care and I have initiated the establishment of the plan of care. This patient will be followed by a physician who will periodically review the plan of care.    Schedule follow up visit with primary care provider within 7 days to reestablish care.  He will follow-up with any specialist in the near future as previously arranged.  He will also follow-up with his primary care doctor regarding medication management and any future laboratory studies certainly again looking at the hemoglobin he is on ferrous sulfate every other day.  His cefdinir will continue for 90 days until January 31, 2023.  Encouraged was the continued house nutrient supplements to improve his weight and his overall malnutrition.  He does feel comfortable upon going home and does feel like he will do well once going home and having the extra services available.  He did not have any other new questions or concerns.    Discharge coordination of care greater than 30 minutes.    Electronically signed by: Jasmeet Porras NP

## 2022-11-19 DIAGNOSIS — M79.89 NECROTIZING SOFT TISSUE INFECTION: ICD-10-CM

## 2022-11-19 RX ORDER — HYDROCODONE BITARTRATE AND ACETAMINOPHEN 5; 325 MG/1; MG/1
2 TABLET ORAL EVERY 6 HOURS PRN
Qty: 15 TABLET | Refills: 0 | Status: SHIPPED | OUTPATIENT
Start: 2022-11-19

## 2022-11-22 ENCOUNTER — PATIENT OUTREACH (OUTPATIENT)
Dept: CARE COORDINATION | Facility: CLINIC | Age: 82
End: 2022-11-22

## 2022-11-22 NOTE — LETTER
[facility] CARE COORDINATION  [facility]    November 28, 2022    Mayur Patel  [address]   [place] [address]      Dear Mayur,        I am a clinic care coordinator who works with Zac Rushing MD with the [facility]. I wanted to thank you for spending the time to talk with me.  Below is a description of clinic care coordination and how I can further assist you.       The clinic care coordination team is made up of a registered nurse, , financial resource worker and community health worker who understand the health care system. The goal of clinic care coordination is to help you manage your health and improve access to the health care system. Our team works alongside your provider to assist you in determining your health and social needs. We can help you obtain health care and community resources, providing you with necessary information and education. We can work with you through any barriers and develop a care plan that helps coordinate and strengthen the communication between you and your care team.    Please feel free to contact me with any questions or concerns regarding care coordination and what we can offer.      We are focused on providing you with the highest-quality healthcare experience possible.    Sincerely,     Nataliya Sarmiento,   [facility]  [phone]

## 2022-11-22 NOTE — PROGRESS NOTES
Contact   Chart Review     Situation: Patient chart reviewed by .    Background: Following for TCU discharge.     Assessment: Call back from patient who is doing well.  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  He delayed the start of home care PT to this week due the holiday.  He is using his wheelchair and a walker at this time.      Plan/Recommendations: Will send CC intro letter and patient will call if needs arise.    Contact  [facility]/Voicemail    Referral Source: Care Team  Clinical Data:  Outreach  Outreach attempted x 1.  Left message on voicemail with call back information and requested return call. Preferred number is disconnected.    Plan:   will try to reach patient again in 3-5 business days.  .Nataliya Sarmiento,   [facility]  [phone]

## 2022-11-23 ENCOUNTER — TELEPHONE (OUTPATIENT)
Dept: FAMILY MEDICINE | Facility: CLINIC | Age: 82
End: 2022-11-23

## 2022-11-23 NOTE — TELEPHONE ENCOUNTER
RN will route this encounter to Zac Rushing MD, to review and advise.      Sarah Freedman RN  [facility]

## 2022-11-23 NOTE — TELEPHONE ENCOUNTER
Lianna from [facility]    looking for the ok to delay start of care till 11/28 as patient refused start of care this week     [facility]   Lianna  [phone]  Message: Y

## 2022-11-28 ENCOUNTER — TELEPHONE (OUTPATIENT)
Dept: FAMILY MEDICINE | Facility: CLINIC | Age: 82
End: 2022-11-28

## 2022-12-01 ENCOUNTER — TELEPHONE (OUTPATIENT)
Dept: FAMILY MEDICINE | Facility: CLINIC | Age: 82
End: 2022-12-01

## 2022-12-01 NOTE — TELEPHONE ENCOUNTER
Cecille calling to request verbal orders for the following:    PT 2x per week for 5 weeks.  Then 1x per week for 3 weeks.  Transfers, strength and gait training.    Please call Cecille at [phone].  Secure VM if needed.

## 2022-12-05 NOTE — TELEPHONE ENCOUNTER
Relayed verbal order for Physical therapy. Cecille notes that pt has OT ordered, but that he is declining at this time. She said that she will see patient today at noon, and will inquire about patient transferring care going forward to [facility]. Tammy Peacock RN on 12/5/2022 at 8:34 AM

## 2022-12-27 ENCOUNTER — TELEPHONE (OUTPATIENT)
Dept: FAMILY MEDICINE | Facility: CLINIC | Age: 82
End: 2022-12-27

## 2022-12-27 NOTE — TELEPHONE ENCOUNTER
Today there was a telephone conference with Jenny Bethea, Clinic Manager at [facility] and myself with the care facility where Mayur Blas is apparently living.  I was faxed some orders with regards to his care today but as we will know I have contacted the patient 5 weeks ago and discussed the fact that he needs to get other primary care follow-up based on the complexity of his case.  I recommended that he follow-up with [facility] geriatric services who provided his evaluation and postoperative care during his medical crises that have occurred during the year 2022.  As the patient knows very well I have not seen him in almost a year and had really no involvement with his most recent illnesses.  Clinic manager at his living facility will document our conversation, to make sure everyone is on the same page.  Once again patient needs geriatric services specialized in high risk severe illnesses and this was reiterated to the patient and the family and the care facility which was a skilled situation in a different facility than the one that he is in now.

## 2023-02-02 ENCOUNTER — TRANSFERRED RECORDS (OUTPATIENT)
Dept: HEALTH INFORMATION MANAGEMENT | Facility: CLINIC | Age: 83
End: 2023-02-02

## 2023-03-09 ENCOUNTER — TRANSFERRED RECORDS (OUTPATIENT)
Dept: HEALTH INFORMATION MANAGEMENT | Facility: CLINIC | Age: 83
End: 2023-03-09

## 2023-05-04 ENCOUNTER — TELEPHONE (OUTPATIENT)
Dept: FAMILY MEDICINE | Facility: CLINIC | Age: 83
End: 2023-05-04

## 2023-05-04 NOTE — TELEPHONE ENCOUNTER
Mayur presented at clinic today to see Dr. Rushing, patient was aware that his appointment was cancelled and confirmed he received a phone call. I spoke to Mayur in a private room reiterating due to the complexity of his care, he would need to establish with another care giver. Mayur was aware of our previous conversation from earlier this year and said he has established with another provider in [place]. Patient came into clinic to see Dr. Rushing to talk as old friends and missed him. I explained to Mayur, Dr. Rushing is here on limited basis. At this time Mayur stated he didn't have any immediate need and I asked if I could help schedule him an appointment at [place], patient declined.    Unknown if ever smoked

## 2023-05-24 ENCOUNTER — TRANSFERRED RECORDS (OUTPATIENT)
Dept: HEALTH INFORMATION MANAGEMENT | Facility: CLINIC | Age: 83
End: 2023-05-24
Payer: MEDICARE

## 2023-06-22 ENCOUNTER — TRANSFERRED RECORDS (OUTPATIENT)
Dept: HEALTH INFORMATION MANAGEMENT | Facility: CLINIC | Age: 83
End: 2023-06-22
Payer: MEDICARE

## 2023-06-28 RX ORDER — CEFAZOLIN SODIUM/WATER 2 G/20 ML
2 SYRINGE (ML) INTRAVENOUS SEE ADMIN INSTRUCTIONS
Status: CANCELLED | OUTPATIENT
Start: 2023-08-01

## 2023-06-28 RX ORDER — CEFAZOLIN SODIUM/WATER 2 G/20 ML
2 SYRINGE (ML) INTRAVENOUS
Status: CANCELLED | OUTPATIENT
Start: 2023-08-01

## 2023-07-24 ENCOUNTER — TRANSFERRED RECORDS (OUTPATIENT)
Dept: HEALTH INFORMATION MANAGEMENT | Facility: CLINIC | Age: 83
End: 2023-07-24
Payer: MEDICARE

## 2023-07-31 RX ORDER — ERGOCALCIFEROL 1.25 MG/1
50000 CAPSULE, LIQUID FILLED ORAL WEEKLY
COMMUNITY

## 2023-08-01 ENCOUNTER — ANESTHESIA (OUTPATIENT)
Dept: SURGERY | Facility: HOSPITAL | Age: 83
End: 2023-08-01
Payer: MEDICARE

## 2023-08-01 ENCOUNTER — HOSPITAL ENCOUNTER (OUTPATIENT)
Facility: HOSPITAL | Age: 83
Discharge: HOME | End: 2023-08-01
Attending: UROLOGY | Admitting: UROLOGY
Payer: MEDICARE

## 2023-08-01 ENCOUNTER — ANESTHESIA EVENT (OUTPATIENT)
Dept: SURGERY | Facility: HOSPITAL | Age: 83
End: 2023-08-01
Payer: MEDICARE

## 2023-08-01 VITALS
TEMPERATURE: 97.9 F | OXYGEN SATURATION: 96 % | HEIGHT: 68 IN | WEIGHT: 162.2 LBS | RESPIRATION RATE: 20 BRPM | BODY MASS INDEX: 24.58 KG/M2 | SYSTOLIC BLOOD PRESSURE: 144 MMHG | DIASTOLIC BLOOD PRESSURE: 68 MMHG | HEART RATE: 67 BPM

## 2023-08-01 DIAGNOSIS — R39.198 SLOWING OF URINARY STREAM: Primary | ICD-10-CM

## 2023-08-01 PROCEDURE — 250N000009 HC RX 250: Performed by: UROLOGY

## 2023-08-01 PROCEDURE — 710N000009 HC RECOVERY PHASE 1, LEVEL 1, PER MIN: Performed by: UROLOGY

## 2023-08-01 PROCEDURE — 250N000009 HC RX 250: Performed by: NURSE ANESTHETIST, CERTIFIED REGISTERED

## 2023-08-01 PROCEDURE — 360N000075 HC SURGERY LEVEL 2, PER MIN: Performed by: UROLOGY

## 2023-08-01 PROCEDURE — 250N000025 HC SEVOFLURANE, PER MIN: Performed by: UROLOGY

## 2023-08-01 PROCEDURE — 250N000011 HC RX IP 250 OP 636: Performed by: NURSE ANESTHETIST, CERTIFIED REGISTERED

## 2023-08-01 PROCEDURE — 258N000003 HC RX IP 258 OP 636: Performed by: NURSE ANESTHETIST, CERTIFIED REGISTERED

## 2023-08-01 PROCEDURE — L8699 PROSTHETIC IMPLANT NOS: HCPCS | Performed by: UROLOGY

## 2023-08-01 PROCEDURE — 250N000011 HC RX IP 250 OP 636: Mod: JZ | Performed by: UROLOGY

## 2023-08-01 PROCEDURE — 999N000141 HC STATISTIC PRE-PROCEDURE NURSING ASSESSMENT: Performed by: UROLOGY

## 2023-08-01 PROCEDURE — 272N000001 HC OR GENERAL SUPPLY STERILE: Performed by: UROLOGY

## 2023-08-01 PROCEDURE — 370N000017 HC ANESTHESIA TECHNICAL FEE, PER MIN: Performed by: UROLOGY

## 2023-08-01 PROCEDURE — 258N000003 HC RX IP 258 OP 636: Performed by: ANESTHESIOLOGY

## 2023-08-01 PROCEDURE — 710N000012 HC RECOVERY PHASE 2, PER MINUTE: Performed by: UROLOGY

## 2023-08-01 DEVICE — UROLIFT 2 IMPLANT CARTRIDGE
Type: IMPLANTABLE DEVICE | Site: PROSTATE | Status: FUNCTIONAL
Brand: UROLIFT

## 2023-08-01 RX ORDER — OXYCODONE HYDROCHLORIDE 5 MG/1
5 TABLET ORAL
Status: DISCONTINUED | OUTPATIENT
Start: 2023-08-01 | End: 2023-08-01 | Stop reason: HOSPADM

## 2023-08-01 RX ORDER — ONDANSETRON 4 MG/1
4 TABLET, ORALLY DISINTEGRATING ORAL EVERY 30 MIN PRN
Status: DISCONTINUED | OUTPATIENT
Start: 2023-08-01 | End: 2023-08-01 | Stop reason: HOSPADM

## 2023-08-01 RX ORDER — DEXAMETHASONE SODIUM PHOSPHATE 10 MG/ML
INJECTION, SOLUTION INTRAMUSCULAR; INTRAVENOUS PRN
Status: DISCONTINUED | OUTPATIENT
Start: 2023-08-01 | End: 2023-08-01

## 2023-08-01 RX ORDER — FENTANYL CITRATE 50 UG/ML
INJECTION, SOLUTION INTRAMUSCULAR; INTRAVENOUS PRN
Status: DISCONTINUED | OUTPATIENT
Start: 2023-08-01 | End: 2023-08-01

## 2023-08-01 RX ORDER — PROPOFOL 10 MG/ML
INJECTION, EMULSION INTRAVENOUS PRN
Status: DISCONTINUED | OUTPATIENT
Start: 2023-08-01 | End: 2023-08-01

## 2023-08-01 RX ORDER — LIDOCAINE HYDROCHLORIDE 10 MG/ML
INJECTION, SOLUTION INFILTRATION; PERINEURAL PRN
Status: DISCONTINUED | OUTPATIENT
Start: 2023-08-01 | End: 2023-08-01

## 2023-08-01 RX ORDER — FENTANYL CITRATE 50 UG/ML
50 INJECTION, SOLUTION INTRAMUSCULAR; INTRAVENOUS EVERY 5 MIN PRN
Status: DISCONTINUED | OUTPATIENT
Start: 2023-08-01 | End: 2023-08-01 | Stop reason: HOSPADM

## 2023-08-01 RX ORDER — OXYCODONE HYDROCHLORIDE 5 MG/1
5 TABLET ORAL ONCE
Status: DISCONTINUED | OUTPATIENT
Start: 2023-08-01 | End: 2023-08-01 | Stop reason: HOSPADM

## 2023-08-01 RX ORDER — EPHEDRINE SULFATE 50 MG/ML
INJECTION, SOLUTION INTRAMUSCULAR; INTRAVENOUS; SUBCUTANEOUS PRN
Status: DISCONTINUED | OUTPATIENT
Start: 2023-08-01 | End: 2023-08-01

## 2023-08-01 RX ORDER — ONDANSETRON 2 MG/ML
4 INJECTION INTRAMUSCULAR; INTRAVENOUS EVERY 30 MIN PRN
Status: DISCONTINUED | OUTPATIENT
Start: 2023-08-01 | End: 2023-08-01 | Stop reason: HOSPADM

## 2023-08-01 RX ORDER — LEVOFLOXACIN 5 MG/ML
500 INJECTION, SOLUTION INTRAVENOUS ONCE
Status: COMPLETED | OUTPATIENT
Start: 2023-08-01 | End: 2023-08-01

## 2023-08-01 RX ORDER — SULFAMETHOXAZOLE/TRIMETHOPRIM 800-160 MG
1 TABLET ORAL 2 TIMES DAILY
COMMUNITY

## 2023-08-01 RX ORDER — OXYCODONE HYDROCHLORIDE 5 MG/1
10 TABLET ORAL
Status: DISCONTINUED | OUTPATIENT
Start: 2023-08-01 | End: 2023-08-01 | Stop reason: HOSPADM

## 2023-08-01 RX ORDER — OXYCODONE HYDROCHLORIDE 5 MG/1
5 TABLET ORAL EVERY 6 HOURS PRN
Qty: 6 TABLET | Refills: 0 | Status: SHIPPED | OUTPATIENT
Start: 2023-08-01

## 2023-08-01 RX ORDER — ACETAMINOPHEN 325 MG/1
650 TABLET ORAL EVERY 6 HOURS PRN
Status: DISCONTINUED | OUTPATIENT
Start: 2023-08-01 | End: 2023-08-01 | Stop reason: HOSPADM

## 2023-08-01 RX ORDER — SODIUM CHLORIDE, SODIUM LACTATE, POTASSIUM CHLORIDE, CALCIUM CHLORIDE 600; 310; 30; 20 MG/100ML; MG/100ML; MG/100ML; MG/100ML
INJECTION, SOLUTION INTRAVENOUS CONTINUOUS
Status: DISCONTINUED | OUTPATIENT
Start: 2023-08-01 | End: 2023-08-01 | Stop reason: HOSPADM

## 2023-08-01 RX ORDER — ONDANSETRON 2 MG/ML
INJECTION INTRAMUSCULAR; INTRAVENOUS PRN
Status: DISCONTINUED | OUTPATIENT
Start: 2023-08-01 | End: 2023-08-01

## 2023-08-01 RX ORDER — FENTANYL CITRATE 50 UG/ML
25 INJECTION, SOLUTION INTRAMUSCULAR; INTRAVENOUS EVERY 5 MIN PRN
Status: DISCONTINUED | OUTPATIENT
Start: 2023-08-01 | End: 2023-08-01 | Stop reason: HOSPADM

## 2023-08-01 RX ORDER — LIDOCAINE 40 MG/G
CREAM TOPICAL
Status: DISCONTINUED | OUTPATIENT
Start: 2023-08-01 | End: 2023-08-01 | Stop reason: HOSPADM

## 2023-08-01 RX ORDER — GLYCOPYRROLATE 0.2 MG/ML
INJECTION, SOLUTION INTRAMUSCULAR; INTRAVENOUS PRN
Status: DISCONTINUED | OUTPATIENT
Start: 2023-08-01 | End: 2023-08-01

## 2023-08-01 RX ADMIN — PROPOFOL 200 MG: 10 INJECTION, EMULSION INTRAVENOUS at 09:58

## 2023-08-01 RX ADMIN — Medication 10 MG: at 10:04

## 2023-08-01 RX ADMIN — GLYCOPYRROLATE 0.1 MG: 0.2 INJECTION INTRAMUSCULAR; INTRAVENOUS at 10:21

## 2023-08-01 RX ADMIN — PHENYLEPHRINE HYDROCHLORIDE 100 MCG: 10 INJECTION INTRAVENOUS at 10:22

## 2023-08-01 RX ADMIN — SODIUM CHLORIDE, POTASSIUM CHLORIDE, SODIUM LACTATE AND CALCIUM CHLORIDE: 600; 310; 30; 20 INJECTION, SOLUTION INTRAVENOUS at 09:26

## 2023-08-01 RX ADMIN — Medication 10 MG: at 10:15

## 2023-08-01 RX ADMIN — ONDANSETRON 4 MG: 2 INJECTION INTRAMUSCULAR; INTRAVENOUS at 10:09

## 2023-08-01 RX ADMIN — LEVOFLOXACIN 500 MG: 500 INJECTION, SOLUTION INTRAVENOUS at 09:29

## 2023-08-01 RX ADMIN — FENTANYL CITRATE 100 MCG: 50 INJECTION, SOLUTION INTRAMUSCULAR; INTRAVENOUS at 09:58

## 2023-08-01 RX ADMIN — DEXAMETHASONE SODIUM PHOSPHATE 10 MG: 10 INJECTION, SOLUTION INTRAMUSCULAR; INTRAVENOUS at 10:09

## 2023-08-01 RX ADMIN — Medication 5 MG: at 10:07

## 2023-08-01 RX ADMIN — LIDOCAINE HYDROCHLORIDE 5 ML: 10 INJECTION, SOLUTION INFILTRATION; PERINEURAL at 09:58

## 2023-08-01 ASSESSMENT — ACTIVITIES OF DAILY LIVING (ADL)
ADLS_ACUITY_SCORE: 35
ADLS_ACUITY_SCORE: 35

## 2023-08-01 NOTE — OP NOTE
Location: [facility]     Patient Name: Mayur Patel  Patient : 1940  Patient MRN: [id number]  Patient CSN: [id number]  Patient PCP: No Ref-Primary, Physician    Date of Service: 23     Pre-operative diagnosis: BPH and weak stream    Post-operative diagnosis: BPH and weak stream    Procedure:  Urolift    Surgeon: Dr. Frandy Hathaway    EBL: 5 mL    Anesthesia: General    Indication: 82 year olda male with BPH and weak stream. He had an Urolift procedure on 21 with good results. However, symptoms recurred.  Cystoscopy on 23 showed some obstruction of the distal prostate.  After discussion, he wanted to repeat Urolift.  Risks, benefits, and alternatives to treatment were discussed with the patient and the patient elected to proceed.    Findings: His urethra was normal. His prostate had bilobar obstruction of the distal prostate.  His bladder did not have any tumors or stones.  A total of 5 implants were placed, 2 on the left and 3 on the right.    Specimens: None    Procedure:  After written informed consent was obtained the patient was brought into the operating room.  The patient was properly identified prior to the procedure, received preprocedure antibiotics, and had sequential compression devices on the legs.  The patient was then induced under anesthesia.     He was positioned in dorsal lithotomy position and prepped and draped in the usual sterile fashion.     An 18 Fr cystoscope sheath was inserted into the meatus and advanced into the bladder.  I proceeded with placement of the first proximal implant. With the sheath in the bladder, the telescope bridge was replaced with the implant delivery device and the telescope lens reintroduced into the device. The first treatment site was determined by orienting the delivery device tip in an anterolateral direction in the bladder and slowly moving the device distally within the prostatic fossa to the verumontanum.  The distal tip of the  delivery device was then angled laterally at least 20 degrees at this position so as to compress the lateral lobe and assure proper needle trajectory. The needle was deployed and then retracted, allowing one end of the implant to be delivered to the capsular surface of the prostate. The implant was then tensioned to assure capsular seating and removal of slack monofilament. The device was then angled back toward midline and slowly advanced proximally (typically 3 to 4 mm) until cystoscopic verification of the monofilament being centered in the delivery bay. The urethral end piece was then affixed to the monofilament.  The device was then readvanced into the bladder. The above sequence was repeated at the contralateral location in the 9 to 10 o'clock rotation.  If there was persistent obstruction distally, the above sequence was repeated with further implants delivered and inspected until a satisfactory result was obtained, namely a reshaping of prostate that offers a continuous channel through the anterior aspect of the prostatic fossa. A final cystoscopy was conducted first to inspect the location and state of each implant to assure proper seating and location as well as to assure that the prostatic channel remains patent without irrigation pressure.  Total number of implants used = 5 (3 on right, 2 on left).     An 18 Fr rdz catheter was placed with 10 mL of water in the balloon.     At this point, the procedure was completed. He tolerated the procedure well.     Plan: Home. Catheter out on 8/2/23. Follow up on 9/6/23.    Frandy Hathaway MD  10:36 AM

## 2023-08-01 NOTE — ANESTHESIA CARE TRANSFER NOTE
Patient: Mayur Patel    Procedure: Procedure(s):  CYSTOSCOPY, UROLIFT       Diagnosis: Benign prostatic hyperplasia with lower urinary tract symptoms [N40.1]  Diagnosis Additional Information: No value filed.    Anesthesia Type:   General     Note:    Oropharynx: oropharynx clear of all foreign objects  Level of Consciousness: awake  Oxygen Supplementation: room air    Independent Airway: airway patency satisfactory and stable  Dentition: dentition unchanged  Vital Signs Stable: post-procedure vital signs reviewed and stable  Report to RN Given: handoff report given  Patient transferred to: PACU    Handoff Report: Identifed the Patient, Identified the Reponsible Provider, Reviewed the pertinent medical history, Discussed the surgical course, Reviewed Intra-OP anesthesia mangement and issues during anesthesia, Set expectations for post-procedure period and Allowed opportunity for questions and acknowledgement of understanding      Vitals:  Vitals Value Taken Time   /72 08/01/23 1055   Temp 36.6  C (97.8  F) 08/01/23 1055   Pulse 71 08/01/23 1055   Resp 17 08/01/23 1055   SpO2 96 % 08/01/23 1055       Electronically Signed By: IFRAH Larios CRNA  August 1, 2023  10:55 AM

## 2023-08-01 NOTE — ANESTHESIA PREPROCEDURE EVALUATION
Anesthesia Pre-Procedure Evaluation    Patient: Mayur Patel   MRN: [id number] : 1940        Procedure : Procedure(s):  CYSTOSCOPY, UROLIFT          Past Medical History:   Diagnosis Date    RASHAD (acute kidney injury) (H)     Back pain     BPH (benign prostatic hyperplasia)     Chronic low back pain     DDD (degenerative disc disease), lumbar     Disease of thyroid gland     Foraminal stenosis of lumbar region     L5    GERD (gastroesophageal reflux disease)     HTN (hypertension)     Hypertension     Impaired functional mobility, balance, gait, and endurance 2023    Necrotizing soft tissue infection 2022    Sinusitis     Stroke (H)     Unspecified cerebral artery occlusion with cerebral infarction       Past Surgical History:   Procedure Laterality Date    BIOPSY PROSTATE NEEDLE / PUNCH / INCISIONAL N/A 2021    Procedure: TRANSRECTAL ULTRASOUND GUIDED PROSTATE BIOPSY;  Surgeon: Frandy Hathaway MD;  Location: [facility];  Service: Urology    COLONOSCOPY      CYSTOURETHROSCOPY Bilateral 2021    Procedure: CYSTOURETHROSCOPY, WITH INSERTION OF PERMANENT ADJUSTABLE TRANSPROSTATIC IMPLANT, SINGLE IMPLANT;  Surgeon: Frandy Hathaway MD;  Location: [facility];  Service: Urology    IR CAROTID ANGIOGRAM  2009    IR CAROTID ANGIOGRAM  2009    IR MISCELLANEOUS PROCEDURE  2009    IR MISCELLANEOUS PROCEDURE  2009    OPTICAL TRACKING SYSTEM ENDOSCOPIC ENDONASAL SURGERY  2014    Procedure: OPTICAL TRACKING SYSTEM ENDOSCOPIC ENDONASAL SURGERY;  Stealth Guided Bilateral Inferior Turbinate Reduction, Maxillary Antrostomy And Polypectomy, Maxillary Cyst removal ;  Surgeon: Mandy Lauren MD;  Location: [facility] OR    RHYTIDECTOMY (FACELIFT)      S/P fusion of sacroiliac joint  2022      Allergies   Allergen Reactions    Penicillins Hives, Itching and Rash      Social History     Tobacco Use    Smoking status: Former     Types: Cigarettes     Smokeless tobacco: Never   Substance Use Topics    Alcohol use: Not Currently     Comment: Alcoholic Drinks/day: Social      Wt Readings from Last 1 Encounters:   08/01/23 73.6 kg (162 lb 3.2 oz)        Anesthesia Evaluation            ROS/MED HX  ENT/Pulmonary:  - neg pulmonary ROS     Neurologic:  - neg neurologic ROS   (+)          CVA,    TIA,                  Cardiovascular:  - neg cardiovascular ROS   (+)  hypertension- -   -  - -                                      METS/Exercise Tolerance: >4 METS    Hematologic:  - neg hematologic  ROS     Musculoskeletal:  - neg musculoskeletal ROS     GI/Hepatic:  - neg GI/hepatic ROS   (+) GERD,                   Renal/Genitourinary:  - neg Renal ROS   (+) renal disease,             Endo:  - neg endo ROS   (+)          thyroid problem,            Psychiatric/Substance Use:  - neg psychiatric ROS     Infectious Disease:  - neg infectious disease ROS     Malignancy:  - neg malignancy ROS     Other:  - neg other ROS          Physical Exam    Airway        Mallampati: II    Neck ROM: full     Respiratory Devices and Support         Dental           Cardiovascular   cardiovascular exam normal          Pulmonary   pulmonary exam normal                OUTSIDE LABS:  CBC:   Lab Results   Component Value Date    WBC 5.5 10/27/2022    WBC 5.9 10/20/2022    HGB 8.5 (L) 10/27/2022    HGB 8.4 (L) 10/20/2022    HCT 28.5 (L) 10/27/2022    HCT 26.8 (L) 10/20/2022     (H) 10/27/2022     10/20/2022     BMP:   Lab Results   Component Value Date     08/23/2022     09/15/2021    POTASSIUM 5.9 (H) 10/27/2022    POTASSIUM 4.7 10/25/2022    CHLORIDE 101 08/23/2022    CHLORIDE 101 09/15/2021    CO2 26 08/23/2022    CO2 21 (L) 09/15/2021    BUN 27.0 (H) 10/27/2022    BUN 32.3 (H) 10/20/2022    CR 0.71 10/27/2022    CR 0.71 10/20/2022    GLC 83 08/23/2022    GLC 99 09/15/2021     COAGS: No results found for: PTT, INR, FIBR  POC: No results found for: BGM, HCG,  HCGS  HEPATIC:   Lab Results   Component Value Date    ALBUMIN 4.4 09/15/2021    PROTTOTAL 7.1 09/15/2021     (H) 10/27/2022    AST 62 (H) 10/23/2022    ALKPHOS 128 10/27/2022    BILITOTAL 0.2 10/27/2022     OTHER:   Lab Results   Component Value Date    A1C 5.4 06/12/2009    MIL 9.1 08/23/2022    MAG 2.0 08/05/2019    TSH 1.29 06/22/2020       Anesthesia Plan    ASA Status:  3       Anesthesia Type: General.     - Airway: LMA              Consents    Anesthesia Plan(s) and associated risks, benefits, and realistic alternatives discussed. Questions answered and patient/representative(s) expressed understanding.     - Discussed:     - Discussed with:  Patient            Postoperative Care       PONV prophylaxis: Ondansetron (or other 5HT-3), Dexamethasone or Solumedrol     Comments:                Kevin Le MD

## 2023-08-01 NOTE — ANESTHESIA PROCEDURE NOTES
Airway       Patient location during procedure: OR  Staff -        Anesthesiologist:  Kevin Le MD       CRNA: Yari Mcgraw APRN CRNA       Performed By: CRNA  Consent for Airway        Urgency: elective  Indications and Patient Condition       Indications for airway management: jaden-procedural       Induction type:intravenous       Mask difficulty assessment: 0 - not attempted    Final Airway Details       Final airway type: supraglottic airway    Supraglottic Airway Details        Type: LMA       Brand: Ambu AuraGain       LMA size: 5    Post intubation assessment        Placement verified by: capnometry, equal breath sounds and chest rise        Number of attempts at approach: 1       Number of other approaches attempted: 0       Ease of procedure: easy       Dentition: Unchanged

## 2023-08-01 NOTE — ANESTHESIA POSTPROCEDURE EVALUATION
Patient: Mayur Patel    Procedure: Procedure(s):  CYSTOSCOPY, UROLIFT       Anesthesia Type:  General    Note:  Disposition: Outpatient   Postop Pain Control: Uneventful            Sign Out: Well controlled pain   PONV: No   Neuro/Psych: Uneventful            Sign Out: Acceptable/Baseline neuro status   Airway/Respiratory: Uneventful            Sign Out: Acceptable/Baseline resp. status   CV/Hemodynamics: Uneventful            Sign Out: Acceptable CV status; No obvious hypovolemia; No obvious fluid overload   Other NRE:    DID A NON-ROUTINE EVENT OCCUR?            Last vitals:  Vitals Value Taken Time   /72 08/01/23 1053   Temp     Pulse 72 08/01/23 1052   Resp     SpO2     Vitals shown include unvalidated device data.    Electronically Signed By: Kevin Le MD  August 1, 2023  10:54 AM

## 2023-10-11 ENCOUNTER — TRANSFERRED RECORDS (OUTPATIENT)
Dept: HEALTH INFORMATION MANAGEMENT | Facility: CLINIC | Age: 83
End: 2023-10-11
Payer: MEDICARE

## 2023-10-23 ENCOUNTER — TRANSFERRED RECORDS (OUTPATIENT)
Dept: HEALTH INFORMATION MANAGEMENT | Facility: CLINIC | Age: 83
End: 2023-10-23
Payer: MEDICARE

## 2023-11-02 NOTE — ANESTHESIA CARE TRANSFER NOTE
Last vitals:   Vitals:    04/26/21 0746   BP: 165/77   Pulse: 64   Resp: 16   Temp: 36.9  C (98.5  F)   SpO2: 97%     Patient's level of consciousness is drowsy  Spontaneous respirations: yes  Maintains airway independently: yes  Dentition unchanged: yes  Oropharynx: oropharynx clear of all foreign objects    QCDR Measures:  ASA# 20 - Surgical Safety Checklist: WHO surgical safety checklist completed prior to induction    PQRS# 430 - Adult PONV Prevention: 4558F - Pt received => 2 anti-emetic agents (different classes) preop & intraop  ASA# 8 - Peds PONV Prevention: NA - Not pediatric patient, not GA or 2 or more risk factors NOT present  PQRS# 424 - [place]-op Temp Management: 4559F - At least one body temp DOCUMENTED => 35.5C or 95.9F within required timeframe  PQRS# 426 - PACU Transfer Protocol: - Transfer of care checklist used  ASA# 14 - Acute Post-op Pain: ASA14B - Patient did NOT experience pain >= 7 out of 10   Detail Level: Detailed Render Note In Bullet Format When Appropriate: No Number Of Freeze-Thaw Cycles: 3 freeze-thaw cycles Show Applicator Variable?: Yes Consent: The patient's consent was obtained including but not limited to risks of crusting, scabbing, blistering, scarring, darker or lighter pigmentary change, recurrence, incomplete removal and infection. Post-Care Instructions: I reviewed with the patient in detail post-care instructions. Patient is to wear sunprotection, and avoid picking at any of the treated lesions. Pt may apply Vaseline to crusted or scabbing areas. Duration Of Freeze Thaw-Cycle (Seconds): 0

## 2023-11-04 ENCOUNTER — HEALTH MAINTENANCE LETTER (OUTPATIENT)
Age: 83
End: 2023-11-04

## 2023-12-15 ENCOUNTER — TRANSFERRED RECORDS (OUTPATIENT)
Dept: HEALTH INFORMATION MANAGEMENT | Facility: CLINIC | Age: 83
End: 2023-12-15
Payer: MEDICARE

## 2024-01-15 ENCOUNTER — TRANSFERRED RECORDS (OUTPATIENT)
Dept: HEALTH INFORMATION MANAGEMENT | Facility: CLINIC | Age: 84
End: 2024-01-15
Payer: MEDICARE

## 2024-02-14 ENCOUNTER — TRANSFERRED RECORDS (OUTPATIENT)
Dept: HEALTH INFORMATION MANAGEMENT | Facility: CLINIC | Age: 84
End: 2024-02-14
Payer: MEDICARE

## 2024-03-18 ENCOUNTER — TRANSFERRED RECORDS (OUTPATIENT)
Dept: HEALTH INFORMATION MANAGEMENT | Facility: CLINIC | Age: 84
End: 2024-03-18
Payer: MEDICARE

## 2024-04-15 ENCOUNTER — TRANSFERRED RECORDS (OUTPATIENT)
Dept: HEALTH INFORMATION MANAGEMENT | Facility: CLINIC | Age: 84
End: 2024-04-15
Payer: MEDICARE

## 2024-05-15 ENCOUNTER — TRANSFERRED RECORDS (OUTPATIENT)
Dept: HEALTH INFORMATION MANAGEMENT | Facility: CLINIC | Age: 84
End: 2024-05-15
Payer: MEDICARE

## 2024-08-10 ENCOUNTER — HOSPITAL ENCOUNTER (EMERGENCY)
Facility: CLINIC | Age: 84
Discharge: HOME | End: 2024-08-10
Attending: EMERGENCY MEDICINE | Admitting: EMERGENCY MEDICINE
Payer: MEDICARE

## 2024-08-10 ENCOUNTER — APPOINTMENT (OUTPATIENT)
Dept: CT IMAGING | Facility: CLINIC | Age: 84
End: 2024-08-10
Attending: EMERGENCY MEDICINE
Payer: MEDICARE

## 2024-08-10 VITALS
OXYGEN SATURATION: 97 % | SYSTOLIC BLOOD PRESSURE: 149 MMHG | HEART RATE: 79 BPM | DIASTOLIC BLOOD PRESSURE: 75 MMHG | TEMPERATURE: 97.7 F | RESPIRATION RATE: 18 BRPM

## 2024-08-10 DIAGNOSIS — G89.29 CHRONIC BILATERAL LOW BACK PAIN WITHOUT SCIATICA: Primary | ICD-10-CM

## 2024-08-10 DIAGNOSIS — M54.50 CHRONIC BILATERAL LOW BACK PAIN WITHOUT SCIATICA: Primary | ICD-10-CM

## 2024-08-10 DIAGNOSIS — R10.9 BILATERAL FLANK PAIN: ICD-10-CM

## 2024-08-10 LAB
ALBUMIN SERPL BCG-MCNC: 4 G/DL (ref 3.5–5.2)
ALBUMIN UR-MCNC: NEGATIVE MG/DL
ALP SERPL-CCNC: 110 U/L (ref 40–150)
ALT SERPL W P-5'-P-CCNC: 20 U/L (ref 0–70)
ANION GAP SERPL CALCULATED.3IONS-SCNC: 10 MMOL/L (ref 7–15)
APPEARANCE UR: CLEAR
AST SERPL W P-5'-P-CCNC: 20 U/L (ref 0–45)
BASOPHILS # BLD AUTO: 0 10E3/UL (ref 0–0.2)
BASOPHILS NFR BLD AUTO: 0 %
BILIRUB SERPL-MCNC: 0.4 MG/DL
BILIRUB UR QL STRIP: NEGATIVE
BUN SERPL-MCNC: 18.7 MG/DL (ref 8–23)
CALCIUM SERPL-MCNC: 9.9 MG/DL (ref 8.8–10.4)
CHLORIDE SERPL-SCNC: 97 MMOL/L (ref 98–107)
COLOR UR AUTO: YELLOW
CREAT SERPL-MCNC: 1.16 MG/DL (ref 0.67–1.17)
EGFRCR SERPLBLD CKD-EPI 2021: 63 ML/MIN/1.73M2
EOSINOPHIL # BLD AUTO: 0.2 10E3/UL (ref 0–0.7)
EOSINOPHIL NFR BLD AUTO: 3 %
ERYTHROCYTE [DISTWIDTH] IN BLOOD BY AUTOMATED COUNT: 15.3 % (ref 10–15)
GLUCOSE SERPL-MCNC: 118 MG/DL (ref 70–99)
GLUCOSE UR STRIP-MCNC: NEGATIVE MG/DL
HCO3 SERPL-SCNC: 28 MMOL/L (ref 22–29)
HCT VFR BLD AUTO: 35.3 % (ref 40–53)
HGB BLD-MCNC: 11.4 G/DL (ref 13.3–17.7)
HGB UR QL STRIP: NEGATIVE
HYALINE CASTS: 4 /LPF
IMM GRANULOCYTES # BLD: 0 10E3/UL
IMM GRANULOCYTES NFR BLD: 0 %
KETONES UR STRIP-MCNC: NEGATIVE MG/DL
LEUKOCYTE ESTERASE UR QL STRIP: NEGATIVE
LYMPHOCYTES # BLD AUTO: 1.1 10E3/UL (ref 0.8–5.3)
LYMPHOCYTES NFR BLD AUTO: 18 %
MCH RBC QN AUTO: 31.1 PG (ref 26.5–33)
MCHC RBC AUTO-ENTMCNC: 32.3 G/DL (ref 31.5–36.5)
MCV RBC AUTO: 96 FL (ref 78–100)
MONOCYTES # BLD AUTO: 0.5 10E3/UL (ref 0–1.3)
MONOCYTES NFR BLD AUTO: 8 %
MUCOUS THREADS #/AREA URNS LPF: PRESENT /LPF
NEUTROPHILS # BLD AUTO: 4.4 10E3/UL (ref 1.6–8.3)
NEUTROPHILS NFR BLD AUTO: 71 %
NITRATE UR QL: NEGATIVE
NRBC # BLD AUTO: 0 10E3/UL
NRBC BLD AUTO-RTO: 0 /100
PH UR STRIP: 6 [PH] (ref 5–7)
PLATELET # BLD AUTO: 309 10E3/UL (ref 150–450)
POTASSIUM SERPL-SCNC: 4.5 MMOL/L (ref 3.4–5.3)
PROT SERPL-MCNC: 7 G/DL (ref 6.4–8.3)
RBC # BLD AUTO: 3.66 10E6/UL (ref 4.4–5.9)
RBC URINE: <1 /HPF
SODIUM SERPL-SCNC: 135 MMOL/L (ref 135–145)
SP GR UR STRIP: 1.03 (ref 1–1.03)
UROBILINOGEN UR STRIP-MCNC: NORMAL MG/DL
WBC # BLD AUTO: 6.2 10E3/UL (ref 4–11)
WBC URINE: 3 /HPF

## 2024-08-10 PROCEDURE — 250N000009 HC RX 250: Performed by: EMERGENCY MEDICINE

## 2024-08-10 PROCEDURE — 258N000003 HC RX IP 258 OP 636: Performed by: EMERGENCY MEDICINE

## 2024-08-10 PROCEDURE — 81001 URINALYSIS AUTO W/SCOPE: CPT | Performed by: EMERGENCY MEDICINE

## 2024-08-10 PROCEDURE — 72131 CT LUMBAR SPINE W/O DYE: CPT | Mod: MA

## 2024-08-10 PROCEDURE — 36415 COLL VENOUS BLD VENIPUNCTURE: CPT | Performed by: EMERGENCY MEDICINE

## 2024-08-10 PROCEDURE — 99285 EMERGENCY DEPT VISIT HI MDM: CPT | Mod: 25

## 2024-08-10 PROCEDURE — 250N000011 HC RX IP 250 OP 636: Performed by: EMERGENCY MEDICINE

## 2024-08-10 PROCEDURE — 96361 HYDRATE IV INFUSION ADD-ON: CPT

## 2024-08-10 PROCEDURE — 80053 COMPREHEN METABOLIC PANEL: CPT | Performed by: EMERGENCY MEDICINE

## 2024-08-10 PROCEDURE — 85004 AUTOMATED DIFF WBC COUNT: CPT | Performed by: EMERGENCY MEDICINE

## 2024-08-10 PROCEDURE — 96374 THER/PROPH/DIAG INJ IV PUSH: CPT | Mod: 59

## 2024-08-10 PROCEDURE — 74177 CT ABD & PELVIS W/CONTRAST: CPT | Mod: MA

## 2024-08-10 RX ORDER — IOPAMIDOL 755 MG/ML
81 INJECTION, SOLUTION INTRAVASCULAR ONCE
Status: COMPLETED | OUTPATIENT
Start: 2024-08-10 | End: 2024-08-10

## 2024-08-10 RX ORDER — HYDROMORPHONE HYDROCHLORIDE 1 MG/ML
0.5 INJECTION, SOLUTION INTRAMUSCULAR; INTRAVENOUS; SUBCUTANEOUS
Status: DISCONTINUED | OUTPATIENT
Start: 2024-08-10 | End: 2024-08-10 | Stop reason: HOSPADM

## 2024-08-10 RX ORDER — KETOROLAC TROMETHAMINE 15 MG/ML
15 INJECTION, SOLUTION INTRAMUSCULAR; INTRAVENOUS ONCE
Status: COMPLETED | OUTPATIENT
Start: 2024-08-10 | End: 2024-08-10

## 2024-08-10 RX ADMIN — IOPAMIDOL 81 ML: 755 INJECTION, SOLUTION INTRAVENOUS at 19:36

## 2024-08-10 RX ADMIN — SODIUM CHLORIDE 63 ML: 9 INJECTION, SOLUTION INTRAVENOUS at 19:36

## 2024-08-10 RX ADMIN — SODIUM CHLORIDE 1000 ML: 9 INJECTION, SOLUTION INTRAVENOUS at 18:57

## 2024-08-10 RX ADMIN — KETOROLAC TROMETHAMINE 15 MG: 15 INJECTION, SOLUTION INTRAMUSCULAR; INTRAVENOUS at 18:58

## 2024-08-10 ASSESSMENT — ACTIVITIES OF DAILY LIVING (ADL)
ADLS_ACUITY_SCORE: 37

## 2024-08-10 NOTE — ED TRIAGE NOTES
Pt c/o back and hip pain for last month, more severe today, pt also endorses cloudy urine for last week, no pain with urination, ABCD intact.       Triage Assessment (Adult)       Row Name 08/10/24 1826          Triage Assessment    Airway WDL WDL        Respiratory WDL    Respiratory WDL WDL        Skin Circulation/Temperature WDL    Skin Circulation/Temperature WDL WDL        Cardiac WDL    Cardiac WDL WDL        Peripheral/Neurovascular WDL    Peripheral Neurovascular WDL WDL        Cognitive/Neuro/Behavioral WDL    Cognitive/Neuro/Behavioral WDL WDL

## 2024-08-10 NOTE — ED PROVIDER NOTES
Emergency Department Note      History of Present Illness     Chief Complaint   Back Injury      HPI   Mayur Patel is a 83 year old male with a history of degenerative disc disease and chronic low back pain who presents for evaluation of a back injury. Patient reports that he has been experiencing bilateral back pain and flank pain in both sides for the last month that has worsened severely over the past 2 days. He states that he sees a pain doctor regularly and has been alternating taking vicodin, oxycodone, and gabapentin, most recently taking some earlier today. Patient also reports he has been experiencing some dark cloudy urine but with no painful urination. He denies any abdominal, chest, or testicular pain. Denies shortness of breath.       Independent Historian   None    Review of External Notes   I reviewed the [facility] discharge summary from 7/11 showing history of stroke and necrotizing fascitis with chronic pain when he was admitted with a left intertrochanteric femur fracture.    Past Medical History     Medical History and Problem List   Acute kidney injury  Benign prostatic hyperplasia  Chronic low back pain  Degenerative disc disease  Disease of thyroid gland  Foraminal stenosis of lumbar region  GERD  Hypertension  Sinusitis  Stroke  Impaired functional mobility, balance, gait, and endurance  Necrotizing soft tissue infection  Unspecified cerebral artery occlusion with cerebral infarction       Medications   Ferosul  Gabapentin  Oxycodone  Norco  Senokot  Trazodone  Ultram  Bactrim  Flomax  Zanaflex       Surgical History   Transrectal ultrasound guided prostate biopsy  Colonoscopy  Cystoscopy, insertion, retraction implant, prostate, for prostatic urethral lift  Cystourethroscopy  Carotid angiogram  PICC placement  Optical tracking system endoscopic endonasal surgery  Rhytidectomy  Fusion of sacroiliac joint      Physical Exam     Patient Vitals for the past 24 hrs:   BP Temp  Temp src Pulse Resp SpO2   08/10/24 2000 (!) 149/75 -- -- 79 18 97 %   08/10/24 1930 124/65 -- -- 83 18 96 %   08/10/24 1900 (!) 144/75 -- -- 77 18 98 %   08/10/24 1828 (!) 141/79 97.7  F (36.5  C) Oral 83 18 98 %     Physical Exam  General: Alert, appears elderly, frail. Cooperative.     In mild distress  HEENT:  Head:  Atraumatic  Ears:  External ears are normal  Mouth/Throat:  Oropharynx is without erythema or exudate and mucous membranes are moist.   Eyes:   Conjunctivae normal and EOM are normal. No scleral icterus.  CV:  Normal rate, regular rhythm, normal heart sounds and radial pulses are 2+ and symmetric.  No murmur.  Resp:  Breath sounds are clear bilaterally    Non-labored, no retractions or accessory muscle use  GI:  Abdomen is soft, no distension, no tenderness. No rebound or guarding.  No CVA tenderness bilaterally  MS:  Normal range of motion. No edema.    Normal strength in all 4 extremities.     Back atraumatic.    No midline cervical or thoracic tenderness.  There is tenderness to midline lumbar spine with mild swelling to middle L spine region.  No erythema to skin.  No cellulitis.  Skin:  Warm and dry.  No rash or lesions noted.  Neuro:   Alert. Antalgic gait.  Sensation intact in all 4 extremities. GCS: 15  Psych: Normal mood and affect.    Diagnostics     Lab Results   Labs Ordered and Resulted from Time of ED Arrival to Time of ED Departure   ROUTINE UA WITH MICROSCOPIC REFLEX TO CULTURE - Abnormal       Result Value    Color Urine Yellow      Appearance Urine Clear      Glucose Urine Negative      Bilirubin Urine Negative      Ketones Urine Negative      Specific Gravity Urine 1.026      Blood Urine Negative      pH Urine 6.0      Protein Albumin Urine Negative      Urobilinogen Urine Normal      Nitrite Urine Negative      Leukocyte Esterase Urine Negative      Mucus Urine Present (*)     RBC Urine <1      WBC Urine 3      Hyaline Casts Urine 4 (*)    COMPREHENSIVE METABOLIC PANEL - Abnormal     Sodium 135      Potassium 4.5      Carbon Dioxide (CO2) 28      Anion Gap 10      Urea Nitrogen 18.7      Creatinine 1.16      GFR Estimate 63      Calcium 9.9      Chloride 97 (*)     Glucose 118 (*)     Alkaline Phosphatase 110      AST 20      ALT 20      Protein Total 7.0      Albumin 4.0      Bilirubin Total 0.4     CBC WITH PLATELETS AND DIFFERENTIAL - Abnormal    WBC Count 6.2      RBC Count 3.66 (*)     Hemoglobin 11.4 (*)     Hematocrit 35.3 (*)     MCV 96      MCH 31.1      MCHC 32.3      RDW 15.3 (*)     Platelet Count 309      % Neutrophils 71      % Lymphocytes 18      % Monocytes 8      % Eosinophils 3      % Basophils 0      % Immature Granulocytes 0      NRBCs per 100 WBC 0      Absolute Neutrophils 4.4      Absolute Lymphocytes 1.1      Absolute Monocytes 0.5      Absolute Eosinophils 0.2      Absolute Basophils 0.0      Absolute Immature Granulocytes 0.0      Absolute NRBCs 0.0         Imaging   CT Abdomen Pelvis w Contrast   Final Result   IMPRESSION:    1.  No renal calculi or hydronephrosis. No evidence for acute pyelonephritis.   2.  Diffuse bladder wall thickening with perivesical stranding may represent acute cystitis. Bladder wall hypertrophy from an enlarged prostate gland also possible.   3.  Diverticula throughout the colon, without evidence for acute diverticulitis or bowel obstruction.   4.  Comminuted proximal left femoral shaft fracture traversed by an intramedullary asia. Fluid within the left hip joint space.   5.  Advanced degenerative disc disease and sclerotic changes throughout the lumbar spine. Please refer to CT lumbar spine report from today for further discussion.      CT Lumbar Spine w/o Contrast   Final Result   IMPRESSION:   1.  No finding for acute fracture or posttraumatic subluxation.      2.  Advanced lumbar spondylosis with severe degenerative interspace narrowing seen throughout the visualized thoracolumbar spine (with sparing of L1-L2).      3.  Disc osteophyte  complexes at L2-L3 and L3-L4 contributes to mild spinal canal narrowing.      4.  Bilateral foraminal stenosis L3-L4 and L5-S1.        Independent Interpretation   None    ED Course      Medications Administered   Medications   sodium chloride 0.9% BOLUS 1,000 mL (0 mLs Intravenous Stopped 8/10/24 2129)   ketorolac (TORADOL) injection 15 mg (15 mg Intravenous $Given 8/10/24 1858)   Saline (63 mLs As instructed $Given 8/10/24 1936)   iopamidol (ISOVUE-370) solution 81 mL (81 mLs Intravenous $Given 8/10/24 1936)       Procedures   Procedures     Discussion of Management   None    ED Course   ED Course as of 08/11/24 0219   Sat Aug 10, 2024   1837 I obtained patient history and performed a physical exam.        Additional Documentation  None    Medical Decision Making / Diagnosis   CMS Diagnoses: None    MIPS       None    [facility]   Mayur Patel is a 83 year old male with a history of chronic back pain who presents with bilateral flank pain and concerns for cloudy urine.  Initial workup pursued given concerns for potential infected stone versus pyelonephritis versus other sinister intra-abdominal processes.  I did note that the patient had necrotizing fasciitis of the SI joint on the right side in the right thigh approximately 3 years ago.  Patient did have a recent left hip surgery due to fracture within the last several weeks.  Patient's laboratory work was grossly unremarkable with no electrolyte abnormality, renal dysfunction, and normal LFTs.  CBC shows faint anemia although this is chronic for the patient.  Urinalysis shows no sign of infection.  CT scan of the abdomen pelvis reveals no sinister intra-abdominal findings.  No signs of kidney stone nor pyelonephritis.  There was diffuse bladder wall thickening that could represent acute cystitis but I think this is more likely related to the enlarged prostate gland as urinalysis with no sign of infection.  Patient did have diverticula but no signs of  diverticulitis.  Otherwise degenerative disc disease seen throughout the lumbar spine.  Thankfully no sign of compression fracture nor posttraumatic subluxation.  Patient does have advanced degenerative changes within the lumbar spine and is followed by a pain specialist in the outpatient setting.  He does have an appointment on Monday for follow-up with his pain specialist.  No change to pain regiment at home at this time.  Patient is ambulatory and I think safe for discharge home at this time.  After return precautions understood and all questions answered, discharged home.    Disposition   The patient was discharged.     Diagnosis     ICD-10-CM    1. Chronic bilateral low back pain without sciatica  M54.50     G89.29       2. Bilateral flank pain  R10.9          Discharge Medications   Discharge Medication List as of 8/10/2024  9:29 PM        Scribe Disclosure:  I, Shantal Ponce, am serving as a scribe at 7:59 PM on 8/10/2024 to document services personally performed by Lucio Reis MD based on my observations and the provider's statements to me.        Lucio Reis MD  08/11/24 0223

## 2024-12-22 ENCOUNTER — HEALTH MAINTENANCE LETTER (OUTPATIENT)
Age: 84
End: 2024-12-22

## (undated) DEVICE — GOWN IMPERVIOUS BREATHABLE SMART XLG 89045

## (undated) DEVICE — CUSTOM PACK CYSTO PREFERRED SOT5BCYHEA

## (undated) DEVICE — SOL WATER IRRIG 3000ML BAG 2B7117

## (undated) DEVICE — GLOVE BIOGEL PI INDICATOR 8.0 LF 41680

## (undated) DEVICE — MAT FLOOR SURGICAL 40X38 0702140238

## (undated) DEVICE — STRAP CATH LEG ADJUSTABLE 0814-8200

## (undated) DEVICE — TUBING SUCTION MEDI-VAC 1/4"X20' N620A - HE

## (undated) DEVICE — GLOVE BIOGEL PI ULTRATOUCH G SZ 7.5 42175

## (undated) DEVICE — SOL WATER IRRIG 1000ML BOTTLE 2F7114

## (undated) DEVICE — TUBING SET THERMEDX UROLOGY SGL USE LL0006

## (undated) DEVICE — PREP DYNA-HEX 4% CHG SCRUB 4OZ BOTTLE MDS098710

## (undated) DEVICE — JUMPSUIT CYSTO DISP SD-100

## (undated) DEVICE — Device

## (undated) RX ORDER — FENTANYL CITRATE 50 UG/ML
INJECTION, SOLUTION INTRAMUSCULAR; INTRAVENOUS
Status: DISPENSED
Start: 2023-08-01

## (undated) RX ORDER — EPHEDRINE SULFATE 50 MG/ML
INJECTION, SOLUTION INTRAMUSCULAR; INTRAVENOUS; SUBCUTANEOUS
Status: DISPENSED
Start: 2023-08-01

## (undated) RX ORDER — FENTANYL CITRATE-0.9 % NACL/PF 10 MCG/ML
PLASTIC BAG, INJECTION (ML) INTRAVENOUS
Status: DISPENSED
Start: 2023-08-01